# Patient Record
Sex: FEMALE | Race: WHITE | NOT HISPANIC OR LATINO | Employment: OTHER | URBAN - METROPOLITAN AREA
[De-identification: names, ages, dates, MRNs, and addresses within clinical notes are randomized per-mention and may not be internally consistent; named-entity substitution may affect disease eponyms.]

---

## 2017-04-25 ENCOUNTER — OFFICE VISIT (OUTPATIENT)
Dept: OPHTHALMOLOGY | Facility: CLINIC | Age: 82
End: 2017-04-25
Payer: MEDICARE

## 2017-04-25 ENCOUNTER — CLINICAL SUPPORT (OUTPATIENT)
Dept: OPHTHALMOLOGY | Facility: CLINIC | Age: 82
End: 2017-04-25
Attending: OPHTHALMOLOGY
Payer: MEDICARE

## 2017-04-25 DIAGNOSIS — H52.13 MYOPIA WITH ASTIGMATISM AND PRESBYOPIA, BILATERAL: ICD-10-CM

## 2017-04-25 DIAGNOSIS — H52.203 MYOPIA WITH ASTIGMATISM AND PRESBYOPIA, BILATERAL: ICD-10-CM

## 2017-04-25 DIAGNOSIS — M35.01 KCS (KERATOCONJUNCTIVITIS SICCA): ICD-10-CM

## 2017-04-25 DIAGNOSIS — H40.1133 PRIMARY OPEN-ANGLE GLAUCOMA, BILATERAL, SEVERE STAGE: ICD-10-CM

## 2017-04-25 DIAGNOSIS — H52.4 MYOPIA WITH ASTIGMATISM AND PRESBYOPIA, BILATERAL: ICD-10-CM

## 2017-04-25 DIAGNOSIS — Z96.1 PSEUDOPHAKIA: ICD-10-CM

## 2017-04-25 DIAGNOSIS — Z86.69 HISTORY OF RETINAL DETACHMENT: ICD-10-CM

## 2017-04-25 DIAGNOSIS — H02.403 PTOSIS, BILATERAL: ICD-10-CM

## 2017-04-25 DIAGNOSIS — H40.043 STEROID RESPONDERS BORDERLINE GLAUCOMA, BILATERAL: ICD-10-CM

## 2017-04-25 DIAGNOSIS — H40.1133 PRIMARY OPEN-ANGLE GLAUCOMA, BILATERAL, SEVERE STAGE: Primary | ICD-10-CM

## 2017-04-25 DIAGNOSIS — H04.123 DRY EYES, BILATERAL: ICD-10-CM

## 2017-04-25 PROCEDURE — 92134 CPTRZ OPH DX IMG PST SGM RTA: CPT | Mod: S$GLB,,, | Performed by: OPHTHALMOLOGY

## 2017-04-25 PROCEDURE — 99999 PR PBB SHADOW E&M-EST. PATIENT-LVL III: CPT | Mod: PBBFAC,,, | Performed by: OPHTHALMOLOGY

## 2017-04-25 PROCEDURE — 92012 INTRM OPH EXAM EST PATIENT: CPT | Mod: S$GLB,,, | Performed by: OPHTHALMOLOGY

## 2017-04-25 RX ORDER — CYCLOSPORINE 0.5 MG/ML
1 EMULSION OPHTHALMIC 2 TIMES DAILY
Qty: 180 EACH | Refills: 3 | Status: ON HOLD | OUTPATIENT
Start: 2017-04-25 | End: 2023-03-23

## 2017-04-25 RX ORDER — ALPRAZOLAM 0.25 MG/1
1 TABLET ORAL NIGHTLY
COMMUNITY
Start: 2017-03-28 | End: 2021-02-25 | Stop reason: SDUPTHER

## 2017-04-25 RX ORDER — TRAMADOL HYDROCHLORIDE 50 MG/1
50 TABLET ORAL EVERY 6 HOURS PRN
COMMUNITY
End: 2021-02-08

## 2017-04-25 NOTE — PROGRESS NOTES
HPI     Glaucoma    Additional comments: HRT review today           Eye Problem    Additional comments: Pt feels left eye's glaucoma is getting worse           Comments   DLS: 11/8/16    1) POAG  2) Hx RD OD  3) Ptosis/Dermatochalsis  4) Blepharitis  5) GRAYSON  6) PCIOL OU  7) Steroid Responder    MEDS:  Dorzolamide TID OU  Latanoprost QHS OU  Restasis BID OU (RAN OUT)  AT's PRN OU  EES daly QHS OU             Last edited by Nevin Davenport MA on 4/25/2017  1:44 PM. (History)            Assessment /Plan     For exam results, see Encounter Report.    Primary open-angle glaucoma, bilateral, severe stage    Steroid responders borderline glaucoma, bilateral    Ptosis, bilateral    Dry eyes, bilateral    History of retinal detachment - Right Eye    Myopia with astigmatism and presbyopia, bilateral    KCS (keratoconjunctivitis sicca)    Pseudophakia - Both Eyes    1. POAG (primary open-angle glaucoma) - Both Eyes   Followed since at least '93   First HVF 1993  First photos 1999  S/p Trab OU(Tru)  Hx of SLT OS w/ no reponse          Family history    None         Glaucoma meds   Azopt bid, Xalatan qhs         H/O adverse rxn to glaucoma drops    Agan (derm); BB(syncope)        LASERS    SLT OS - 3/25/2005        GLAUCOMA SURGERIES    Trab OU- Dr. Ott - combined od 3/24/1994 // ? Os trab        OTHER EYE SURGERIES    CE OU - Dr. Ott in the 1990's // RD sx. OD 2/19/2012 - [facility]         CDR    0.8/0.9        Tbase    -off gtts 2007- 13-18/20-27  (? Base pre trabs w/ Tru)        Tmax    19/28           Ttarget    18 OU             HVF    13 VF '93 -'16 dense IAD with sup paracentral defect OS: central island remaining, ++ progression OS        Gonio    +3 OU        CCT    [phone]        OCT    2008, 2013 - RNFL - Dec I. Yonathan S OD ??  Dec S/I OS         HRT    6 test 2009 to 2016-MR -  Dec I/N, yonathan SN od // Dec S/N/I os /// CDR 0.81 od // 0.84 os        Disc photos    7/99, 2/02, 10,04 - slides // 2011, 2015  - OIS    -  Ttoday    18/20  - Test done today   HRT/Gonio     2. History of retinal detachment - Right Eye -stable  PPVx - A/Fl Ex - arend - 2/19/2002     3. Ptosis / dermatochalasis  - Both Eyes -stable  S/P ptosis  7/2003 - Dr. Ott   Still C/O droopy left lid      4. Eyelid inflammation - Both Eyes -stable  Blepharitis with marginal ulceration 10/2013  Resolved with blephamide  Cont  - WC/LH - ocusoft lid scrubs /AT's / EES ointment      5. Dry eyes - Both Eyes -stable.   -Cont ATs.   -Tried Restasis in past and doesn't like it     6. Pseudophakia - Both Eyes -stable  Phaco/IOL/trab od 2/19/2002 - Tru  PC IOL os - Tru - ?? Date     7. Steroid responder       Plan:  IOP still  higher than ideal today 18/20  Intol to Agan and BB  Hx of no reponse to SLT OS    - cont latanoprost -ou - [employer] mail order    switch to generic  Dorzolamide 2/2 cost tid ou - sent to [facility]    Rec. baerveldt implant os (previous failed trab)   Pt will think about it - feels she is too old for more surgery   Told pt that surgery will cause the eyelid to droop further - she has had a ptosis repair in past - but feels it is drooping again already - explained that with glaucoma surger it will likely droop even more     Re-instruct on lid hygiene WC / Ocusoft lid scrubs or baby shampoo / AT's - use 4 - 8 x day // EES ointment  Retrial of restasis bid ou    rtc 3-4  months - with HPI     Glaucoma    Additional comments: HRT review today           Eye Problem    Additional comments: Pt feels left eye's glaucoma is getting worse           Comments   DLS: 11/8/16    1) POAG  2) Hx RD OD  3) Ptosis/Dermatochalsis  4) Blepharitis  5) GRAYSON  6) PCIOL OU  7) Steroid Responder    MEDS:  Dorzolamide TID OU  Latanoprost QHS OU  Restasis BID OU (RAN OUT)  AT's PRN OU  EES daly QHS OU             Last edited by Nevin Davenport MA on 4/25/2017  1:44 PM. (History)            Assessment /Plan     For exam results, see Encounter Report.    Primary open-angle glaucoma,  bilateral, severe stage    Steroid responders borderline glaucoma, bilateral    Ptosis, bilateral    Dry eyes, bilateral    History of retinal detachment - Right Eye    Myopia with astigmatism and presbyopia, bilateral    KCS (keratoconjunctivitis sicca)    Pseudophakia - Both Eyes      1. POAG (primary open-angle glaucoma) - Both Eyes   Followed since at least '93   First HVF 1993  First photos 1999  S/p Trab OU(Tru)  Hx of SLT OS w/ no reponse          Family history    None         Glaucoma meds   Azopt bid, Xalatan qhs         H/O adverse rxn to glaucoma drops    Agan (derm); BB(syncope)        LASERS    SLT OS - 3/25/2005        GLAUCOMA SURGERIES    Trab OU- Dr. Ott - combined od 3/24/1994 // ? Os trab        OTHER EYE SURGERIES    CE OU - Dr. Ott in the 1990's // RD sx. OD 2/19/2012 - [facility]         CDR    0.8/0.9        Tbase    -off gtts 2007- 13-18/20-27  (? Base pre trabs w/ Tru)        Tmax    19/28           Ttarget    18 OU             HVF    13 VF '93 -'16 dense IAD with sup paracentral defect OS: central island remaining, ++ progression OS        Gonio    +3 OU        CCT    [phone]        OCT    2008, 2013 - RNFL - Dec IBrijesh Mcmanus S OD ??  Dec S/I OS         HRT    6 test 2009 to 2017-MR -  Dec I/N, bord SN od // Dec S/N/I os /// CDR 0.81 od // 0.84 os        Disc photos    7/99, 2/02, 10,04 - slides // 2011, 2015  - OIS    - Ttoday    18/22  - Test done today   HRT/gonio     2. History of retinal detachment - Right Eye -stable  PPVx - A/Fl Ex - arend - 2/19/2002     3. Ptosis / dermatochalasis  - Both Eyes -stable  S/P ptosis  7/2003 - Dr. Ott   Still C/O droopy left lid      4. Eyelid inflammation - Both Eyes -stable  Blepharitis with marginal ulceration 10/2013  Resolved with blephamide  Cont  - WC/LH - ocusoft lid scrubs /AT's / EES ointment      5. Dry eyes - Both Eyes -stable.   -Cont ATs.   -Tried Restasis in past and doesn't like it     6. Pseudophakia - Both Eyes  -stable  Phaco/IOL/trab od 2/19/2002 - Tru  PC IOL os - Tru - ?? Date     7. Steroid responder       Plan:  IOP higher than ideal today 16/20  Intol to Agan and BB  Hx of no reponse to SLT OS    - cont latanoprost -ou - [employer] mail order   Still using azopt - still has some - when her supply runs out switch to generic  Dorzolamide 2/2 cost    Rec. baerveldt implant os (previous failed trab) or G6 - pt is reluctant   Told pt that surgery will cause the eyelid to droop further - she has had a ptosis repair in past - but feels it is drooping again already - explained that with glaucoma surgery it will likely droop even more     Consider G6 cyclo laser - OS - pt is thinking about this - will discuss with her niece     Re-instruct on lid hygiene WC / Ocusoft lid scrubs or baby shampoo / AT's - use 4 - 8 x day // EES ointment  Retrial of restasis bid ou    This patient  was best friends with another patient of mine - Ms Nayana Aguilar - who was very spry and active - she passed unexpectedly a few days after thanksgiving - found dead on her sofa - when her son went to drop of the grandkids    I have seen and personally examined the patient.  I agree with the findings, assessment and plan of the resident and/or fellow.     Ivette Edward MD

## 2017-04-25 NOTE — Clinical Note
Cyclo G 6 CB destruction OS  Phone -  [phone] If she decides NOT to have the G6 - please have devin or walter make her a F/U in 3 months at [facility]

## 2017-04-26 DIAGNOSIS — H40.1133 PRIMARY OPEN ANGLE GLAUCOMA OF BOTH EYES, SEVERE STAGE: ICD-10-CM

## 2017-04-26 RX ORDER — LATANOPROST 50 UG/ML
SOLUTION/ DROPS OPHTHALMIC
Qty: 3 BOTTLE | Refills: 3 | Status: SHIPPED | OUTPATIENT
Start: 2017-04-26 | End: 2023-04-19 | Stop reason: SDUPTHER

## 2017-05-01 ENCOUNTER — TELEPHONE (OUTPATIENT)
Dept: OPHTHALMOLOGY | Facility: CLINIC | Age: 82
End: 2017-05-01

## 2017-05-02 ENCOUNTER — TELEPHONE (OUTPATIENT)
Dept: OPHTHALMOLOGY | Facility: CLINIC | Age: 82
End: 2017-05-02

## 2017-05-02 NOTE — TELEPHONE ENCOUNTER
Called pt to schedule her next appointment in July with Dr. Edward left a message on her answering machine.

## 2017-05-04 ENCOUNTER — TELEPHONE (OUTPATIENT)
Dept: OPHTHALMOLOGY | Facility: CLINIC | Age: 82
End: 2017-05-04

## 2017-07-25 ENCOUNTER — OFFICE VISIT (OUTPATIENT)
Dept: OPHTHALMOLOGY | Facility: CLINIC | Age: 82
End: 2017-07-25
Payer: MEDICARE

## 2017-07-25 DIAGNOSIS — M35.01 KCS (KERATOCONJUNCTIVITIS SICCA): ICD-10-CM

## 2017-07-25 DIAGNOSIS — H02.403 PTOSIS, BILATERAL: ICD-10-CM

## 2017-07-25 DIAGNOSIS — Z86.69 HISTORY OF RETINAL DETACHMENT: ICD-10-CM

## 2017-07-25 DIAGNOSIS — H04.123 DRY EYES, BILATERAL: ICD-10-CM

## 2017-07-25 DIAGNOSIS — Z96.1 PSEUDOPHAKIA: ICD-10-CM

## 2017-07-25 DIAGNOSIS — H40.1133 PRIMARY OPEN-ANGLE GLAUCOMA, BILATERAL, SEVERE STAGE: Primary | ICD-10-CM

## 2017-07-25 PROCEDURE — 92012 INTRM OPH EXAM EST PATIENT: CPT | Mod: S$GLB,,, | Performed by: OPHTHALMOLOGY

## 2017-07-25 PROCEDURE — 99999 PR PBB SHADOW E&M-EST. PATIENT-LVL I: CPT | Mod: PBBFAC,,, | Performed by: OPHTHALMOLOGY

## 2017-07-25 NOTE — PROGRESS NOTES
HPI     DLS: 4/25/17    Pt here for IOP check;    Meds: Dorzolamide tid ou             Latanoprost qhs ou             AT's prn ou    1. Primary open angle glaucoma, bilateral, severe stage  2. Steroid responders borderline glaucoma, bilateral  3. Ptosis, bilateral  4. Dry eyes, bilateral   5. History of retinal detachment, right eye  6. Myopia with astigmatism and presbyopia, bilateral  7. KCS (keratocjuncitivits sicca)  8. Pseudophakia, both eyes     Last edited by Richa Arroyo on 7/25/2017  3:33 PM. (History)            Assessment /Plan     For exam results, see Encounter Report.    Primary open-angle glaucoma, bilateral, severe stage    Ptosis, bilateral    Dry eyes, bilateral    KCS (keratoconjunctivitis sicca)    History of retinal detachment - Right Eye    Pseudophakia - Both Eyes          1. POAG (primary open-angle glaucoma) - Both Eyes   Followed since at least '93   First HVF 1993  First photos 1999  S/p Trab OU(Tru)  Hx of SLT OS w/ no reponse          Family history    None         Glaucoma meds   Azopt bid, Xalatan qhs         H/O adverse rxn to glaucoma drops    Agan (derm); BB(syncope)        LASERS    SLT OS - 3/25/2005        GLAUCOMA SURGERIES    Trab OU- Dr. Ott - combined od 3/24/1994 // ? Os trab        OTHER EYE SURGERIES    CE OU - Dr. Ott in the 1990's // RD sx. OD 2/19/2012 - [facility]         CDR    0.8/0.9        Tbase    -off gtts 2007- 13-18/20-27  (? Base pre trabs w/ Tru)        Tmax    19/28           Ttarget    18 OU             HVF    13 VF '93 -'16 dense IAD with sup paracentral defect OS: central island remaining, ++ progression OS        Gonio    +3 OU        CCT    [phone]        OCT    2008, 2013 - RNFL - Blayne GARCIA OD ??  Dec S/I OS         HRT    6 test 2009 to 2017-MR -  Dec I/N, oneyda SN od // Dec S/N/I os /// CDR 0.81 od // 0.84 os        Disc photos    7/99, 2/02, 10,04 - slides // 2011, 2015  - OIS    - Ttoday    15/16   - Test done today  IOP check with change in  gtts    2. History of retinal detachment - Right Eye -stable  PPVx - A/Fl Ex - arend - 2/19/2002     3. Ptosis / dermatochalasis  - Both Eyes -stable  S/P ptosis  7/2003 - Dr. Ott   Still C/O droopy left lid      4. Eyelid inflammation - Both Eyes -stable  Blepharitis with marginal ulceration 10/2013  Resolved with blephamide  Cont  - WC/LH - ocusoft lid scrubs /AT's / EES ointment      5. Dry eyes - Both Eyes -stable.   -Cont ATs.   -Tried Restasis in past and doesn't like it     6. Pseudophakia - Both Eyes -stable  Phaco/IOL/trab od 2/19/2002 - Tru  PC IOL os - Tru - ?? Date     7. Steroid responder  - gets for her back pain from time to time        Plan:  IOP ok today - below target of 18 ou   Good resp to dorzolamide tid ou (had to switch from azopt 2/2 cost) (( pt does note that the dorzolamide burns more))   Cont latanoprost ou   Intol to Agan and BB  Hx of no reponse to SLT OS      Consider baerveldt implant os (previous failed trab) or G6 - pt is reluctant   Told pt that surgery will cause the eyelid to droop further - she has had a ptosis repair in past - but feels it is drooping again already - explained that with glaucoma surgery it will likely droop even more     Consider G6 cyclo laser - OS - pt is thinking about this - will discuss with her niece     Re-instruct on lid hygiene WC / Ocusoft lid scrubs or baby shampoo / AT's - use 4 - 8 x day // EES ointment  Retrial of restasis bid ou    This patient  was best friends with another patient of mine - Ms Nayana Aguilar - who was very spry and active - she passed unexpectedly a few days after thanksgiving - found dead on her sofa - when her son went to drop of the grandkids    I have seen and personally examined the patient.  I agree with the findings, assessment and plan of the resident and/or fellow.     Ivette Edward MD

## 2017-09-20 ENCOUNTER — OFFICE VISIT (OUTPATIENT)
Dept: URGENT CARE | Facility: CLINIC | Age: 82
End: 2017-09-20
Payer: MEDICARE

## 2017-09-20 VITALS
WEIGHT: 125 LBS | OXYGEN SATURATION: 99 % | RESPIRATION RATE: 16 BRPM | HEIGHT: 61 IN | HEART RATE: 83 BPM | BODY MASS INDEX: 23.6 KG/M2 | TEMPERATURE: 98 F | SYSTOLIC BLOOD PRESSURE: 146 MMHG | DIASTOLIC BLOOD PRESSURE: 74 MMHG

## 2017-09-20 DIAGNOSIS — K29.70 GASTRITIS WITHOUT BLEEDING, UNSPECIFIED CHRONICITY, UNSPECIFIED GASTRITIS TYPE: Primary | ICD-10-CM

## 2017-09-20 PROCEDURE — 1159F MED LIST DOCD IN RCRD: CPT | Mod: S$GLB,,, | Performed by: INTERNAL MEDICINE

## 2017-09-20 PROCEDURE — 99213 OFFICE O/P EST LOW 20 MIN: CPT | Mod: S$GLB,,, | Performed by: INTERNAL MEDICINE

## 2017-09-20 PROCEDURE — 3008F BODY MASS INDEX DOCD: CPT | Mod: S$GLB,,, | Performed by: INTERNAL MEDICINE

## 2017-09-20 RX ORDER — HYDROCHLOROTHIAZIDE 12.5 MG/1
12.5 TABLET ORAL DAILY
COMMUNITY
End: 2021-02-08

## 2017-09-20 RX ORDER — OXYCODONE AND ACETAMINOPHEN 5; 325 MG/1; MG/1
1 TABLET ORAL EVERY 4 HOURS PRN
COMMUNITY
End: 2021-02-08

## 2017-09-20 RX ORDER — FEXOFENADINE HCL 60 MG
60 TABLET ORAL DAILY PRN
COMMUNITY

## 2017-09-20 RX ORDER — ASPIRIN 81 MG/1
81 TABLET ORAL DAILY
COMMUNITY
End: 2017-10-30

## 2017-09-20 NOTE — PROGRESS NOTES
"Subjective:       Patient ID: Cat Bolivar is a 89 y.o. female.    Vitals:  height is 5' 1" (1.549 m) and weight is 56.7 kg (125 lb). Her temperature is 97.7 °F (36.5 °C). Her blood pressure is 146/74 (abnormal) and her pulse is 83. Her respiration is 16 and oxygen saturation is 99%.     Chief Complaint: Abdominal Cramping (Started yesterday night )    Abdominal Cramping   This is a new problem. The current episode started yesterday. The onset quality is gradual. The problem occurs intermittently. The problem has been gradually worsening. Associated symptoms include constipation, a fever and nausea. Pertinent negatives include no diarrhea, dysuria, hematochezia, melena or vomiting. The pain is relieved by nothing. The treatment provided no relief.     Review of Systems   Constitution: Positive for chills and fever.   Cardiovascular: Negative for chest pain.   Respiratory: Negative for shortness of breath.    Musculoskeletal: Negative for back pain.   Gastrointestinal: Positive for abdominal pain, constipation and nausea. Negative for diarrhea, hematochezia, melena and vomiting.   Genitourinary: Negative for dysuria.       Objective:      Physical Exam   Constitutional: She appears well-developed and well-nourished.   HENT:   Head: Normocephalic and atraumatic.   Neck: Normal range of motion. Neck supple.   Cardiovascular: Normal rate and regular rhythm.    Pulmonary/Chest: Effort normal and breath sounds normal.   Abdominal: Bowel sounds are normal.   Mid epi discomfort to palpation   Skin: Skin is warm and dry.       Assessment:       1. Gastritis without bleeding, unspecified chronicity, unspecified gastritis type        Plan:         Gastritis without bleeding, unspecified chronicity, unspecified gastritis type          "

## 2017-10-30 ENCOUNTER — OFFICE VISIT (OUTPATIENT)
Dept: URGENT CARE | Facility: CLINIC | Age: 82
End: 2017-10-30
Payer: MEDICARE

## 2017-10-30 VITALS
HEIGHT: 61 IN | SYSTOLIC BLOOD PRESSURE: 160 MMHG | RESPIRATION RATE: 18 BRPM | HEART RATE: 72 BPM | BODY MASS INDEX: 23.6 KG/M2 | WEIGHT: 125 LBS | DIASTOLIC BLOOD PRESSURE: 72 MMHG | TEMPERATURE: 98 F | OXYGEN SATURATION: 100 %

## 2017-10-30 DIAGNOSIS — L03.114 CELLULITIS OF LEFT UPPER EXTREMITY: ICD-10-CM
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
## 2017-10-30 PROCEDURE — 99214 OFFICE O/P EST MOD 30 MIN: CPT | Mod: S$GLB,,, | Performed by: EMERGENCY MEDICINE

## 2017-10-30 RX ORDER — SIMVASTATIN 10 MG/1
TABLET, FILM COATED ORAL
COMMUNITY
Start: 2017-09-08 | End: 2021-02-08

## 2017-10-30 RX ORDER — CLINDAMYCIN HYDROCHLORIDE 300 MG/1
300 CAPSULE ORAL EVERY 8 HOURS
Qty: 21 CAPSULE | Refills: 0 | Status: SHIPPED | OUTPATIENT
Start: 2017-10-30 | End: 2017-11-06

## 2017-10-30 RX ORDER — MUPIROCIN 20 MG/G
OINTMENT TOPICAL
Qty: 22 G | Refills: 1 | Status: SHIPPED | OUTPATIENT
Start: 2017-10-30 | End: 2021-02-08

## 2017-10-30 NOTE — PROGRESS NOTES
"Subjective:       Patient ID: Cat Bolivar is a 89 y.o. female.    Vitals:  height is 5' 1" (1.549 m) and weight is 56.7 kg (125 lb). Her temperature is 98.3 °F (36.8 °C). Her blood pressure is 160/72 (abnormal) and her pulse is 72. Her respiration is 18 and oxygen saturation is 100%.     Chief Complaint: Insect Bite    Patient states she noticed bump on her left shoulder. Patient doesn't know if its a bite or a burn. SHE HAS NOT HAD ANY TRAUMA OR BURN. SHE DID USE HEATING PAD BUT HAD 2 LAYERS OF CLOTHING BETWEEN PAD AND SKIN. SHE NOTICED REDNESS AND BITE ON Friday AND NOW REPORTS SCAB FORMED AT THE SITRE AND SERROUNDING BURNING SENSATION AND REDNESS, SHE ALSO REPORTS ITCHING SENSATION. NO FEVERS, NO CHILLS, DEFINITE ITCHING. NO ABDOMINAL PAIN, NO MUSCLE CRAMPS, NO FEVERS, NO JOINT PAINS, NO VESICLES, NO BLISTERING RASH. NO DRAINAGE OF PUS FROM THE WOUND.      Insect Bite   This is a new problem. The current episode started in the past 7 days. The problem has been gradually worsening. Pertinent negatives include no abdominal pain, chest pain, chills, fever, nausea, rash, sore throat or vomiting. Exacerbated by: touching  Treatments tried: antibiotic cream. The treatment provided no relief.     Review of Systems   Constitution: Negative for chills and fever.   HENT: Negative for sore throat.    Cardiovascular: Negative for chest pain and dyspnea on exertion.   Respiratory: Negative for shortness of breath.    Skin: Positive for color change and itching. Negative for rash.   Musculoskeletal: Positive for joint pain.   Gastrointestinal: Negative for abdominal pain, diarrhea, melena, nausea and vomiting.       Objective:      Physical Exam   Constitutional: She is oriented to person, place, and time. She appears well-developed and well-nourished. No distress.   HENT:   Head: Normocephalic and atraumatic.   Right Ear: External ear normal.   Left Ear: External ear normal.   Mouth/Throat: No oropharyngeal exudate. "   Eyes: Conjunctivae and EOM are normal. Pupils are equal, round, and reactive to light.   Neck: Normal range of motion. Neck supple.   Cardiovascular: Normal rate, regular rhythm and normal heart sounds.  Exam reveals no gallop and no friction rub.    No murmur heard.  Pulmonary/Chest: Effort normal and breath sounds normal. She has no wheezes.   Abdominal: Soft. Bowel sounds are normal. There is no tenderness.   Musculoskeletal: Normal range of motion. She exhibits tenderness. She exhibits no edema or deformity.   Neurological: She is alert and oriented to person, place, and time. She has normal reflexes. She displays normal reflexes. No cranial nerve deficit. Coordination normal.   Skin: Skin is warm and dry. Capillary refill takes less than 2 seconds. No rash noted. She is not diaphoretic. There is erythema. No pallor.   AFFECTED AREA AS BELOW:  LEFT SHOULDER SCAB 1 CM IN SIZE WITH 2 CM ERYTHEMATOUS WOUND EDGES SURROUNDING SCAB. AT WOUND. PALER AND WARMER ERYTHEMA/TTP/SUPERFICIAL MILD CELLULITIS OF THE LEFT SHOULDER AREA WHERE SHE REPORTS PAIN AND BURNING AND TEDNERNESS. ROM IS NORMAL AND NO BONY TTP. DISTALLY NV INTACT.   Psychiatric: She has a normal mood and affect. Her behavior is normal.   Nursing note and vitals reviewed.      Assessment:       1. Bug bite with infection, initial encounter    2. Cellulitis of left upper extremity        Plan:         Bug bite with infection, initial encounter    Cellulitis of left upper extremity    Other orders  -     mupirocin (BACTROBAN) 2 % ointment; Apply to affected area 3 times daily  Dispense: 22 g; Refill: 1  -     clindamycin (CLEOCIN) 300 MG capsule; Take 1 capsule (300 mg total) by mouth every 8 (eight) hours.  Dispense: 21 capsule; Refill: 0          Patient Instructions     KEEP CLEAN AND COVERED AND WASH WITH ANTIBACTERIAL SOAP AND WATER LIKE DIAL    CLINDAMYCIN RX  BACTROBAN OINTMENT RX  OK TO TAKE TYLENOL 650 MG EVERY 6 HOURS OR TAKE YOUR PAIN MEDICINES AS  PREVIOUSLY PRESCRIBED  TAKE ALLEGRA DAILY    SEE INFECTED INSECT BITE SHEET  SEE CELLULITIS SHEET    RETURN FOR ANY CONCERNS OR PROBLEMS  GO TO THE ER IF WORSE IN ANY WAY  Infected Insect Bite or Sting    When an insect stings you, it injects venom. When an insect bites you, it does not. Stings and bites may cause a local reaction. Or they may cause a reaction that affects your whole body. Bites and stings may become infected. Signs of infection include redness, warmth, pain, drainage of pus, and swelling. Infections will need treatment with antibiotics and should get better over the next 10 days.  Home care  The following will help you care for your bite or sting at home:  · If a stinger is still in your skin, it will need to be removed. Don't use tweezers. Gently scrape the stinger from the side with a firm object such as the side of a credit card. This will loosen it and remove it from your skin.  · If itching is a problem, applying ice packs to the sting area will help.  · Wash the area with soap and water at least 3 times a day. Apply a topical antibiotic cream or ointment.  · You can use an over-the counter antihistamine unless your doctor has given you a prescription antihistamine. You may use antihistamines to reduce itching if large areas of the skin are involved. Use lower doses during the daytime and higher doses at bedtime since the drug may make you sleepy. Don't use an antihistamine if you have glaucoma or if you are a man with trouble urinating due to an enlarged prostate. Some antihistamines cause less drowsiness and are a good choice for daytime use.  · If oral antibiotics have been prescribed, be sure to take them as directed until they are all finished.  · You may use over-the-counter pain medicine to control pain, unless another pain medicine was prescribed. Talk with your doctor before using acetaminophen or ibuprofen if you have chronic liver or kidney disease. Also talk with your doctor if you  have ever had a stomach ulcer or gastrointestinal bleeding.  Follow-up care  Follow up with your healthcare provider, or as advised if you don't get better over the next 2 days or if your symptoms get worse.  Call 911  Call 911 if any of these occur:  · Swelling of the face, eyelids, mouth, throat, or tongue  · Difficulty swallowing or breathing  When to seek medical advice  Call your healthcare provider right away if any of these occur:  · Spreading areas of redness or swelling  · Fever of 100.4°F (38°C) or higher, or as directed by your healthcare provider  · Increased local pain  · Headache, fever, chills, muscle or joint aching, or vomiting,  · New rash  Date Last Reviewed: 10/1/2016  © 2486-6126 [employer]. [address] [place] [address], [place] [address]. All rights reserved. This information is not intended as a substitute for professional medical care. Always follow your healthcare professional's instructions.        Discharge Instructions for Cellulitis  You have been diagnosed with cellulitis. This is an infection in the deepest layer of the skin. In some cases, the infection also affects the muscle. Cellulitis is caused by bacteria. The bacteria can enter the body through broken skin. This can happen with a cut, scratch, animal bite, or an insect bite that has been scratched. You may have been treated in the hospital with antibiotics and fluids. You will likely be given a prescription for antibiotics to take at home. This sheet will help you take care of yourself at home.  Home care  When you are home:  · Take the prescribed antibiotic medicine you are given as directed until it is gone. Take it even if you feel better. It treats the infection and stops it from returning. Not taking all the medicine can make future infections hard to treat.  · Keep the infected area clean.  · When possible, raise the infected area above the level of your heart. This helps keep swelling down.  · Talk with your  healthcare provider if you are in pain. Ask what kind of over-the-counter medicine you can take for pain.  · Apply clean bandages as advised.  · Take your temperature once a day for a week.  · Wash your hands often to prevent spreading the infection.  In the future, wash your hands before and after you touch cuts, scratches, or bandages. This will help prevent infection.   When to call your healthcare provider  Call your healthcare provider immediately if you have any of the following:  · Difficulty or pain when moving the joints above or below the infected area  · Discharge or pus draining from the area  · Fever of 100.4°F (38°C) or higher, or as directed by your healthcare provider  · Pain that gets worse in or around the infected   · Redness that gets worse in or around the infected area, particularly if the area of redness expands to a wider area  · Shaking chills  · Swelling of the infected area  · Vomiting   Date Last Reviewed: 8/1/2016  © 7053-5844 [employer]. [address] [place] [address], [place] [address]. All rights reserved. This information is not intended as a substitute for professional medical care. Always follow your healthcare professional's instructions.

## 2017-10-30 NOTE — PATIENT INSTRUCTIONS
KEEP CLEAN AND COVERED AND WASH WITH ANTIBACTERIAL SOAP AND WATER LIKE DIAL    CLINDAMYCIN RX  [facility]  OK TO TAKE TYLENOL 650 MG EVERY 6 HOURS OR TAKE YOUR PAIN MEDICINES AS PREVIOUSLY PRESCRIBED  TAKE ALLEGRA DAILY    SEE INFECTED INSECT BITE SHEET  SEE CELLULITIS SHEET    RETURN FOR ANY CONCERNS OR PROBLEMS  GO TO THE ER IF WORSE IN ANY WAY  Infected Insect Bite or Sting    When an insect stings you, it injects venom. When an insect bites you, it does not. Stings and bites may cause a local reaction. Or they may cause a reaction that affects your whole body. Bites and stings may become infected. Signs of infection include redness, warmth, pain, drainage of pus, and swelling. Infections will need treatment with antibiotics and should get better over the next 10 days.  Home care  The following will help you care for your bite or sting at home:  · If a stinger is still in your skin, it will need to be removed. Don't use tweezers. Gently scrape the stinger from the side with a firm object such as the side of a credit card. This will loosen it and remove it from your skin.  · If itching is a problem, applying ice packs to the sting area will help.  · Wash the area with soap and water at least 3 times a day. Apply a topical antibiotic cream or ointment.  · You can use an over-the counter antihistamine unless your doctor has given you a prescription antihistamine. You may use antihistamines to reduce itching if large areas of the skin are involved. Use lower doses during the daytime and higher doses at bedtime since the drug may make you sleepy. Don't use an antihistamine if you have glaucoma or if you are a man with trouble urinating due to an enlarged prostate. Some antihistamines cause less drowsiness and are a good choice for daytime use.  · If oral antibiotics have been prescribed, be sure to take them as directed until they are all finished.  · You may use over-the-counter pain medicine to control  pain, unless another pain medicine was prescribed. Talk with your doctor before using acetaminophen or ibuprofen if you have chronic liver or kidney disease. Also talk with your doctor if you have ever had a stomach ulcer or gastrointestinal bleeding.  Follow-up care  Follow up with your healthcare provider, or as advised if you don't get better over the next 2 days or if your symptoms get worse.  Call 911  Call 911 if any of these occur:  · Swelling of the face, eyelids, mouth, throat, or tongue  · Difficulty swallowing or breathing  When to seek medical advice  Call your healthcare provider right away if any of these occur:  · Spreading areas of redness or swelling  · Fever of 100.4°F (38°C) or higher, or as directed by your healthcare provider  · Increased local pain  · Headache, fever, chills, muscle or joint aching, or vomiting,  · New rash  Date Last Reviewed: 10/1/2016  © 3546-1184 [employer]. [address] [place] [address]. All rights reserved. This information is not intended as a substitute for professional medical care. Always follow your healthcare professional's instructions.        Discharge Instructions for Cellulitis  You have been diagnosed with cellulitis. This is an infection in the deepest layer of the skin. In some cases, the infection also affects the muscle. Cellulitis is caused by bacteria. The bacteria can enter the body through broken skin. This can happen with a cut, scratch, animal bite, or an insect bite that has been scratched. You may have been treated in the hospital with antibiotics and fluids. You will likely be given a prescription for antibiotics to take at home. This sheet will help you take care of yourself at home.  Home care  When you are home:  · Take the prescribed antibiotic medicine you are given as directed until it is gone. Take it even if you feel better. It treats the infection and stops it from returning. Not taking all the medicine can make  future infections hard to treat.  · Keep the infected area clean.  · When possible, raise the infected area above the level of your heart. This helps keep swelling down.  · Talk with your healthcare provider if you are in pain. Ask what kind of over-the-counter medicine you can take for pain.  · Apply clean bandages as advised.  · Take your temperature once a day for a week.  · Wash your hands often to prevent spreading the infection.  In the future, wash your hands before and after you touch cuts, scratches, or bandages. This will help prevent infection.   When to call your healthcare provider  Call your healthcare provider immediately if you have any of the following:  · Difficulty or pain when moving the joints above or below the infected area  · Discharge or pus draining from the area  · Fever of 100.4°F (38°C) or higher, or as directed by your healthcare provider  · Pain that gets worse in or around the infected   · Redness that gets worse in or around the infected area, particularly if the area of redness expands to a wider area  · Shaking chills  · Swelling of the infected area  · Vomiting   Date Last Reviewed: 8/1/2016  © 8829-6963 [employer]. [address] [place] [address]. All rights reserved. This information is not intended as a substitute for professional medical care. Always follow your healthcare professional's instructions.

## 2018-07-16 ENCOUNTER — TELEPHONE (OUTPATIENT)
Dept: ORTHOPEDICS | Facility: CLINIC | Age: 83
End: 2018-07-16

## 2018-07-16 ENCOUNTER — TELEPHONE (OUTPATIENT)
Dept: PAIN MEDICINE | Facility: CLINIC | Age: 83
End: 2018-07-16

## 2018-07-16 NOTE — TELEPHONE ENCOUNTER
Spoke with pt , pt states she wanted an appointment to see a doctor for her back. Informed her we only specialize in hands here at the hand clinic and gave her the phone number [phone] to back and spine to make an appt. Pt verbalized understanding.

## 2018-07-16 NOTE — TELEPHONE ENCOUNTER
----- Message from Mariella Woodson sent at 7/16/2018  4:31 PM CDT -----  Contact: pt            Name of Who is Calling: pt      What is the request in detail: pt wants to know information coolief. Please call pt      Can the clinic reply by [facility]: no      What [facility] [phone]

## 2018-07-16 NOTE — TELEPHONE ENCOUNTER
----- Message from Katherine Valiente sent at 7/16/2018  4:03 PM CDT -----  Contact: Pt  Name of Who is Calling:CINDI SHAIKH [[id number]]    What is the request in detail: Patient request more information on Coolief procedure  for the lower back Please contact to further discuss and advise    an the clinic reply by [facility]: No    What [facility]: [phone]

## 2018-07-16 NOTE — TELEPHONE ENCOUNTER
----- Message from Christiana Mcknight sent at 7/16/2018  3:20 PM CDT -----  Contact: pt            Name of Who is Calling: Cat      What is the request in detail: requesting a call back in regards to a procedure that she says she read about on a pamphlet,she cant remember what she did with it to get the name of the procedure,she states it starts with (Jordi). She wants to know does any orthopedic doctor performs that procedure and will a pacemaker interfere with it. Please call pt for more details.      Can the clinic reply by [facility]: no      What Number to Call Back if not in [facility]: [phone]

## 2018-07-16 NOTE — TELEPHONE ENCOUNTER
Contacted and spoke to Ms. Bolivar, her questions regarding the COOLIED RFA .     She was provided the contact information to the [facility].

## 2018-10-25 ENCOUNTER — OFFICE VISIT (OUTPATIENT)
Dept: URGENT CARE | Facility: CLINIC | Age: 83
End: 2018-10-25
Payer: MEDICARE

## 2018-10-25 VITALS
TEMPERATURE: 98 F | BODY MASS INDEX: 23.6 KG/M2 | HEIGHT: 61 IN | WEIGHT: 125 LBS | HEART RATE: 83 BPM | DIASTOLIC BLOOD PRESSURE: 77 MMHG | OXYGEN SATURATION: 95 % | SYSTOLIC BLOOD PRESSURE: 190 MMHG

## 2018-10-25 DIAGNOSIS — J30.1 SEASONAL ALLERGIC RHINITIS DUE TO POLLEN: Primary | ICD-10-CM

## 2018-10-25 DIAGNOSIS — J02.9 SORE THROAT: ICD-10-CM

## 2018-10-25 PROCEDURE — 96372 THER/PROPH/DIAG INJ SC/IM: CPT | Mod: S$GLB,,, | Performed by: INTERNAL MEDICINE

## 2018-10-25 PROCEDURE — 99214 OFFICE O/P EST MOD 30 MIN: CPT | Mod: 25,S$GLB,, | Performed by: INTERNAL MEDICINE

## 2018-10-25 RX ORDER — PREDNISOLONE SODIUM PHOSPHATE 15 MG/5ML
15 SOLUTION ORAL
Status: COMPLETED | OUTPATIENT
Start: 2018-10-25 | End: 2018-10-25

## 2018-10-25 RX ADMIN — PREDNISOLONE SODIUM PHOSPHATE 15 MG: 15 SOLUTION ORAL at 04:10

## 2018-10-25 NOTE — PROGRESS NOTES
"Subjective:       Patient ID: Cat Bolivar is a 89 y.o. female.    Vitals:  height is 5' 1" (1.549 m) and weight is 56.7 kg (125 lb). Her oral temperature is 97.5 °F (36.4 °C). Her blood pressure is 190/77 (abnormal) and her pulse is 83. Her oxygen saturation is 95%.     Chief Complaint: URI    URI    This is a new problem. The current episode started 1 to 4 weeks ago (1.5 wks ago). The problem has been unchanged. Associated symptoms include congestion, coughing, headaches, sinus pain, sneezing and a sore throat. Pertinent negatives include no abdominal pain, chest pain, ear pain, nausea, plugged ear sensation or wheezing. She has tried NSAIDs and decongestant for the symptoms. The treatment provided mild relief.     Review of Systems   Constitution: Positive for chills, fever and malaise/fatigue.   HENT: Positive for congestion, hoarse voice, sinus pain, sneezing and sore throat. Negative for ear pain.    Eyes: Negative for discharge and redness.   Cardiovascular: Negative for chest pain, dyspnea on exertion and leg swelling.   Respiratory: Positive for cough. Negative for shortness of breath, sputum production and wheezing.    Musculoskeletal: Negative for myalgias.   Gastrointestinal: Negative for abdominal pain and nausea.   Neurological: Positive for headaches.       Objective:      Physical Exam   Constitutional: She appears well-developed and well-nourished.   HENT:   Head: Normocephalic and atraumatic.   Nose: Mucosal edema and rhinorrhea present.   Mouth/Throat: Posterior oropharyngeal edema and posterior oropharyngeal erythema present.   Eyes: Conjunctivae and EOM are normal. Pupils are equal, round, and reactive to light.   Neck: Normal range of motion. Neck supple.   Cardiovascular: Normal rate and regular rhythm.   Pulmonary/Chest: Effort normal and breath sounds normal.   Nursing note and vitals reviewed.      Assessment:       1. Seasonal allergic rhinitis due to pollen    2. Sore throat      "   Plan:         Seasonal allergic rhinitis due to pollen  -     prednisoLONE 15 mg/5 mL (3 mg/mL) solution 15 mg    Sore throat

## 2019-08-13 ENCOUNTER — TELEPHONE (OUTPATIENT)
Dept: FAMILY MEDICINE | Facility: CLINIC | Age: 84
End: 2019-08-13

## 2019-08-13 NOTE — TELEPHONE ENCOUNTER
----- Message from Henry Caputo MA sent at 8/13/2019 11:06 AM CDT -----  Contact: Mignon at [facility]  Mignon at [facility] would like to be called back regarding this patient and some others..      Mignon at [facility] can be reached at [phone].      Thanks

## 2021-01-08 ENCOUNTER — OFFICE VISIT (OUTPATIENT)
Dept: PRIMARY CARE CLINIC | Facility: CLINIC | Age: 86
End: 2021-01-08
Payer: MEDICARE

## 2021-01-08 VITALS
BODY MASS INDEX: 22.56 KG/M2 | WEIGHT: 119.5 LBS | HEIGHT: 61 IN | SYSTOLIC BLOOD PRESSURE: 132 MMHG | OXYGEN SATURATION: 97 % | DIASTOLIC BLOOD PRESSURE: 72 MMHG | HEART RATE: 69 BPM

## 2021-01-08 DIAGNOSIS — I25.10 CORONARY ARTERY DISEASE INVOLVING NATIVE CORONARY ARTERY OF NATIVE HEART WITHOUT ANGINA PECTORIS: Primary | ICD-10-CM

## 2021-01-08 DIAGNOSIS — F41.9 ANXIETY: ICD-10-CM

## 2021-01-08 DIAGNOSIS — E03.9 HYPOTHYROIDISM (ACQUIRED): ICD-10-CM

## 2021-01-08 DIAGNOSIS — M47.812 CERVICAL SPONDYLOSIS WITHOUT MYELOPATHY: ICD-10-CM

## 2021-01-08 DIAGNOSIS — G89.29 CHRONIC NECK PAIN: ICD-10-CM

## 2021-01-08 DIAGNOSIS — M54.2 CHRONIC NECK PAIN: ICD-10-CM

## 2021-01-08 DIAGNOSIS — Z95.0 PACEMAKER: ICD-10-CM

## 2021-01-08 DIAGNOSIS — E78.2 HYPERLIPIDEMIA, MIXED: ICD-10-CM

## 2021-01-08 DIAGNOSIS — I11.9 HYPERTENSIVE HEART DISEASE WITHOUT HEART FAILURE: ICD-10-CM

## 2021-01-08 PROCEDURE — 1159F MED LIST DOCD IN RCRD: CPT | Mod: S$GLB,,, | Performed by: INTERNAL MEDICINE

## 2021-01-08 PROCEDURE — 3288F FALL RISK ASSESSMENT DOCD: CPT | Mod: CPTII,S$GLB,, | Performed by: INTERNAL MEDICINE

## 2021-01-08 PROCEDURE — 1126F PR PAIN SEVERITY QUANTIFIED, NO PAIN PRESENT: ICD-10-PCS | Mod: S$GLB,,, | Performed by: INTERNAL MEDICINE

## 2021-01-08 PROCEDURE — 99999 PR PBB SHADOW E&M-EST. PATIENT-LVL IV: ICD-10-PCS | Mod: PBBFAC,,, | Performed by: INTERNAL MEDICINE

## 2021-01-08 PROCEDURE — 99214 PR OFFICE/OUTPT VISIT, EST, LEVL IV, 30-39 MIN: ICD-10-PCS | Mod: S$GLB,,, | Performed by: INTERNAL MEDICINE

## 2021-01-08 PROCEDURE — 3288F PR FALLS RISK ASSESSMENT DOCUMENTED: ICD-10-PCS | Mod: CPTII,S$GLB,, | Performed by: INTERNAL MEDICINE

## 2021-01-08 PROCEDURE — 1126F AMNT PAIN NOTED NONE PRSNT: CPT | Mod: S$GLB,,, | Performed by: INTERNAL MEDICINE

## 2021-01-08 PROCEDURE — 99214 OFFICE O/P EST MOD 30 MIN: CPT | Mod: S$GLB,,, | Performed by: INTERNAL MEDICINE

## 2021-01-08 PROCEDURE — 99999 PR PBB SHADOW E&M-EST. PATIENT-LVL IV: CPT | Mod: PBBFAC,,, | Performed by: INTERNAL MEDICINE

## 2021-01-08 PROCEDURE — 1159F PR MEDICATION LIST DOCUMENTED IN MEDICAL RECORD: ICD-10-PCS | Mod: S$GLB,,, | Performed by: INTERNAL MEDICINE

## 2021-01-08 PROCEDURE — 1101F PT FALLS ASSESS-DOCD LE1/YR: CPT | Mod: CPTII,S$GLB,, | Performed by: INTERNAL MEDICINE

## 2021-01-08 PROCEDURE — 1101F PR PT FALLS ASSESS DOC 0-1 FALLS W/OUT INJ PAST YR: ICD-10-PCS | Mod: CPTII,S$GLB,, | Performed by: INTERNAL MEDICINE

## 2021-01-11 ENCOUNTER — TELEPHONE (OUTPATIENT)
Dept: PRIMARY CARE CLINIC | Facility: CLINIC | Age: 86
End: 2021-01-11

## 2021-01-20 ENCOUNTER — TELEPHONE (OUTPATIENT)
Dept: FAMILY MEDICINE | Facility: CLINIC | Age: 86
End: 2021-01-20

## 2021-01-20 RX ORDER — SERTRALINE HYDROCHLORIDE 25 MG/1
25 TABLET, FILM COATED ORAL DAILY
Qty: 30 TABLET | Refills: 11 | Status: SHIPPED | OUTPATIENT
Start: 2021-01-20 | End: 2021-02-08

## 2021-02-02 ENCOUNTER — TELEPHONE (OUTPATIENT)
Dept: CARDIOLOGY | Facility: CLINIC | Age: 86
End: 2021-02-02

## 2021-02-05 ENCOUNTER — DOCUMENTATION ONLY (OUTPATIENT)
Dept: CARDIOLOGY | Facility: HOSPITAL | Age: 86
End: 2021-02-05

## 2021-02-05 ENCOUNTER — TELEPHONE (OUTPATIENT)
Dept: CARDIOLOGY | Facility: HOSPITAL | Age: 86
End: 2021-02-05

## 2021-02-08 ENCOUNTER — PATIENT MESSAGE (OUTPATIENT)
Dept: PRIMARY CARE CLINIC | Facility: CLINIC | Age: 86
End: 2021-02-08

## 2021-02-08 ENCOUNTER — OFFICE VISIT (OUTPATIENT)
Dept: CARDIOLOGY | Facility: CLINIC | Age: 86
End: 2021-02-08
Payer: MEDICARE

## 2021-02-08 VITALS
BODY MASS INDEX: 21.59 KG/M2 | HEIGHT: 62 IN | WEIGHT: 117.31 LBS | SYSTOLIC BLOOD PRESSURE: 166 MMHG | DIASTOLIC BLOOD PRESSURE: 70 MMHG | HEART RATE: 69 BPM

## 2021-02-08 DIAGNOSIS — I11.9 HYPERTENSIVE HEART DISEASE WITHOUT HEART FAILURE: Chronic | ICD-10-CM

## 2021-02-08 DIAGNOSIS — Z95.0 PACEMAKER: Chronic | ICD-10-CM

## 2021-02-08 DIAGNOSIS — E78.00 PURE HYPERCHOLESTEROLEMIA: Chronic | ICD-10-CM

## 2021-02-08 DIAGNOSIS — I65.21 STENOSIS OF RIGHT CAROTID ARTERY: Chronic | ICD-10-CM

## 2021-02-08 DIAGNOSIS — I25.10 CORONARY ARTERY DISEASE INVOLVING NATIVE CORONARY ARTERY OF NATIVE HEART WITHOUT ANGINA PECTORIS: Primary | Chronic | ICD-10-CM

## 2021-02-08 PROBLEM — M75.80 ROTATOR CUFF TENDONITIS: Status: ACTIVE | Noted: 2021-02-08

## 2021-02-08 PROBLEM — M26.629 TEMPOROMANDIBULAR JOINT PAIN-DYSFUNCTION SYNDROME: Status: ACTIVE | Noted: 2021-02-08

## 2021-02-08 PROBLEM — Z95.5 PRESENCE OF STENT IN CORONARY ARTERY: Status: ACTIVE | Noted: 2021-02-08

## 2021-02-08 PROBLEM — E04.1 THYROID NODULE: Status: ACTIVE | Noted: 2021-02-08

## 2021-02-08 PROBLEM — M25.519 SHOULDER JOINT PAIN: Status: ACTIVE | Noted: 2021-02-08

## 2021-02-08 PROBLEM — H40.9 GLAUCOMA: Status: ACTIVE | Noted: 2021-02-08

## 2021-02-08 PROBLEM — Z78.9 STATIN INTOLERANCE: Status: ACTIVE | Noted: 2021-02-08

## 2021-02-08 PROBLEM — K44.9 HIATAL HERNIA: Status: ACTIVE | Noted: 2021-02-08

## 2021-02-08 PROCEDURE — 99214 OFFICE O/P EST MOD 30 MIN: CPT | Mod: S$GLB,,, | Performed by: INTERNAL MEDICINE

## 2021-02-08 PROCEDURE — 99999 PR PBB SHADOW E&M-EST. PATIENT-LVL IV: CPT | Mod: PBBFAC,,, | Performed by: INTERNAL MEDICINE

## 2021-02-08 PROCEDURE — 99999 PR PBB SHADOW E&M-EST. PATIENT-LVL IV: ICD-10-PCS | Mod: PBBFAC,,, | Performed by: INTERNAL MEDICINE

## 2021-02-08 PROCEDURE — 99214 PR OFFICE/OUTPT VISIT, EST, LEVL IV, 30-39 MIN: ICD-10-PCS | Mod: S$GLB,,, | Performed by: INTERNAL MEDICINE

## 2021-02-08 RX ORDER — LISINOPRIL 20 MG/1
10 TABLET ORAL 2 TIMES DAILY
Qty: 90 TABLET | Refills: 3 | Status: ON HOLD | OUTPATIENT
Start: 2021-02-08 | End: 2021-06-03 | Stop reason: HOSPADM

## 2021-02-08 RX ORDER — EZETIMIBE 10 MG/1
10 TABLET ORAL NIGHTLY
Qty: 90 TABLET | Refills: 3 | Status: SHIPPED | OUTPATIENT
Start: 2021-02-08 | End: 2022-07-24

## 2021-02-08 RX ORDER — EZETIMIBE 10 MG/1
10 TABLET ORAL NIGHTLY
COMMUNITY
Start: 2020-08-12 | End: 2021-02-08 | Stop reason: SDUPTHER

## 2021-02-08 RX ORDER — CLOPIDOGREL BISULFATE 75 MG/1
75 TABLET ORAL
COMMUNITY
Start: 2020-04-17 | End: 2021-02-08 | Stop reason: SDUPTHER

## 2021-02-08 RX ORDER — AA/PROT/LYSINE/METHIO/VIT C/B6 50-12.5 MG
10 TABLET ORAL DAILY
COMMUNITY

## 2021-02-08 RX ORDER — CLOPIDOGREL BISULFATE 75 MG/1
75 TABLET ORAL
Qty: 90 TABLET | Refills: 3 | Status: SHIPPED | OUTPATIENT
Start: 2021-02-09 | End: 2022-03-03

## 2021-02-08 RX ORDER — LEVOTHYROXINE SODIUM 75 UG/1
75 TABLET ORAL
COMMUNITY
Start: 2020-08-12 | End: 2021-02-08 | Stop reason: SDUPTHER

## 2021-02-11 ENCOUNTER — DOCUMENTATION ONLY (OUTPATIENT)
Dept: CARDIOLOGY | Facility: CLINIC | Age: 86
End: 2021-02-11

## 2021-02-19 ENCOUNTER — PATIENT MESSAGE (OUTPATIENT)
Dept: CARDIOLOGY | Facility: CLINIC | Age: 86
End: 2021-02-19

## 2021-02-19 DIAGNOSIS — I11.9 HYPERTENSIVE HEART DISEASE WITHOUT HEART FAILURE: Primary | ICD-10-CM

## 2021-02-19 RX ORDER — SPIRONOLACTONE 25 MG/1
12.5 TABLET ORAL DAILY
Qty: 15 TABLET | Refills: 11 | Status: ON HOLD | OUTPATIENT
Start: 2021-02-19 | End: 2021-06-03 | Stop reason: HOSPADM

## 2021-02-22 LAB
ALBUMIN: 4.2 GRAM/DL (ref 3.5–5)
ALP SERPL-CCNC: 63 UNIT/L (ref 38–126)
ALT SERPL W P-5'-P-CCNC: 10 UNIT/L (ref 7–56)
ANION GAP SERPL CALC-SCNC: 16 MEQ/L (ref 9–18)
AST SERPL-CCNC: 17 UNIT/L (ref 7–40)
BASOPHILS ABSOLUTE COUNT: 0 K/UL (ref 0–0.2)
BASOPHILS NFR BLD: 0.5 % (ref 0–2)
BILIRUB SERPL-MCNC: 0.3 MG/DL (ref 0–1.2)
BUN BLD-MCNC: 20 MG/DL (ref 7–21)
BUN/CREAT SERPL: 20 RATIO (ref 6–22)
CALC OSMOLALITY: 282 MOSM/KG (ref 275–295)
CALCIUM SERPL-MCNC: 9.7 MG/DL (ref 8.5–10.3)
CHLORIDE SERPL-SCNC: 102 MEQ/L (ref 98–107)
CHOL/HDLC RATIO: 2
CHOLEST SERPL-MSCNC: 195 MG/DL (ref 100–200)
CO2 SERPL-SCNC: 26 MEQ/L (ref 21–31)
CREAT SERPL-MCNC: 1 MG/DL (ref 0.5–1)
DIFFERENTIAL TYPE: ABNORMAL
EOSINOPHIL NFR BLD: 0.7 % (ref 0–4)
EOSINOPHILS ABSOLUTE COUNT: 0 K/UL (ref 0–0.7)
ERYTHROCYTE [DISTWIDTH] IN BLOOD BY AUTOMATED COUNT: 13.6 GRAM/DL (ref 12–15.3)
FREE T4: 1.7 NANOGRAM/DL (ref 0.9–1.7)
GFR: 50.7 ML/MIN/1.73M2
GLUCOSE SERPL-MCNC: 126 MG/DL (ref 70–100)
HCT VFR BLD AUTO: 37.2 % (ref 37–47)
HDLC SERPL-MCNC: 83 MG/DL (ref 40–75)
HGB BLD-MCNC: 12.9 GRAM/DL (ref 12–16)
LDLC SERPL CALC-MCNC: 96 MG/DL (ref 0–125)
LYMPHOCYTES %: 18.2 % (ref 15–45)
LYMPHOCYTES ABSOLUTE COUNT: 1.1 K/UL (ref 1–4.2)
MCH RBC QN AUTO: 32.6 PICOGRAM (ref 27–33)
MCHC RBC AUTO-ENTMCNC: 34.5 GRAM/DL (ref 32–36)
MCV RBC AUTO: 94.5 FEMTOLITER (ref 81–99)
MONOCYTES %: 5.5 % (ref 3–13)
MONOCYTES ABSOLUTE COUNT: 0.3 K/UL (ref 0.1–0.8)
NEUTROPHILS ABSOLUTE COUNT: 4.6 K/UL (ref 2.1–7.6)
NEUTROPHILS RELATIVE PERCENT: 75.1 % (ref 32–80)
NONHDLC SERPL-MCNC: 112 MG/DL (ref 60–125)
PLATELET # BLD AUTO: 203 K/UL (ref 150–350)
PMV BLD AUTO: 8.9 FEMTOLITER (ref 7–10.2)
POTASSIUM SERPL-SCNC: 4.5 MEQ/L (ref 3.5–5)
RBC # BLD AUTO: 3.94 MIL/UL (ref 4.2–5.4)
SODIUM BLD-SCNC: 139 MEQ/L (ref 135–145)
TOTAL PROTEIN: 7.8 GRAM/DL (ref 6.3–8.2)
TRIGL SERPL-MCNC: 99 MG/DL (ref 30–150)
TSH SERPL DL<=0.005 MIU/L-ACNC: 2.56 UIL/ML (ref 0.35–4)
WBC # BLD AUTO: 6.1 K/UL (ref 4.5–11)

## 2021-02-25 ENCOUNTER — PATIENT MESSAGE (OUTPATIENT)
Dept: PRIMARY CARE CLINIC | Facility: CLINIC | Age: 86
End: 2021-02-25

## 2021-02-25 DIAGNOSIS — R73.01 IMPAIRED FASTING GLUCOSE: Primary | ICD-10-CM

## 2021-02-25 DIAGNOSIS — F41.9 ANXIETY: Primary | ICD-10-CM

## 2021-02-25 DIAGNOSIS — M54.9 BACK PAIN, UNSPECIFIED BACK LOCATION, UNSPECIFIED BACK PAIN LATERALITY, UNSPECIFIED CHRONICITY: ICD-10-CM

## 2021-02-25 RX ORDER — HYDROCODONE BITARTRATE AND ACETAMINOPHEN 10; 325 MG/1; MG/1
1 TABLET ORAL EVERY 12 HOURS PRN
Qty: 60 TABLET | Refills: 0 | Status: SHIPPED | OUTPATIENT
Start: 2021-02-25 | End: 2021-04-19 | Stop reason: SDUPTHER

## 2021-02-25 RX ORDER — ALPRAZOLAM 0.25 MG/1
0.25 TABLET ORAL 2 TIMES DAILY PRN
Qty: 60 TABLET | Refills: 0 | Status: SHIPPED | OUTPATIENT
Start: 2021-02-25 | End: 2021-05-18 | Stop reason: SDUPTHER

## 2021-02-26 ENCOUNTER — TELEPHONE (OUTPATIENT)
Dept: PRIMARY CARE CLINIC | Facility: CLINIC | Age: 86
End: 2021-02-26

## 2021-02-26 DIAGNOSIS — F41.9 ANXIETY: Primary | ICD-10-CM

## 2021-02-26 RX ORDER — ESCITALOPRAM OXALATE 5 MG/1
5 TABLET ORAL DAILY
Qty: 30 TABLET | Refills: 11 | Status: SHIPPED | OUTPATIENT
Start: 2021-02-26 | End: 2021-04-09 | Stop reason: SDUPTHER

## 2021-03-01 ENCOUNTER — PATIENT MESSAGE (OUTPATIENT)
Dept: CARDIOLOGY | Facility: CLINIC | Age: 86
End: 2021-03-01

## 2021-03-04 ENCOUNTER — PATIENT MESSAGE (OUTPATIENT)
Dept: CARDIOLOGY | Facility: CLINIC | Age: 86
End: 2021-03-04

## 2021-03-10 ENCOUNTER — TELEPHONE (OUTPATIENT)
Dept: FAMILY MEDICINE | Facility: CLINIC | Age: 86
End: 2021-03-10

## 2021-03-11 ENCOUNTER — PATIENT MESSAGE (OUTPATIENT)
Dept: CARDIOLOGY | Facility: CLINIC | Age: 86
End: 2021-03-11

## 2021-03-11 ENCOUNTER — TELEPHONE (OUTPATIENT)
Dept: FAMILY MEDICINE | Facility: CLINIC | Age: 86
End: 2021-03-11

## 2021-03-16 ENCOUNTER — TELEPHONE (OUTPATIENT)
Dept: FAMILY MEDICINE | Facility: CLINIC | Age: 86
End: 2021-03-16

## 2021-03-29 ENCOUNTER — TELEPHONE (OUTPATIENT)
Dept: CARDIOLOGY | Facility: CLINIC | Age: 86
End: 2021-03-29

## 2021-04-05 ENCOUNTER — PATIENT MESSAGE (OUTPATIENT)
Dept: RESEARCH | Facility: HOSPITAL | Age: 86
End: 2021-04-05

## 2021-04-09 ENCOUNTER — OFFICE VISIT (OUTPATIENT)
Dept: PRIMARY CARE CLINIC | Facility: CLINIC | Age: 86
End: 2021-04-09
Payer: MEDICARE

## 2021-04-09 DIAGNOSIS — M54.9 BACK PAIN, UNSPECIFIED BACK LOCATION, UNSPECIFIED BACK PAIN LATERALITY, UNSPECIFIED CHRONICITY: Primary | ICD-10-CM

## 2021-04-09 DIAGNOSIS — F41.9 ANXIETY: ICD-10-CM

## 2021-04-09 DIAGNOSIS — F33.0 MILD RECURRENT MAJOR DEPRESSION: ICD-10-CM

## 2021-04-09 PROCEDURE — 1159F PR MEDICATION LIST DOCUMENTED IN MEDICAL RECORD: ICD-10-PCS | Mod: 95,,, | Performed by: INTERNAL MEDICINE

## 2021-04-09 PROCEDURE — 1159F MED LIST DOCD IN RCRD: CPT | Mod: 95,,, | Performed by: INTERNAL MEDICINE

## 2021-04-09 PROCEDURE — 99214 OFFICE O/P EST MOD 30 MIN: CPT | Mod: 95,,, | Performed by: INTERNAL MEDICINE

## 2021-04-09 PROCEDURE — 99214 PR OFFICE/OUTPT VISIT, EST, LEVL IV, 30-39 MIN: ICD-10-PCS | Mod: 95,,, | Performed by: INTERNAL MEDICINE

## 2021-04-09 RX ORDER — ESCITALOPRAM OXALATE 5 MG/1
5 TABLET ORAL DAILY
Qty: 30 TABLET | Refills: 11 | Status: SHIPPED | OUTPATIENT
Start: 2021-04-09 | End: 2021-04-28

## 2021-04-09 RX ORDER — DICLOFENAC SODIUM 10 MG/G
2 GEL TOPICAL DAILY
Qty: 100 G | Refills: 11 | Status: ON HOLD | OUTPATIENT
Start: 2021-04-09 | End: 2023-03-27 | Stop reason: SDUPTHER

## 2021-04-19 DIAGNOSIS — M54.9 BACK PAIN, UNSPECIFIED BACK LOCATION, UNSPECIFIED BACK PAIN LATERALITY, UNSPECIFIED CHRONICITY: ICD-10-CM

## 2021-04-19 RX ORDER — HYDROCODONE BITARTRATE AND ACETAMINOPHEN 10; 325 MG/1; MG/1
1 TABLET ORAL EVERY 12 HOURS PRN
Qty: 60 TABLET | Refills: 0 | Status: ON HOLD | OUTPATIENT
Start: 2021-04-19 | End: 2021-06-03 | Stop reason: HOSPADM

## 2021-04-20 ENCOUNTER — PATIENT MESSAGE (OUTPATIENT)
Dept: PRIMARY CARE CLINIC | Facility: CLINIC | Age: 86
End: 2021-04-20

## 2021-04-20 DIAGNOSIS — M54.9 BACK PAIN, UNSPECIFIED BACK LOCATION, UNSPECIFIED BACK PAIN LATERALITY, UNSPECIFIED CHRONICITY: Primary | ICD-10-CM

## 2021-04-21 ENCOUNTER — TELEPHONE (OUTPATIENT)
Dept: FAMILY MEDICINE | Facility: CLINIC | Age: 86
End: 2021-04-21

## 2021-04-24 ENCOUNTER — OFFICE VISIT (OUTPATIENT)
Dept: URGENT CARE | Facility: CLINIC | Age: 86
End: 2021-04-24
Payer: MEDICARE

## 2021-04-24 VITALS
BODY MASS INDEX: 21.9 KG/M2 | SYSTOLIC BLOOD PRESSURE: 167 MMHG | HEIGHT: 62 IN | OXYGEN SATURATION: 98 % | HEART RATE: 69 BPM | WEIGHT: 119 LBS | TEMPERATURE: 97 F | DIASTOLIC BLOOD PRESSURE: 75 MMHG

## 2021-04-24 DIAGNOSIS — M54.9 ACUTE MIDLINE BACK PAIN, UNSPECIFIED BACK LOCATION: Primary | ICD-10-CM

## 2021-04-24 PROCEDURE — 99203 PR OFFICE/OUTPT VISIT, NEW, LEVL III, 30-44 MIN: ICD-10-PCS | Mod: S$GLB,,, | Performed by: NURSE PRACTITIONER

## 2021-04-24 PROCEDURE — 99203 OFFICE O/P NEW LOW 30 MIN: CPT | Mod: S$GLB,,, | Performed by: NURSE PRACTITIONER

## 2021-04-24 RX ORDER — METHOCARBAMOL 500 MG/1
TABLET, FILM COATED ORAL
Qty: 64 TABLET | Refills: 0 | Status: SHIPPED | OUTPATIENT
Start: 2021-04-24 | End: 2021-05-13

## 2021-04-26 ENCOUNTER — PATIENT MESSAGE (OUTPATIENT)
Dept: ORTHOPEDICS | Facility: CLINIC | Age: 86
End: 2021-04-26

## 2021-04-27 ENCOUNTER — PATIENT OUTREACH (OUTPATIENT)
Dept: ADMINISTRATIVE | Facility: OTHER | Age: 86
End: 2021-04-27

## 2021-04-28 ENCOUNTER — HOSPITAL ENCOUNTER (OUTPATIENT)
Dept: RADIOLOGY | Facility: HOSPITAL | Age: 86
Discharge: HOME | End: 2021-04-28
Attending: ORTHOPAEDIC SURGERY
Payer: MEDICARE

## 2021-04-28 ENCOUNTER — OFFICE VISIT (OUTPATIENT)
Dept: ORTHOPEDICS | Facility: CLINIC | Age: 86
End: 2021-04-28
Payer: MEDICARE

## 2021-04-28 VITALS — WEIGHT: 119.06 LBS | BODY MASS INDEX: 21.91 KG/M2 | HEIGHT: 62 IN

## 2021-04-28 DIAGNOSIS — M54.9 BACK PAIN, UNSPECIFIED BACK LOCATION, UNSPECIFIED BACK PAIN LATERALITY, UNSPECIFIED CHRONICITY: ICD-10-CM

## 2021-04-28 DIAGNOSIS — M50.30 DDD (DEGENERATIVE DISC DISEASE), CERVICAL: ICD-10-CM

## 2021-04-28 PROCEDURE — 1101F PT FALLS ASSESS-DOCD LE1/YR: CPT | Mod: CPTII,S$GLB,, | Performed by: ORTHOPAEDIC SURGERY

## 2021-04-28 PROCEDURE — 99999 PR PBB SHADOW E&M-EST. PATIENT-LVL III: ICD-10-PCS | Mod: PBBFAC,,, | Performed by: ORTHOPAEDIC SURGERY

## 2021-04-28 PROCEDURE — 72050 X-RAY EXAM NECK SPINE 4/5VWS: CPT | Mod: 26,,, | Performed by: RADIOLOGY

## 2021-04-28 PROCEDURE — 1125F AMNT PAIN NOTED PAIN PRSNT: CPT | Mod: S$GLB,,, | Performed by: ORTHOPAEDIC SURGERY

## 2021-04-28 PROCEDURE — 72070 X-RAY EXAM THORAC SPINE 2VWS: CPT | Mod: TC

## 2021-04-28 PROCEDURE — 1159F MED LIST DOCD IN RCRD: CPT | Mod: S$GLB,,, | Performed by: ORTHOPAEDIC SURGERY

## 2021-04-28 PROCEDURE — 72050 XR CERVICAL SPINE AP LAT WITH FLEX EXTEN: ICD-10-PCS | Mod: 26,,, | Performed by: RADIOLOGY

## 2021-04-28 PROCEDURE — 3288F FALL RISK ASSESSMENT DOCD: CPT | Mod: CPTII,S$GLB,, | Performed by: ORTHOPAEDIC SURGERY

## 2021-04-28 PROCEDURE — 72050 X-RAY EXAM NECK SPINE 4/5VWS: CPT | Mod: TC

## 2021-04-28 PROCEDURE — 1159F PR MEDICATION LIST DOCUMENTED IN MEDICAL RECORD: ICD-10-PCS | Mod: S$GLB,,, | Performed by: ORTHOPAEDIC SURGERY

## 2021-04-28 PROCEDURE — 99999 PR PBB SHADOW E&M-EST. PATIENT-LVL III: CPT | Mod: PBBFAC,,, | Performed by: ORTHOPAEDIC SURGERY

## 2021-04-28 PROCEDURE — 99214 OFFICE O/P EST MOD 30 MIN: CPT | Mod: S$GLB,,, | Performed by: ORTHOPAEDIC SURGERY

## 2021-04-28 PROCEDURE — 72070 XR THORACIC SPINE AP LATERAL: ICD-10-PCS | Mod: 26,,, | Performed by: RADIOLOGY

## 2021-04-28 PROCEDURE — 72070 X-RAY EXAM THORAC SPINE 2VWS: CPT | Mod: 26,,, | Performed by: RADIOLOGY

## 2021-04-28 PROCEDURE — 99214 PR OFFICE/OUTPT VISIT, EST, LEVL IV, 30-39 MIN: ICD-10-PCS | Mod: S$GLB,,, | Performed by: ORTHOPAEDIC SURGERY

## 2021-04-28 PROCEDURE — 1101F PR PT FALLS ASSESS DOC 0-1 FALLS W/OUT INJ PAST YR: ICD-10-PCS | Mod: CPTII,S$GLB,, | Performed by: ORTHOPAEDIC SURGERY

## 2021-04-28 PROCEDURE — 3288F PR FALLS RISK ASSESSMENT DOCUMENTED: ICD-10-PCS | Mod: CPTII,S$GLB,, | Performed by: ORTHOPAEDIC SURGERY

## 2021-04-28 PROCEDURE — 1125F PR PAIN SEVERITY QUANTIFIED, PAIN PRESENT: ICD-10-PCS | Mod: S$GLB,,, | Performed by: ORTHOPAEDIC SURGERY

## 2021-04-28 RX ORDER — TRAMADOL HYDROCHLORIDE 50 MG/1
50 TABLET ORAL EVERY 8 HOURS PRN
Qty: 21 TABLET | Refills: 0 | Status: ON HOLD | OUTPATIENT
Start: 2021-04-28 | End: 2021-06-03 | Stop reason: HOSPADM

## 2021-04-28 RX ORDER — CALCITONIN SALMON 200 [IU]/.09ML
1 SPRAY, METERED NASAL DAILY
Qty: 1 BOTTLE | Refills: 0 | Status: ON HOLD | OUTPATIENT
Start: 2021-04-28 | End: 2023-03-23

## 2021-04-29 ENCOUNTER — PATIENT MESSAGE (OUTPATIENT)
Dept: PRIMARY CARE CLINIC | Facility: CLINIC | Age: 86
End: 2021-04-29

## 2021-05-01 ENCOUNTER — CLINICAL SUPPORT (OUTPATIENT)
Dept: CARDIOLOGY | Facility: HOSPITAL | Age: 86
End: 2021-05-01
Payer: MEDICARE

## 2021-05-01 DIAGNOSIS — Z95.0 PRESENCE OF CARDIAC PACEMAKER: ICD-10-CM

## 2021-05-01 PROCEDURE — 93294 REM INTERROG EVL PM/LDLS PM: CPT | Mod: ,,, | Performed by: INTERNAL MEDICINE

## 2021-05-01 PROCEDURE — 93294 CARDIAC DEVICE CHECK - REMOTE: ICD-10-PCS | Mod: ,,, | Performed by: INTERNAL MEDICINE

## 2021-05-01 PROCEDURE — 93296 REM INTERROG EVL PM/IDS: CPT | Performed by: INTERNAL MEDICINE

## 2021-05-07 ENCOUNTER — TELEPHONE (OUTPATIENT)
Dept: PRIMARY CARE CLINIC | Facility: CLINIC | Age: 86
End: 2021-05-07

## 2021-05-07 ENCOUNTER — TELEPHONE (OUTPATIENT)
Dept: FAMILY MEDICINE | Facility: CLINIC | Age: 86
End: 2021-05-07

## 2021-05-13 ENCOUNTER — PATIENT MESSAGE (OUTPATIENT)
Dept: ORTHOPEDICS | Facility: CLINIC | Age: 86
End: 2021-05-13

## 2021-05-18 DIAGNOSIS — F41.9 ANXIETY: ICD-10-CM

## 2021-05-18 RX ORDER — ALPRAZOLAM 0.25 MG/1
0.25 TABLET ORAL 2 TIMES DAILY PRN
Qty: 60 TABLET | Refills: 0 | Status: ON HOLD | OUTPATIENT
Start: 2021-05-18 | End: 2021-06-03 | Stop reason: SDUPTHER

## 2021-05-21 ENCOUNTER — HOSPITAL ENCOUNTER (EMERGENCY)
Facility: HOSPITAL | Age: 86
Discharge: HOME | End: 2021-05-21
Attending: EMERGENCY MEDICINE
Payer: MEDICARE

## 2021-05-21 VITALS
RESPIRATION RATE: 18 BRPM | HEART RATE: 61 BPM | SYSTOLIC BLOOD PRESSURE: 181 MMHG | BODY MASS INDEX: 21.16 KG/M2 | TEMPERATURE: 99 F | OXYGEN SATURATION: 99 % | WEIGHT: 115 LBS | DIASTOLIC BLOOD PRESSURE: 79 MMHG | HEIGHT: 62 IN

## 2021-05-21 DIAGNOSIS — S22.070D COMPRESSION FRACTURE OF T10 VERTEBRA WITH ROUTINE HEALING, SUBSEQUENT ENCOUNTER: Primary | ICD-10-CM

## 2021-05-21 LAB
BASOPHILS # BLD AUTO: 0.02 K/UL (ref 0–0.2)
BASOPHILS NFR BLD: 0.3 % (ref 0–1.9)
BUN SERPL-MCNC: 24 MG/DL (ref 6–30)
CHLORIDE SERPL-SCNC: 99 MMOL/L (ref 95–110)
CREAT SERPL-MCNC: 1.1 MG/DL (ref 0.5–1.4)
DIFFERENTIAL METHOD: ABNORMAL
EOSINOPHIL # BLD AUTO: 0 K/UL (ref 0–0.5)
EOSINOPHIL NFR BLD: 0.3 % (ref 0–8)
ERYTHROCYTE [DISTWIDTH] IN BLOOD BY AUTOMATED COUNT: 13.2 % (ref 11.5–14.5)
GLUCOSE SERPL-MCNC: 113 MG/DL (ref 70–110)
HCT VFR BLD AUTO: 37.3 % (ref 37–48.5)
HCT VFR BLD CALC: 38 %PCV (ref 36–54)
HGB BLD-MCNC: 12.5 G/DL (ref 12–16)
IMM GRANULOCYTES # BLD AUTO: 0.02 K/UL (ref 0–0.04)
IMM GRANULOCYTES NFR BLD AUTO: 0.3 % (ref 0–0.5)
LYMPHOCYTES # BLD AUTO: 1.6 K/UL (ref 1–4.8)
LYMPHOCYTES NFR BLD: 21.7 % (ref 18–48)
MCH RBC QN AUTO: 32.1 PG (ref 27–31)
MCHC RBC AUTO-ENTMCNC: 33.5 G/DL (ref 32–36)
MCV RBC AUTO: 96 FL (ref 82–98)
MONOCYTES # BLD AUTO: 0.5 K/UL (ref 0.3–1)
MONOCYTES NFR BLD: 6.9 % (ref 4–15)
NEUTROPHILS # BLD AUTO: 5.1 K/UL (ref 1.8–7.7)
NEUTROPHILS NFR BLD: 70.5 % (ref 38–73)
NRBC BLD-RTO: 0 /100 WBC
PLATELET # BLD AUTO: 220 K/UL (ref 150–450)
PMV BLD AUTO: 10.2 FL (ref 9.2–12.9)
POC IONIZED CALCIUM: 1.22 MMOL/L (ref 1.06–1.42)
POC TCO2 (MEASURED): 23 MMOL/L (ref 23–29)
POTASSIUM BLD-SCNC: 4.4 MMOL/L (ref 3.5–5.1)
RBC # BLD AUTO: 3.89 M/UL (ref 4–5.4)
SAMPLE: ABNORMAL
SODIUM BLD-SCNC: 131 MMOL/L (ref 136–145)
WBC # BLD AUTO: 7.27 K/UL (ref 3.9–12.7)

## 2021-05-21 PROCEDURE — 99285 PR EMERGENCY DEPT VISIT,LEVEL V: ICD-10-PCS | Mod: ,,, | Performed by: PHYSICIAN ASSISTANT

## 2021-05-21 PROCEDURE — 96374 THER/PROPH/DIAG INJ IV PUSH: CPT

## 2021-05-21 PROCEDURE — 85025 COMPLETE CBC W/AUTO DIFF WBC: CPT | Performed by: PHYSICIAN ASSISTANT

## 2021-05-21 PROCEDURE — 80047 BASIC METABLC PNL IONIZED CA: CPT

## 2021-05-21 PROCEDURE — 99284 EMERGENCY DEPT VISIT MOD MDM: CPT | Mod: 25

## 2021-05-21 PROCEDURE — 25000003 PHARM REV CODE 250: Performed by: PHYSICIAN ASSISTANT

## 2021-05-21 PROCEDURE — 99285 EMERGENCY DEPT VISIT HI MDM: CPT | Mod: ,,, | Performed by: PHYSICIAN ASSISTANT

## 2021-05-21 PROCEDURE — 63600175 PHARM REV CODE 636 W HCPCS: Performed by: PHYSICIAN ASSISTANT

## 2021-05-21 RX ORDER — MORPHINE SULFATE 4 MG/ML
4 INJECTION, SOLUTION INTRAMUSCULAR; INTRAVENOUS
Status: COMPLETED | OUTPATIENT
Start: 2021-05-21 | End: 2021-05-21

## 2021-05-21 RX ORDER — LIDOCAINE 50 MG/G
1 PATCH TOPICAL ONCE
Status: DISCONTINUED | OUTPATIENT
Start: 2021-05-21 | End: 2021-05-21 | Stop reason: HOSPADM

## 2021-05-21 RX ORDER — ACETAMINOPHEN 500 MG
1000 TABLET ORAL EVERY 8 HOURS PRN
Qty: 30 TABLET | Refills: 0 | Status: ON HOLD | OUTPATIENT
Start: 2021-05-21 | End: 2021-06-04 | Stop reason: HOSPADM

## 2021-05-21 RX ORDER — ACETAMINOPHEN 500 MG
1000 TABLET ORAL
Status: COMPLETED | OUTPATIENT
Start: 2021-05-21 | End: 2021-05-21

## 2021-05-21 RX ORDER — LIDOCAINE 50 MG/G
1 PATCH TOPICAL DAILY
Qty: 30 PATCH | Refills: 0 | Status: SHIPPED | OUTPATIENT
Start: 2021-05-21

## 2021-05-21 RX ADMIN — ACETAMINOPHEN 1000 MG: 500 TABLET, FILM COATED ORAL at 08:05

## 2021-05-21 RX ADMIN — MORPHINE SULFATE 4 MG: 4 INJECTION INTRAVENOUS at 09:05

## 2021-05-21 RX ADMIN — LIDOCAINE 1 PATCH: 50 PATCH TOPICAL at 08:05

## 2021-05-24 ENCOUNTER — TELEPHONE (OUTPATIENT)
Dept: FAMILY MEDICINE | Facility: CLINIC | Age: 86
End: 2021-05-24

## 2021-05-25 ENCOUNTER — HOSPITAL ENCOUNTER (EMERGENCY)
Facility: HOSPITAL | Age: 86
Discharge: HOME | End: 2021-05-25
Attending: EMERGENCY MEDICINE
Payer: MEDICARE

## 2021-05-25 ENCOUNTER — PATIENT OUTREACH (OUTPATIENT)
Dept: EMERGENCY MEDICINE | Facility: HOSPITAL | Age: 86
End: 2021-05-25

## 2021-05-25 VITALS
WEIGHT: 115 LBS | RESPIRATION RATE: 20 BRPM | OXYGEN SATURATION: 99 % | SYSTOLIC BLOOD PRESSURE: 154 MMHG | HEART RATE: 99 BPM | TEMPERATURE: 98 F | DIASTOLIC BLOOD PRESSURE: 76 MMHG | BODY MASS INDEX: 21.03 KG/M2

## 2021-05-25 DIAGNOSIS — M54.6 RIGHT-SIDED THORACIC BACK PAIN, UNSPECIFIED CHRONICITY: ICD-10-CM

## 2021-05-25 DIAGNOSIS — M54.9 BACK PAIN: ICD-10-CM

## 2021-05-25 DIAGNOSIS — S22.070A COMPRESSION FRACTURE OF T10 VERTEBRA, INITIAL ENCOUNTER: Primary | ICD-10-CM

## 2021-05-25 LAB
BASOPHILS # BLD AUTO: 0.03 K/UL (ref 0–0.2)
BASOPHILS NFR BLD: 0.5 % (ref 0–1.9)
BILIRUB UR QL STRIP: NEGATIVE
BUN SERPL-MCNC: 19 MG/DL (ref 6–30)
CHLORIDE SERPL-SCNC: 95 MMOL/L (ref 95–110)
CLARITY UR REFRACT.AUTO: CLEAR
COLOR UR AUTO: ABNORMAL
CREAT SERPL-MCNC: 1.2 MG/DL (ref 0.5–1.4)
DIFFERENTIAL METHOD: ABNORMAL
EOSINOPHIL # BLD AUTO: 0 K/UL (ref 0–0.5)
EOSINOPHIL NFR BLD: 0.2 % (ref 0–8)
ERYTHROCYTE [DISTWIDTH] IN BLOOD BY AUTOMATED COUNT: 13.2 % (ref 11.5–14.5)
GLUCOSE SERPL-MCNC: 110 MG/DL (ref 70–110)
GLUCOSE UR QL STRIP: NEGATIVE
HCT VFR BLD AUTO: 38.5 % (ref 37–48.5)
HCT VFR BLD CALC: 41 %PCV (ref 36–54)
HGB BLD-MCNC: 12.8 G/DL (ref 12–16)
HGB UR QL STRIP: NEGATIVE
IMM GRANULOCYTES # BLD AUTO: 0.03 K/UL (ref 0–0.04)
IMM GRANULOCYTES NFR BLD AUTO: 0.5 % (ref 0–0.5)
KETONES UR QL STRIP: ABNORMAL
LEUKOCYTE ESTERASE UR QL STRIP: NEGATIVE
LYMPHOCYTES # BLD AUTO: 1.4 K/UL (ref 1–4.8)
LYMPHOCYTES NFR BLD: 22.4 % (ref 18–48)
MCH RBC QN AUTO: 31.3 PG (ref 27–31)
MCHC RBC AUTO-ENTMCNC: 33.2 G/DL (ref 32–36)
MCV RBC AUTO: 94 FL (ref 82–98)
MONOCYTES # BLD AUTO: 0.6 K/UL (ref 0.3–1)
MONOCYTES NFR BLD: 9.2 % (ref 4–15)
NEUTROPHILS # BLD AUTO: 4.1 K/UL (ref 1.8–7.7)
NEUTROPHILS NFR BLD: 67.2 % (ref 38–73)
NITRITE UR QL STRIP: NEGATIVE
NRBC BLD-RTO: 0 /100 WBC
PH UR STRIP: 6 [PH] (ref 5–8)
PLATELET # BLD AUTO: 223 K/UL (ref 150–450)
PMV BLD AUTO: 9.9 FL (ref 9.2–12.9)
POC IONIZED CALCIUM: 1.12 MMOL/L (ref 1.06–1.42)
POC TCO2 (MEASURED): 24 MMOL/L (ref 23–29)
POTASSIUM BLD-SCNC: 4.6 MMOL/L (ref 3.5–5.1)
PROT UR QL STRIP: NEGATIVE
RBC # BLD AUTO: 4.09 M/UL (ref 4–5.4)
SAMPLE: ABNORMAL
SODIUM BLD-SCNC: 129 MMOL/L (ref 136–145)
SP GR UR STRIP: 1.01 (ref 1–1.03)
URN SPEC COLLECT METH UR: ABNORMAL
WBC # BLD AUTO: 6.08 K/UL (ref 3.9–12.7)

## 2021-05-25 PROCEDURE — 85025 COMPLETE CBC W/AUTO DIFF WBC: CPT | Performed by: PHYSICIAN ASSISTANT

## 2021-05-25 PROCEDURE — 80047 BASIC METABLC PNL IONIZED CA: CPT

## 2021-05-25 PROCEDURE — 93010 EKG 12-LEAD: ICD-10-PCS | Mod: ,,, | Performed by: INTERNAL MEDICINE

## 2021-05-25 PROCEDURE — 99285 EMERGENCY DEPT VISIT HI MDM: CPT | Mod: 25

## 2021-05-25 PROCEDURE — 36000 PLACE NEEDLE IN VEIN: CPT

## 2021-05-25 PROCEDURE — 25000003 PHARM REV CODE 250: Performed by: PHYSICIAN ASSISTANT

## 2021-05-25 PROCEDURE — 99284 PR EMERGENCY DEPT VISIT,LEVEL IV: ICD-10-PCS | Mod: ,,, | Performed by: EMERGENCY MEDICINE

## 2021-05-25 PROCEDURE — 81003 URINALYSIS AUTO W/O SCOPE: CPT | Performed by: PHYSICIAN ASSISTANT

## 2021-05-25 PROCEDURE — 93005 ELECTROCARDIOGRAM TRACING: CPT

## 2021-05-25 PROCEDURE — 93010 ELECTROCARDIOGRAM REPORT: CPT | Mod: ,,, | Performed by: INTERNAL MEDICINE

## 2021-05-25 PROCEDURE — 99284 EMERGENCY DEPT VISIT MOD MDM: CPT | Mod: ,,, | Performed by: EMERGENCY MEDICINE

## 2021-05-25 RX ORDER — LIDOCAINE 50 MG/G
1 PATCH TOPICAL
Status: DISCONTINUED | OUTPATIENT
Start: 2021-05-25 | End: 2021-05-25 | Stop reason: HOSPADM

## 2021-05-25 RX ORDER — AMLODIPINE BESYLATE 5 MG/1
5 TABLET ORAL
Status: COMPLETED | OUTPATIENT
Start: 2021-05-25 | End: 2021-05-25

## 2021-05-25 RX ORDER — LISINOPRIL 10 MG/1
10 TABLET ORAL
Status: COMPLETED | OUTPATIENT
Start: 2021-05-25 | End: 2021-05-25

## 2021-05-25 RX ORDER — NAPROXEN 250 MG/1
250 TABLET ORAL 2 TIMES DAILY WITH MEALS
Qty: 8 TABLET | Refills: 0 | Status: SHIPPED | OUTPATIENT
Start: 2021-05-25 | End: 2021-05-29

## 2021-05-25 RX ADMIN — AMLODIPINE BESYLATE 5 MG: 5 TABLET ORAL at 01:05

## 2021-05-25 RX ADMIN — LIDOCAINE 1 PATCH: 50 PATCH TOPICAL at 08:05

## 2021-05-25 RX ADMIN — LISINOPRIL 10 MG: 10 TABLET ORAL at 01:05

## 2021-05-28 ENCOUNTER — PATIENT OUTREACH (OUTPATIENT)
Dept: ADMINISTRATIVE | Facility: OTHER | Age: 86
End: 2021-05-28

## 2021-05-29 ENCOUNTER — NURSE TRIAGE (OUTPATIENT)
Dept: ADMINISTRATIVE | Facility: CLINIC | Age: 86
End: 2021-05-29

## 2021-05-29 ENCOUNTER — HOSPITAL ENCOUNTER (INPATIENT)
Facility: HOSPITAL | Age: 86
LOS: 6 days | Discharge: NURSING, REHAB OR LONG-TERM CARE | DRG: 378 | End: 2021-06-04
Attending: EMERGENCY MEDICINE | Admitting: HOSPITALIST
Payer: MEDICARE

## 2021-05-29 DIAGNOSIS — R09.89 CARDIAC COMPLAINT: ICD-10-CM

## 2021-05-29 DIAGNOSIS — R07.9 CHEST PAIN: ICD-10-CM

## 2021-05-29 DIAGNOSIS — K62.5 RECTAL BLEEDING: Primary | ICD-10-CM

## 2021-05-29 DIAGNOSIS — K92.1 GASTROINTESTINAL HEMORRHAGE WITH MELENA: ICD-10-CM

## 2021-05-29 DIAGNOSIS — D64.9 SYMPTOMATIC ANEMIA: ICD-10-CM

## 2021-05-29 DIAGNOSIS — F41.9 ANXIETY: ICD-10-CM

## 2021-05-29 DIAGNOSIS — R53.83 FATIGUE: ICD-10-CM

## 2021-05-29 PROBLEM — K92.0 HEMATEMESIS: Status: ACTIVE | Noted: 2021-05-29

## 2021-05-29 PROBLEM — R33.9 URINARY RETENTION: Status: ACTIVE | Noted: 2021-05-29

## 2021-05-29 PROBLEM — I25.10 CAD (CORONARY ARTERY DISEASE): Status: ACTIVE | Noted: 2021-05-29

## 2021-05-29 PROBLEM — M48.50XA SPINAL COMPRESSION FRACTURE: Status: ACTIVE | Noted: 2021-05-29

## 2021-05-29 PROBLEM — E87.20 NORMAL ANION GAP METABOLIC ACIDOSIS: Status: ACTIVE | Noted: 2021-05-29

## 2021-05-29 PROBLEM — I10 HTN (HYPERTENSION): Status: ACTIVE | Noted: 2021-05-29

## 2021-05-29 LAB
ABO + RH BLD: NORMAL
ALBUMIN SERPL BCP-MCNC: 3.5 G/DL (ref 3.5–5.2)
ALP SERPL-CCNC: 52 U/L (ref 55–135)
ALT SERPL W/O P-5'-P-CCNC: 14 U/L (ref 10–44)
ANION GAP SERPL CALC-SCNC: 10 MMOL/L (ref 8–16)
AST SERPL-CCNC: 20 U/L (ref 10–40)
BASOPHILS # BLD AUTO: 0.01 K/UL (ref 0–0.2)
BASOPHILS NFR BLD: 0.1 % (ref 0–1.9)
BILIRUB SERPL-MCNC: 0.4 MG/DL (ref 0.1–1)
BILIRUB UR QL STRIP: NEGATIVE
BLD GP AB SCN CELLS X3 SERPL QL: NORMAL
BLD PROD TYP BPU: NORMAL
BLD PROD TYP BPU: NORMAL
BLOOD UNIT EXPIRATION DATE: NORMAL
BLOOD UNIT EXPIRATION DATE: NORMAL
BLOOD UNIT TYPE CODE: 7300
BLOOD UNIT TYPE CODE: 7300
BLOOD UNIT TYPE: NORMAL
BLOOD UNIT TYPE: NORMAL
BUN SERPL-MCNC: 50 MG/DL (ref 6–30)
BUN SERPL-MCNC: 57 MG/DL (ref 10–30)
CALCIUM SERPL-MCNC: 9.6 MG/DL (ref 8.7–10.5)
CHLORIDE SERPL-SCNC: 97 MMOL/L (ref 95–110)
CHLORIDE SERPL-SCNC: 99 MMOL/L (ref 95–110)
CLARITY UR REFRACT.AUTO: CLEAR
CO2 SERPL-SCNC: 19 MMOL/L (ref 23–29)
CODING SYSTEM: NORMAL
CODING SYSTEM: NORMAL
COLOR UR AUTO: COLORLESS
CREAT SERPL-MCNC: 1.1 MG/DL (ref 0.5–1.4)
CREAT SERPL-MCNC: 1.2 MG/DL (ref 0.5–1.4)
CTP QC/QA: YES
DIFFERENTIAL METHOD: ABNORMAL
DISPENSE STATUS: NORMAL
DISPENSE STATUS: NORMAL
EOSINOPHIL # BLD AUTO: 0 K/UL (ref 0–0.5)
EOSINOPHIL NFR BLD: 0 % (ref 0–8)
ERYTHROCYTE [DISTWIDTH] IN BLOOD BY AUTOMATED COUNT: 13.3 % (ref 11.5–14.5)
EST. GFR  (AFRICAN AMERICAN): 48.6 ML/MIN/1.73 M^2
EST. GFR  (NON AFRICAN AMERICAN): 42.2 ML/MIN/1.73 M^2
GLUCOSE SERPL-MCNC: 100 MG/DL (ref 70–110)
GLUCOSE SERPL-MCNC: 103 MG/DL (ref 70–110)
GLUCOSE UR QL STRIP: NEGATIVE
HCT VFR BLD AUTO: 29.3 % (ref 37–48.5)
HCT VFR BLD CALC: 29 %PCV (ref 36–54)
HGB BLD-MCNC: 9.8 G/DL (ref 12–16)
HGB UR QL STRIP: NEGATIVE
IMM GRANULOCYTES # BLD AUTO: 0.04 K/UL (ref 0–0.04)
IMM GRANULOCYTES NFR BLD AUTO: 0.5 % (ref 0–0.5)
KETONES UR QL STRIP: NEGATIVE
LACTATE SERPL-SCNC: 1.1 MMOL/L (ref 0.5–2.2)
LEUKOCYTE ESTERASE UR QL STRIP: NEGATIVE
LIPASE SERPL-CCNC: 89 U/L (ref 4–60)
LYMPHOCYTES # BLD AUTO: 1.5 K/UL (ref 1–4.8)
LYMPHOCYTES NFR BLD: 17.9 % (ref 18–48)
MCH RBC QN AUTO: 32.3 PG (ref 27–31)
MCHC RBC AUTO-ENTMCNC: 33.4 G/DL (ref 32–36)
MCV RBC AUTO: 97 FL (ref 82–98)
MONOCYTES # BLD AUTO: 0.4 K/UL (ref 0.3–1)
MONOCYTES NFR BLD: 4.9 % (ref 4–15)
NEUTROPHILS # BLD AUTO: 6.4 K/UL (ref 1.8–7.7)
NEUTROPHILS NFR BLD: 76.6 % (ref 38–73)
NITRITE UR QL STRIP: NEGATIVE
NRBC BLD-RTO: 0 /100 WBC
NUM UNITS TRANS PACKED RBC: NORMAL
NUM UNITS TRANS PACKED RBC: NORMAL
PH UR STRIP: 6 [PH] (ref 5–8)
PLATELET # BLD AUTO: 205 K/UL (ref 150–450)
PMV BLD AUTO: 10.8 FL (ref 9.2–12.9)
POC IONIZED CALCIUM: 1.09 MMOL/L (ref 1.06–1.42)
POC TCO2 (MEASURED): 20 MMOL/L (ref 23–29)
POTASSIUM BLD-SCNC: 5 MMOL/L (ref 3.5–5.1)
POTASSIUM SERPL-SCNC: 5.1 MMOL/L (ref 3.5–5.1)
PROT SERPL-MCNC: 7.2 G/DL (ref 6–8.4)
PROT UR QL STRIP: NEGATIVE
RBC # BLD AUTO: 3.03 M/UL (ref 4–5.4)
SAMPLE: ABNORMAL
SARS-COV-2 RDRP RESP QL NAA+PROBE: NEGATIVE
SODIUM BLD-SCNC: 127 MMOL/L (ref 136–145)
SODIUM SERPL-SCNC: 126 MMOL/L (ref 136–145)
SP GR UR STRIP: 1.02 (ref 1–1.03)
URN SPEC COLLECT METH UR: ABNORMAL
WBC # BLD AUTO: 8.39 K/UL (ref 3.9–12.7)

## 2021-05-29 PROCEDURE — 51702 INSERT TEMP BLADDER CATH: CPT

## 2021-05-29 PROCEDURE — 36430 TRANSFUSION BLD/BLD COMPNT: CPT

## 2021-05-29 PROCEDURE — 86900 BLOOD TYPING SEROLOGIC ABO: CPT | Performed by: PHYSICIAN ASSISTANT

## 2021-05-29 PROCEDURE — 99291 CRITICAL CARE FIRST HOUR: CPT | Mod: 25

## 2021-05-29 PROCEDURE — 20600001 HC STEP DOWN PRIVATE ROOM

## 2021-05-29 PROCEDURE — 83690 ASSAY OF LIPASE: CPT | Performed by: PHYSICIAN ASSISTANT

## 2021-05-29 PROCEDURE — 80053 COMPREHEN METABOLIC PANEL: CPT | Performed by: PHYSICIAN ASSISTANT

## 2021-05-29 PROCEDURE — 80047 BASIC METABLC PNL IONIZED CA: CPT

## 2021-05-29 PROCEDURE — 93005 ELECTROCARDIOGRAM TRACING: CPT

## 2021-05-29 PROCEDURE — 25500020 PHARM REV CODE 255: Performed by: EMERGENCY MEDICINE

## 2021-05-29 PROCEDURE — C9113 INJ PANTOPRAZOLE SODIUM, VIA: HCPCS | Performed by: PHYSICIAN ASSISTANT

## 2021-05-29 PROCEDURE — 93010 EKG 12-LEAD: ICD-10-PCS | Mod: ,,, | Performed by: INTERNAL MEDICINE

## 2021-05-29 PROCEDURE — 25000003 PHARM REV CODE 250: Performed by: STUDENT IN AN ORGANIZED HEALTH CARE EDUCATION/TRAINING PROGRAM

## 2021-05-29 PROCEDURE — 99223 1ST HOSP IP/OBS HIGH 75: CPT | Mod: AI,GC,, | Performed by: HOSPITALIST

## 2021-05-29 PROCEDURE — 86920 COMPATIBILITY TEST SPIN: CPT | Performed by: EMERGENCY MEDICINE

## 2021-05-29 PROCEDURE — 99291 PR CRITICAL CARE, E/M 30-74 MINUTES: ICD-10-PCS | Mod: CR,CS,, | Performed by: EMERGENCY MEDICINE

## 2021-05-29 PROCEDURE — 85025 COMPLETE CBC W/AUTO DIFF WBC: CPT | Performed by: PHYSICIAN ASSISTANT

## 2021-05-29 PROCEDURE — P9016 RBC LEUKOCYTES REDUCED: HCPCS | Performed by: EMERGENCY MEDICINE

## 2021-05-29 PROCEDURE — 96375 TX/PRO/DX INJ NEW DRUG ADDON: CPT

## 2021-05-29 PROCEDURE — 51798 US URINE CAPACITY MEASURE: CPT

## 2021-05-29 PROCEDURE — 83605 ASSAY OF LACTIC ACID: CPT | Performed by: PHYSICIAN ASSISTANT

## 2021-05-29 PROCEDURE — 81003 URINALYSIS AUTO W/O SCOPE: CPT | Performed by: PHYSICIAN ASSISTANT

## 2021-05-29 PROCEDURE — 93010 ELECTROCARDIOGRAM REPORT: CPT | Mod: ,,, | Performed by: INTERNAL MEDICINE

## 2021-05-29 PROCEDURE — 99223 PR INITIAL HOSPITAL CARE,LEVL III: ICD-10-PCS | Mod: AI,GC,, | Performed by: HOSPITALIST

## 2021-05-29 PROCEDURE — U0002 COVID-19 LAB TEST NON-CDC: HCPCS | Performed by: PHYSICIAN ASSISTANT

## 2021-05-29 PROCEDURE — 96374 THER/PROPH/DIAG INJ IV PUSH: CPT

## 2021-05-29 PROCEDURE — 99291 CRITICAL CARE FIRST HOUR: CPT | Mod: CR,CS,, | Performed by: EMERGENCY MEDICINE

## 2021-05-29 PROCEDURE — 63600175 PHARM REV CODE 636 W HCPCS: Performed by: PHYSICIAN ASSISTANT

## 2021-05-29 RX ORDER — PANTOPRAZOLE SODIUM 40 MG/10ML
40 INJECTION, POWDER, LYOPHILIZED, FOR SOLUTION INTRAVENOUS 2 TIMES DAILY
Status: DISCONTINUED | OUTPATIENT
Start: 2021-05-30 | End: 2021-05-31

## 2021-05-29 RX ORDER — CHOLECALCIFEROL (VITAMIN D3) 25 MCG
1000 TABLET ORAL DAILY
Status: DISCONTINUED | OUTPATIENT
Start: 2021-05-30 | End: 2021-06-04 | Stop reason: HOSPADM

## 2021-05-29 RX ORDER — LATANOPROST 50 UG/ML
1 SOLUTION/ DROPS OPHTHALMIC NIGHTLY
Status: DISCONTINUED | OUTPATIENT
Start: 2021-05-29 | End: 2021-06-04 | Stop reason: HOSPADM

## 2021-05-29 RX ORDER — AMLODIPINE BESYLATE 5 MG/1
5 TABLET ORAL 2 TIMES DAILY
Status: DISCONTINUED | OUTPATIENT
Start: 2021-05-29 | End: 2021-06-04 | Stop reason: HOSPADM

## 2021-05-29 RX ORDER — LIDOCAINE 50 MG/G
1 PATCH TOPICAL
Status: DISCONTINUED | OUTPATIENT
Start: 2021-05-29 | End: 2021-05-31

## 2021-05-29 RX ORDER — PANTOPRAZOLE SODIUM 40 MG/10ML
80 INJECTION, POWDER, LYOPHILIZED, FOR SOLUTION INTRAVENOUS
Status: COMPLETED | OUTPATIENT
Start: 2021-05-29 | End: 2021-05-29

## 2021-05-29 RX ORDER — MORPHINE SULFATE 2 MG/ML
2 INJECTION, SOLUTION INTRAMUSCULAR; INTRAVENOUS
Status: COMPLETED | OUTPATIENT
Start: 2021-05-29 | End: 2021-05-29

## 2021-05-29 RX ORDER — ACETAMINOPHEN 325 MG/1
650 TABLET ORAL EVERY 6 HOURS PRN
Status: DISCONTINUED | OUTPATIENT
Start: 2021-05-29 | End: 2021-05-29

## 2021-05-29 RX ORDER — TALC
6 POWDER (GRAM) TOPICAL NIGHTLY PRN
Status: DISCONTINUED | OUTPATIENT
Start: 2021-05-29 | End: 2021-06-04 | Stop reason: HOSPADM

## 2021-05-29 RX ORDER — ACETAMINOPHEN 500 MG
1000 TABLET ORAL EVERY 6 HOURS PRN
Status: DISCONTINUED | OUTPATIENT
Start: 2021-05-29 | End: 2021-05-31

## 2021-05-29 RX ORDER — DORZOLAMIDE HCL 20 MG/ML
1 SOLUTION/ DROPS OPHTHALMIC 2 TIMES DAILY
Status: DISCONTINUED | OUTPATIENT
Start: 2021-05-29 | End: 2021-06-04 | Stop reason: HOSPADM

## 2021-05-29 RX ORDER — ONDANSETRON 2 MG/ML
4 INJECTION INTRAMUSCULAR; INTRAVENOUS EVERY 8 HOURS PRN
Status: DISCONTINUED | OUTPATIENT
Start: 2021-05-29 | End: 2021-06-04 | Stop reason: HOSPADM

## 2021-05-29 RX ORDER — ALPRAZOLAM 0.25 MG/1
0.25 TABLET ORAL 2 TIMES DAILY PRN
Status: DISCONTINUED | OUTPATIENT
Start: 2021-05-29 | End: 2021-05-31

## 2021-05-29 RX ORDER — SODIUM CHLORIDE 0.9 % (FLUSH) 0.9 %
10 SYRINGE (ML) INJECTION
Status: DISCONTINUED | OUTPATIENT
Start: 2021-05-29 | End: 2021-06-04 | Stop reason: HOSPADM

## 2021-05-29 RX ORDER — HYDROCODONE BITARTRATE AND ACETAMINOPHEN 500; 5 MG/1; MG/1
TABLET ORAL
Status: DISCONTINUED | OUTPATIENT
Start: 2021-05-29 | End: 2021-06-01

## 2021-05-29 RX ORDER — LEVOTHYROXINE SODIUM 50 UG/1
50 TABLET ORAL
Status: DISCONTINUED | OUTPATIENT
Start: 2021-05-30 | End: 2021-06-04 | Stop reason: HOSPADM

## 2021-05-29 RX ADMIN — LATANOPROST 1 DROP: 50 SOLUTION OPHTHALMIC at 09:05

## 2021-05-29 RX ADMIN — AMLODIPINE BESYLATE 5 MG: 5 TABLET ORAL at 09:05

## 2021-05-29 RX ADMIN — IOHEXOL 75 ML: 350 INJECTION, SOLUTION INTRAVENOUS at 03:05

## 2021-05-29 RX ADMIN — PANTOPRAZOLE SODIUM 80 MG: 40 INJECTION, POWDER, FOR SOLUTION INTRAVENOUS at 12:05

## 2021-05-29 RX ADMIN — ACETAMINOPHEN 1000 MG: 325 TABLET ORAL at 09:05

## 2021-05-29 RX ADMIN — MORPHINE SULFATE 2 MG: 2 INJECTION, SOLUTION INTRAMUSCULAR; INTRAVENOUS at 12:05

## 2021-05-29 RX ADMIN — ALPRAZOLAM 0.25 MG: 0.25 TABLET ORAL at 09:05

## 2021-05-29 RX ADMIN — MELATONIN TAB 3 MG 6 MG: 3 TAB at 09:05

## 2021-05-30 LAB
ALBUMIN SERPL BCP-MCNC: 3.4 G/DL (ref 3.5–5.2)
ALP SERPL-CCNC: 53 U/L (ref 55–135)
ALT SERPL W/O P-5'-P-CCNC: 10 U/L (ref 10–44)
ANION GAP SERPL CALC-SCNC: 9 MMOL/L (ref 8–16)
AST SERPL-CCNC: 16 U/L (ref 10–40)
BASOPHILS # BLD AUTO: 0.03 K/UL (ref 0–0.2)
BASOPHILS NFR BLD: 0.3 % (ref 0–1.9)
BILIRUB SERPL-MCNC: 0.9 MG/DL (ref 0.1–1)
BUN SERPL-MCNC: 35 MG/DL (ref 10–30)
CALCIUM SERPL-MCNC: 9.1 MG/DL (ref 8.7–10.5)
CHLORIDE SERPL-SCNC: 99 MMOL/L (ref 95–110)
CO2 SERPL-SCNC: 21 MMOL/L (ref 23–29)
CREAT SERPL-MCNC: 1 MG/DL (ref 0.5–1.4)
DIFFERENTIAL METHOD: ABNORMAL
EOSINOPHIL # BLD AUTO: 0 K/UL (ref 0–0.5)
EOSINOPHIL NFR BLD: 0.3 % (ref 0–8)
ERYTHROCYTE [DISTWIDTH] IN BLOOD BY AUTOMATED COUNT: 14.4 % (ref 11.5–14.5)
EST. GFR  (AFRICAN AMERICAN): 54.6 ML/MIN/1.73 M^2
EST. GFR  (NON AFRICAN AMERICAN): 47.3 ML/MIN/1.73 M^2
GLUCOSE SERPL-MCNC: 88 MG/DL (ref 70–110)
HCT VFR BLD AUTO: 42 % (ref 37–48.5)
HGB BLD-MCNC: 14.2 G/DL (ref 12–16)
IMM GRANULOCYTES # BLD AUTO: 0.04 K/UL (ref 0–0.04)
IMM GRANULOCYTES NFR BLD AUTO: 0.4 % (ref 0–0.5)
INR PPP: 0.9 (ref 0.8–1.2)
LYMPHOCYTES # BLD AUTO: 2 K/UL (ref 1–4.8)
LYMPHOCYTES NFR BLD: 21.4 % (ref 18–48)
MAGNESIUM SERPL-MCNC: 1.8 MG/DL (ref 1.6–2.6)
MCH RBC QN AUTO: 30.7 PG (ref 27–31)
MCHC RBC AUTO-ENTMCNC: 33.8 G/DL (ref 32–36)
MCV RBC AUTO: 91 FL (ref 82–98)
MONOCYTES # BLD AUTO: 0.7 K/UL (ref 0.3–1)
MONOCYTES NFR BLD: 7.6 % (ref 4–15)
NEUTROPHILS # BLD AUTO: 6.4 K/UL (ref 1.8–7.7)
NEUTROPHILS NFR BLD: 70 % (ref 38–73)
NRBC BLD-RTO: 0 /100 WBC
PHOSPHATE SERPL-MCNC: 2.8 MG/DL (ref 2.7–4.5)
PLATELET # BLD AUTO: 147 K/UL (ref 150–450)
PMV BLD AUTO: 10 FL (ref 9.2–12.9)
POTASSIUM SERPL-SCNC: 4.5 MMOL/L (ref 3.5–5.1)
PROT SERPL-MCNC: 6.9 G/DL (ref 6–8.4)
PROTHROMBIN TIME: 10.4 SEC (ref 9–12.5)
RBC # BLD AUTO: 4.63 M/UL (ref 4–5.4)
SODIUM SERPL-SCNC: 129 MMOL/L (ref 136–145)
WBC # BLD AUTO: 9.11 K/UL (ref 3.9–12.7)

## 2021-05-30 PROCEDURE — 85610 PROTHROMBIN TIME: CPT | Performed by: STUDENT IN AN ORGANIZED HEALTH CARE EDUCATION/TRAINING PROGRAM

## 2021-05-30 PROCEDURE — 84100 ASSAY OF PHOSPHORUS: CPT | Performed by: STUDENT IN AN ORGANIZED HEALTH CARE EDUCATION/TRAINING PROGRAM

## 2021-05-30 PROCEDURE — 63600175 PHARM REV CODE 636 W HCPCS: Performed by: STUDENT IN AN ORGANIZED HEALTH CARE EDUCATION/TRAINING PROGRAM

## 2021-05-30 PROCEDURE — 99232 PR SUBSEQUENT HOSPITAL CARE,LEVL II: ICD-10-PCS | Mod: GC,,, | Performed by: HOSPITALIST

## 2021-05-30 PROCEDURE — 25000003 PHARM REV CODE 250: Performed by: STUDENT IN AN ORGANIZED HEALTH CARE EDUCATION/TRAINING PROGRAM

## 2021-05-30 PROCEDURE — 99222 PR INITIAL HOSPITAL CARE,LEVL II: ICD-10-PCS | Mod: GC,,, | Performed by: INTERNAL MEDICINE

## 2021-05-30 PROCEDURE — 20600001 HC STEP DOWN PRIVATE ROOM

## 2021-05-30 PROCEDURE — 80053 COMPREHEN METABOLIC PANEL: CPT | Performed by: STUDENT IN AN ORGANIZED HEALTH CARE EDUCATION/TRAINING PROGRAM

## 2021-05-30 PROCEDURE — 99232 SBSQ HOSP IP/OBS MODERATE 35: CPT | Mod: GC,,, | Performed by: HOSPITALIST

## 2021-05-30 PROCEDURE — 36415 COLL VENOUS BLD VENIPUNCTURE: CPT | Performed by: STUDENT IN AN ORGANIZED HEALTH CARE EDUCATION/TRAINING PROGRAM

## 2021-05-30 PROCEDURE — 83735 ASSAY OF MAGNESIUM: CPT | Performed by: STUDENT IN AN ORGANIZED HEALTH CARE EDUCATION/TRAINING PROGRAM

## 2021-05-30 PROCEDURE — C9113 INJ PANTOPRAZOLE SODIUM, VIA: HCPCS | Performed by: STUDENT IN AN ORGANIZED HEALTH CARE EDUCATION/TRAINING PROGRAM

## 2021-05-30 PROCEDURE — 36415 COLL VENOUS BLD VENIPUNCTURE: CPT | Performed by: HOSPITALIST

## 2021-05-30 PROCEDURE — 99222 1ST HOSP IP/OBS MODERATE 55: CPT | Mod: GC,,, | Performed by: INTERNAL MEDICINE

## 2021-05-30 PROCEDURE — 85025 COMPLETE CBC W/AUTO DIFF WBC: CPT | Performed by: HOSPITALIST

## 2021-05-30 RX ADMIN — MELATONIN TAB 3 MG 6 MG: 3 TAB at 08:05

## 2021-05-30 RX ADMIN — PANTOPRAZOLE SODIUM 40 MG: 40 INJECTION, POWDER, FOR SOLUTION INTRAVENOUS at 08:05

## 2021-05-30 RX ADMIN — DORZOLAMIDE HYDROCHLORIDE 1 DROP: 20 SOLUTION/ DROPS OPHTHALMIC at 11:05

## 2021-05-30 RX ADMIN — DORZOLAMIDE HYDROCHLORIDE 1 DROP: 20 SOLUTION/ DROPS OPHTHALMIC at 08:05

## 2021-05-30 RX ADMIN — ALPRAZOLAM 0.25 MG: 0.25 TABLET ORAL at 12:05

## 2021-05-30 RX ADMIN — LEVOTHYROXINE SODIUM 50 MCG: 50 TABLET ORAL at 07:05

## 2021-05-30 RX ADMIN — ACETAMINOPHEN 1000 MG: 325 TABLET ORAL at 10:05

## 2021-05-30 RX ADMIN — ALPRAZOLAM 0.25 MG: 0.25 TABLET ORAL at 08:05

## 2021-05-30 RX ADMIN — ACETAMINOPHEN 1000 MG: 325 TABLET ORAL at 02:05

## 2021-05-30 RX ADMIN — AMLODIPINE BESYLATE 5 MG: 5 TABLET ORAL at 08:05

## 2021-05-30 RX ADMIN — LIDOCAINE 1 PATCH: 50 PATCH TOPICAL at 02:05

## 2021-05-30 RX ADMIN — LATANOPROST 1 DROP: 50 SOLUTION OPHTHALMIC at 08:05

## 2021-05-30 RX ADMIN — CHOLECALCIFEROL (VITAMIN D3) 25 MCG (1,000 UNIT) TABLET 1000 UNITS: TABLET at 08:05

## 2021-05-30 RX ADMIN — AMLODIPINE BESYLATE 5 MG: 5 TABLET ORAL at 09:05

## 2021-05-31 ENCOUNTER — ANESTHESIA EVENT (OUTPATIENT)
Dept: ENDOSCOPY | Facility: HOSPITAL | Age: 86
DRG: 378 | End: 2021-05-31
Payer: MEDICARE

## 2021-05-31 ENCOUNTER — ANESTHESIA (OUTPATIENT)
Dept: ENDOSCOPY | Facility: HOSPITAL | Age: 86
DRG: 378 | End: 2021-05-31
Payer: MEDICARE

## 2021-05-31 LAB
ALBUMIN SERPL BCP-MCNC: 3.1 G/DL (ref 3.5–5.2)
ALP SERPL-CCNC: 49 U/L (ref 55–135)
ALT SERPL W/O P-5'-P-CCNC: 9 U/L (ref 10–44)
ANION GAP SERPL CALC-SCNC: 10 MMOL/L (ref 8–16)
AST SERPL-CCNC: 17 U/L (ref 10–40)
BASOPHILS # BLD AUTO: 0.04 K/UL (ref 0–0.2)
BASOPHILS NFR BLD: 0.7 % (ref 0–1.9)
BILIRUB SERPL-MCNC: 0.5 MG/DL (ref 0.1–1)
BUN SERPL-MCNC: 23 MG/DL (ref 10–30)
CALCIUM SERPL-MCNC: 9.1 MG/DL (ref 8.7–10.5)
CHLORIDE SERPL-SCNC: 99 MMOL/L (ref 95–110)
CO2 SERPL-SCNC: 21 MMOL/L (ref 23–29)
CREAT SERPL-MCNC: 1 MG/DL (ref 0.5–1.4)
DIFFERENTIAL METHOD: ABNORMAL
EOSINOPHIL # BLD AUTO: 0.1 K/UL (ref 0–0.5)
EOSINOPHIL NFR BLD: 2 % (ref 0–8)
ERYTHROCYTE [DISTWIDTH] IN BLOOD BY AUTOMATED COUNT: 15.1 % (ref 11.5–14.5)
EST. GFR  (AFRICAN AMERICAN): 54.6 ML/MIN/1.73 M^2
EST. GFR  (NON AFRICAN AMERICAN): 47.3 ML/MIN/1.73 M^2
GLUCOSE SERPL-MCNC: 65 MG/DL (ref 70–110)
HCT VFR BLD AUTO: 37.4 % (ref 37–48.5)
HGB BLD-MCNC: 12.6 G/DL (ref 12–16)
IMM GRANULOCYTES # BLD AUTO: 0.02 K/UL (ref 0–0.04)
IMM GRANULOCYTES NFR BLD AUTO: 0.3 % (ref 0–0.5)
INR PPP: 0.9 (ref 0.8–1.2)
LYMPHOCYTES # BLD AUTO: 1.6 K/UL (ref 1–4.8)
LYMPHOCYTES NFR BLD: 26.3 % (ref 18–48)
MAGNESIUM SERPL-MCNC: 1.7 MG/DL (ref 1.6–2.6)
MCH RBC QN AUTO: 30.6 PG (ref 27–31)
MCHC RBC AUTO-ENTMCNC: 33.7 G/DL (ref 32–36)
MCV RBC AUTO: 91 FL (ref 82–98)
MONOCYTES # BLD AUTO: 0.5 K/UL (ref 0.3–1)
MONOCYTES NFR BLD: 7.6 % (ref 4–15)
NEUTROPHILS # BLD AUTO: 3.8 K/UL (ref 1.8–7.7)
NEUTROPHILS NFR BLD: 63.1 % (ref 38–73)
NRBC BLD-RTO: 0 /100 WBC
PHOSPHATE SERPL-MCNC: 3.2 MG/DL (ref 2.7–4.5)
PLATELET # BLD AUTO: 132 K/UL (ref 150–450)
PMV BLD AUTO: 10.6 FL (ref 9.2–12.9)
POCT GLUCOSE: 212 MG/DL (ref 70–110)
POTASSIUM SERPL-SCNC: 4.3 MMOL/L (ref 3.5–5.1)
PROT SERPL-MCNC: 6.5 G/DL (ref 6–8.4)
PROTHROMBIN TIME: 10.2 SEC (ref 9–12.5)
RBC # BLD AUTO: 4.12 M/UL (ref 4–5.4)
SODIUM SERPL-SCNC: 130 MMOL/L (ref 136–145)
WBC # BLD AUTO: 5.94 K/UL (ref 3.9–12.7)

## 2021-05-31 PROCEDURE — D9220A PRA ANESTHESIA: ICD-10-PCS | Mod: CRNA,,, | Performed by: NURSE ANESTHETIST, CERTIFIED REGISTERED

## 2021-05-31 PROCEDURE — 37000008 HC ANESTHESIA 1ST 15 MINUTES: Performed by: INTERNAL MEDICINE

## 2021-05-31 PROCEDURE — 43235 EGD DIAGNOSTIC BRUSH WASH: CPT | Mod: ,,, | Performed by: INTERNAL MEDICINE

## 2021-05-31 PROCEDURE — 43235 EGD DIAGNOSTIC BRUSH WASH: CPT | Performed by: INTERNAL MEDICINE

## 2021-05-31 PROCEDURE — 97530 THERAPEUTIC ACTIVITIES: CPT

## 2021-05-31 PROCEDURE — 63600175 PHARM REV CODE 636 W HCPCS: Performed by: NURSE ANESTHETIST, CERTIFIED REGISTERED

## 2021-05-31 PROCEDURE — 25000003 PHARM REV CODE 250: Performed by: NURSE ANESTHETIST, CERTIFIED REGISTERED

## 2021-05-31 PROCEDURE — C9113 INJ PANTOPRAZOLE SODIUM, VIA: HCPCS | Performed by: STUDENT IN AN ORGANIZED HEALTH CARE EDUCATION/TRAINING PROGRAM

## 2021-05-31 PROCEDURE — 80053 COMPREHEN METABOLIC PANEL: CPT | Performed by: STUDENT IN AN ORGANIZED HEALTH CARE EDUCATION/TRAINING PROGRAM

## 2021-05-31 PROCEDURE — 85610 PROTHROMBIN TIME: CPT | Performed by: STUDENT IN AN ORGANIZED HEALTH CARE EDUCATION/TRAINING PROGRAM

## 2021-05-31 PROCEDURE — 37000009 HC ANESTHESIA EA ADD 15 MINS: Performed by: INTERNAL MEDICINE

## 2021-05-31 PROCEDURE — 63600175 PHARM REV CODE 636 W HCPCS: Performed by: STUDENT IN AN ORGANIZED HEALTH CARE EDUCATION/TRAINING PROGRAM

## 2021-05-31 PROCEDURE — 43235 PR EGD, FLEX, DIAGNOSTIC: ICD-10-PCS | Mod: ,,, | Performed by: INTERNAL MEDICINE

## 2021-05-31 PROCEDURE — 99232 PR SUBSEQUENT HOSPITAL CARE,LEVL II: ICD-10-PCS | Mod: GC,,, | Performed by: HOSPITALIST

## 2021-05-31 PROCEDURE — D9220A PRA ANESTHESIA: ICD-10-PCS | Mod: ANES,,, | Performed by: SURGERY

## 2021-05-31 PROCEDURE — 99232 SBSQ HOSP IP/OBS MODERATE 35: CPT | Mod: GC,,, | Performed by: HOSPITALIST

## 2021-05-31 PROCEDURE — 36415 COLL VENOUS BLD VENIPUNCTURE: CPT | Performed by: STUDENT IN AN ORGANIZED HEALTH CARE EDUCATION/TRAINING PROGRAM

## 2021-05-31 PROCEDURE — 25000003 PHARM REV CODE 250: Performed by: STUDENT IN AN ORGANIZED HEALTH CARE EDUCATION/TRAINING PROGRAM

## 2021-05-31 PROCEDURE — 84100 ASSAY OF PHOSPHORUS: CPT | Performed by: STUDENT IN AN ORGANIZED HEALTH CARE EDUCATION/TRAINING PROGRAM

## 2021-05-31 PROCEDURE — 83735 ASSAY OF MAGNESIUM: CPT | Performed by: STUDENT IN AN ORGANIZED HEALTH CARE EDUCATION/TRAINING PROGRAM

## 2021-05-31 PROCEDURE — D9220A PRA ANESTHESIA: Mod: ANES,,, | Performed by: SURGERY

## 2021-05-31 PROCEDURE — 97161 PT EVAL LOW COMPLEX 20 MIN: CPT

## 2021-05-31 PROCEDURE — D9220A PRA ANESTHESIA: Mod: CRNA,,, | Performed by: NURSE ANESTHETIST, CERTIFIED REGISTERED

## 2021-05-31 PROCEDURE — 20600001 HC STEP DOWN PRIVATE ROOM

## 2021-05-31 PROCEDURE — 25000003 PHARM REV CODE 250: Performed by: EMERGENCY MEDICINE

## 2021-05-31 PROCEDURE — 97165 OT EVAL LOW COMPLEX 30 MIN: CPT

## 2021-05-31 PROCEDURE — 97535 SELF CARE MNGMENT TRAINING: CPT

## 2021-05-31 PROCEDURE — 85025 COMPLETE CBC W/AUTO DIFF WBC: CPT | Performed by: STUDENT IN AN ORGANIZED HEALTH CARE EDUCATION/TRAINING PROGRAM

## 2021-05-31 RX ORDER — SODIUM CHLORIDE 0.9 % (FLUSH) 0.9 %
10 SYRINGE (ML) INJECTION
Status: DISCONTINUED | OUTPATIENT
Start: 2021-05-31 | End: 2021-05-31 | Stop reason: HOSPADM

## 2021-05-31 RX ORDER — LIDOCAINE HYDROCHLORIDE 20 MG/ML
INJECTION INTRAVENOUS
Status: DISCONTINUED | OUTPATIENT
Start: 2021-05-31 | End: 2021-05-31

## 2021-05-31 RX ORDER — ETOMIDATE 2 MG/ML
INJECTION INTRAVENOUS
Status: DISCONTINUED | OUTPATIENT
Start: 2021-05-31 | End: 2021-05-31

## 2021-05-31 RX ORDER — HYDROCODONE BITARTRATE AND ACETAMINOPHEN 5; 325 MG/1; MG/1
1 TABLET ORAL ONCE
Status: COMPLETED | OUTPATIENT
Start: 2021-05-31 | End: 2021-05-31

## 2021-05-31 RX ORDER — ACETAMINOPHEN 325 MG/1
650 TABLET ORAL EVERY 6 HOURS
Status: DISCONTINUED | OUTPATIENT
Start: 2021-05-31 | End: 2021-06-04

## 2021-05-31 RX ORDER — PROPOFOL 10 MG/ML
VIAL (ML) INTRAVENOUS
Status: DISCONTINUED | OUTPATIENT
Start: 2021-05-31 | End: 2021-05-31

## 2021-05-31 RX ORDER — ALPRAZOLAM 0.25 MG/1
0.25 TABLET ORAL
Status: DISCONTINUED | OUTPATIENT
Start: 2021-05-31 | End: 2021-06-04

## 2021-05-31 RX ORDER — IBUPROFEN 200 MG
16 TABLET ORAL
Status: DISCONTINUED | OUTPATIENT
Start: 2021-05-31 | End: 2021-05-31

## 2021-05-31 RX ORDER — PANTOPRAZOLE SODIUM 40 MG/1
40 TABLET, DELAYED RELEASE ORAL DAILY
Status: DISCONTINUED | OUTPATIENT
Start: 2021-06-01 | End: 2021-06-04 | Stop reason: HOSPADM

## 2021-05-31 RX ORDER — IBUPROFEN 200 MG
16 TABLET ORAL ONCE
Status: COMPLETED | OUTPATIENT
Start: 2021-05-31 | End: 2021-05-31

## 2021-05-31 RX ORDER — LIDOCAINE 50 MG/G
1 PATCH TOPICAL
Status: DISCONTINUED | OUTPATIENT
Start: 2021-05-31 | End: 2021-06-04 | Stop reason: HOSPADM

## 2021-05-31 RX ORDER — GABAPENTIN 250 MG/5ML
50 SOLUTION ORAL NIGHTLY
Status: DISCONTINUED | OUTPATIENT
Start: 2021-05-31 | End: 2021-06-03

## 2021-05-31 RX ADMIN — ETOMIDATE 4 MG: 2 INJECTION, SOLUTION INTRAVENOUS at 12:05

## 2021-05-31 RX ADMIN — LIDOCAINE HYDROCHLORIDE 30 MG: 20 INJECTION, SOLUTION INTRAVENOUS at 12:05

## 2021-05-31 RX ADMIN — ACETAMINOPHEN 650 MG: 325 TABLET ORAL at 02:05

## 2021-05-31 RX ADMIN — ALPRAZOLAM 0.25 MG: 0.25 TABLET ORAL at 05:05

## 2021-05-31 RX ADMIN — AMLODIPINE BESYLATE 5 MG: 5 TABLET ORAL at 10:05

## 2021-05-31 RX ADMIN — AMLODIPINE BESYLATE 5 MG: 5 TABLET ORAL at 08:05

## 2021-05-31 RX ADMIN — LEVOTHYROXINE SODIUM 50 MCG: 50 TABLET ORAL at 07:05

## 2021-05-31 RX ADMIN — Medication 16 G: at 08:05

## 2021-05-31 RX ADMIN — DORZOLAMIDE HYDROCHLORIDE 1 DROP: 20 SOLUTION/ DROPS OPHTHALMIC at 08:05

## 2021-05-31 RX ADMIN — HYDROCODONE BITARTRATE AND ACETAMINOPHEN 1 TABLET: 5; 325 TABLET ORAL at 04:05

## 2021-05-31 RX ADMIN — LATANOPROST 1 DROP: 50 SOLUTION OPHTHALMIC at 10:05

## 2021-05-31 RX ADMIN — DORZOLAMIDE HYDROCHLORIDE 1 DROP: 20 SOLUTION/ DROPS OPHTHALMIC at 10:05

## 2021-05-31 RX ADMIN — CHOLECALCIFEROL (VITAMIN D3) 25 MCG (1,000 UNIT) TABLET 1000 UNITS: TABLET at 08:05

## 2021-05-31 RX ADMIN — DEXTROSE MONOHYDRATE 25 G: 25 INJECTION, SOLUTION INTRAVENOUS at 08:05

## 2021-05-31 RX ADMIN — LIDOCAINE 1 PATCH: 50 PATCH TOPICAL at 03:05

## 2021-05-31 RX ADMIN — SODIUM CHLORIDE 100 ML: 0.9 INJECTION, SOLUTION INTRAVENOUS at 11:05

## 2021-05-31 RX ADMIN — PANTOPRAZOLE SODIUM 40 MG: 40 INJECTION, POWDER, FOR SOLUTION INTRAVENOUS at 08:05

## 2021-05-31 RX ADMIN — PROPOFOL 10 MG: 10 INJECTION, EMULSION INTRAVENOUS at 12:05

## 2021-05-31 RX ADMIN — ETOMIDATE 2 MG: 2 INJECTION, SOLUTION INTRAVENOUS at 12:05

## 2021-06-01 PROBLEM — K92.0 HEMATEMESIS: Status: RESOLVED | Noted: 2021-05-29 | Resolved: 2021-06-01

## 2021-06-01 LAB
ALBUMIN SERPL BCP-MCNC: 3.1 G/DL (ref 3.5–5.2)
ALP SERPL-CCNC: 55 U/L (ref 55–135)
ALT SERPL W/O P-5'-P-CCNC: 8 U/L (ref 10–44)
ANION GAP SERPL CALC-SCNC: 10 MMOL/L (ref 8–16)
AST SERPL-CCNC: 17 U/L (ref 10–40)
BASOPHILS # BLD AUTO: 0.04 K/UL (ref 0–0.2)
BASOPHILS NFR BLD: 0.6 % (ref 0–1.9)
BILIRUB SERPL-MCNC: 0.4 MG/DL (ref 0.1–1)
BUN SERPL-MCNC: 20 MG/DL (ref 10–30)
CALCIUM SERPL-MCNC: 9 MG/DL (ref 8.7–10.5)
CHLORIDE SERPL-SCNC: 98 MMOL/L (ref 95–110)
CO2 SERPL-SCNC: 22 MMOL/L (ref 23–29)
CREAT SERPL-MCNC: 1 MG/DL (ref 0.5–1.4)
DIFFERENTIAL METHOD: NORMAL
EOSINOPHIL # BLD AUTO: 0.1 K/UL (ref 0–0.5)
EOSINOPHIL NFR BLD: 1 % (ref 0–8)
ERYTHROCYTE [DISTWIDTH] IN BLOOD BY AUTOMATED COUNT: 14.3 % (ref 11.5–14.5)
EST. GFR  (AFRICAN AMERICAN): 54.6 ML/MIN/1.73 M^2
EST. GFR  (NON AFRICAN AMERICAN): 47.3 ML/MIN/1.73 M^2
GLUCOSE SERPL-MCNC: 92 MG/DL (ref 70–110)
HCT VFR BLD AUTO: 38.1 % (ref 37–48.5)
HGB BLD-MCNC: 13 G/DL (ref 12–16)
IMM GRANULOCYTES # BLD AUTO: 0.03 K/UL (ref 0–0.04)
IMM GRANULOCYTES NFR BLD AUTO: 0.4 % (ref 0–0.5)
LYMPHOCYTES # BLD AUTO: 1.5 K/UL (ref 1–4.8)
LYMPHOCYTES NFR BLD: 21.7 % (ref 18–48)
MAGNESIUM SERPL-MCNC: 1.7 MG/DL (ref 1.6–2.6)
MCH RBC QN AUTO: 30.8 PG (ref 27–31)
MCHC RBC AUTO-ENTMCNC: 34.1 G/DL (ref 32–36)
MCV RBC AUTO: 90 FL (ref 82–98)
MONOCYTES # BLD AUTO: 0.5 K/UL (ref 0.3–1)
MONOCYTES NFR BLD: 7.5 % (ref 4–15)
NEUTROPHILS # BLD AUTO: 4.8 K/UL (ref 1.8–7.7)
NEUTROPHILS NFR BLD: 68.8 % (ref 38–73)
NRBC BLD-RTO: 0 /100 WBC
PLATELET # BLD AUTO: 159 K/UL (ref 150–450)
PMV BLD AUTO: 10.9 FL (ref 9.2–12.9)
POTASSIUM SERPL-SCNC: 3.8 MMOL/L (ref 3.5–5.1)
PROT SERPL-MCNC: 6.5 G/DL (ref 6–8.4)
RBC # BLD AUTO: 4.22 M/UL (ref 4–5.4)
SARS-COV-2 RNA RESP QL NAA+PROBE: NOT DETECTED
SODIUM SERPL-SCNC: 130 MMOL/L (ref 136–145)
WBC # BLD AUTO: 6.97 K/UL (ref 3.9–12.7)

## 2021-06-01 PROCEDURE — 20600001 HC STEP DOWN PRIVATE ROOM

## 2021-06-01 PROCEDURE — 85025 COMPLETE CBC W/AUTO DIFF WBC: CPT | Performed by: STUDENT IN AN ORGANIZED HEALTH CARE EDUCATION/TRAINING PROGRAM

## 2021-06-01 PROCEDURE — 99231 SBSQ HOSP IP/OBS SF/LOW 25: CPT | Mod: GC,,, | Performed by: HOSPITALIST

## 2021-06-01 PROCEDURE — 99231 PR SUBSEQUENT HOSPITAL CARE,LEVL I: ICD-10-PCS | Mod: GC,,, | Performed by: HOSPITALIST

## 2021-06-01 PROCEDURE — 80053 COMPREHEN METABOLIC PANEL: CPT | Performed by: STUDENT IN AN ORGANIZED HEALTH CARE EDUCATION/TRAINING PROGRAM

## 2021-06-01 PROCEDURE — 83735 ASSAY OF MAGNESIUM: CPT | Performed by: STUDENT IN AN ORGANIZED HEALTH CARE EDUCATION/TRAINING PROGRAM

## 2021-06-01 PROCEDURE — 36415 COLL VENOUS BLD VENIPUNCTURE: CPT | Performed by: STUDENT IN AN ORGANIZED HEALTH CARE EDUCATION/TRAINING PROGRAM

## 2021-06-01 PROCEDURE — 25000003 PHARM REV CODE 250: Performed by: STUDENT IN AN ORGANIZED HEALTH CARE EDUCATION/TRAINING PROGRAM

## 2021-06-01 PROCEDURE — U0005 INFEC AGEN DETEC AMPLI PROBE: HCPCS | Performed by: STUDENT IN AN ORGANIZED HEALTH CARE EDUCATION/TRAINING PROGRAM

## 2021-06-01 PROCEDURE — U0003 INFECTIOUS AGENT DETECTION BY NUCLEIC ACID (DNA OR RNA); SEVERE ACUTE RESPIRATORY SYNDROME CORONAVIRUS 2 (SARS-COV-2) (CORONAVIRUS DISEASE [COVID-19]), AMPLIFIED PROBE TECHNIQUE, MAKING USE OF HIGH THROUGHPUT TECHNOLOGIES AS DESCRIBED BY CMS-2020-01-R: HCPCS | Performed by: STUDENT IN AN ORGANIZED HEALTH CARE EDUCATION/TRAINING PROGRAM

## 2021-06-01 RX ADMIN — AMLODIPINE BESYLATE 5 MG: 5 TABLET ORAL at 09:06

## 2021-06-01 RX ADMIN — PANTOPRAZOLE SODIUM 40 MG: 40 TABLET, DELAYED RELEASE ORAL at 09:06

## 2021-06-01 RX ADMIN — LIDOCAINE 1 PATCH: 50 PATCH TOPICAL at 05:06

## 2021-06-01 RX ADMIN — ALPRAZOLAM 0.25 MG: 0.25 TABLET ORAL at 10:06

## 2021-06-01 RX ADMIN — DORZOLAMIDE HYDROCHLORIDE 1 DROP: 20 SOLUTION/ DROPS OPHTHALMIC at 10:06

## 2021-06-01 RX ADMIN — GABAPENTIN 50 MG: 250 SOLUTION ORAL at 09:06

## 2021-06-01 RX ADMIN — LEVOTHYROXINE SODIUM 50 MCG: 50 TABLET ORAL at 07:06

## 2021-06-01 RX ADMIN — AMLODIPINE BESYLATE 5 MG: 5 TABLET ORAL at 10:06

## 2021-06-01 RX ADMIN — LATANOPROST 1 DROP: 50 SOLUTION OPHTHALMIC at 10:06

## 2021-06-01 RX ADMIN — CHOLECALCIFEROL (VITAMIN D3) 25 MCG (1,000 UNIT) TABLET 1000 UNITS: TABLET at 09:06

## 2021-06-02 LAB
ALBUMIN SERPL BCP-MCNC: 3 G/DL (ref 3.5–5.2)
ALP SERPL-CCNC: 61 U/L (ref 55–135)
ALT SERPL W/O P-5'-P-CCNC: 8 U/L (ref 10–44)
ANION GAP SERPL CALC-SCNC: 12 MMOL/L (ref 8–16)
AST SERPL-CCNC: 17 U/L (ref 10–40)
BASOPHILS # BLD AUTO: 0.03 K/UL (ref 0–0.2)
BASOPHILS NFR BLD: 0.3 % (ref 0–1.9)
BILIRUB SERPL-MCNC: 0.3 MG/DL (ref 0.1–1)
BUN SERPL-MCNC: 22 MG/DL (ref 10–30)
CALCIUM SERPL-MCNC: 9.3 MG/DL (ref 8.7–10.5)
CHLORIDE SERPL-SCNC: 100 MMOL/L (ref 95–110)
CO2 SERPL-SCNC: 20 MMOL/L (ref 23–29)
CREAT SERPL-MCNC: 1.2 MG/DL (ref 0.5–1.4)
DIFFERENTIAL METHOD: ABNORMAL
EOSINOPHIL # BLD AUTO: 0.1 K/UL (ref 0–0.5)
EOSINOPHIL NFR BLD: 0.8 % (ref 0–8)
ERYTHROCYTE [DISTWIDTH] IN BLOOD BY AUTOMATED COUNT: 14.4 % (ref 11.5–14.5)
EST. GFR  (AFRICAN AMERICAN): 43.8 ML/MIN/1.73 M^2
EST. GFR  (NON AFRICAN AMERICAN): 38 ML/MIN/1.73 M^2
GLUCOSE SERPL-MCNC: 91 MG/DL (ref 70–110)
HCT VFR BLD AUTO: 37.2 % (ref 37–48.5)
HGB BLD-MCNC: 12.5 G/DL (ref 12–16)
IMM GRANULOCYTES # BLD AUTO: 0.04 K/UL (ref 0–0.04)
IMM GRANULOCYTES NFR BLD AUTO: 0.5 % (ref 0–0.5)
LYMPHOCYTES # BLD AUTO: 1.5 K/UL (ref 1–4.8)
LYMPHOCYTES NFR BLD: 17.3 % (ref 18–48)
MAGNESIUM SERPL-MCNC: 2.1 MG/DL (ref 1.6–2.6)
MCH RBC QN AUTO: 30.8 PG (ref 27–31)
MCHC RBC AUTO-ENTMCNC: 33.6 G/DL (ref 32–36)
MCV RBC AUTO: 92 FL (ref 82–98)
MONOCYTES # BLD AUTO: 0.7 K/UL (ref 0.3–1)
MONOCYTES NFR BLD: 7.7 % (ref 4–15)
NEUTROPHILS # BLD AUTO: 6.5 K/UL (ref 1.8–7.7)
NEUTROPHILS NFR BLD: 73.4 % (ref 38–73)
NRBC BLD-RTO: 0 /100 WBC
PLATELET # BLD AUTO: 177 K/UL (ref 150–450)
PMV BLD AUTO: 10.4 FL (ref 9.2–12.9)
POTASSIUM SERPL-SCNC: 3.6 MMOL/L (ref 3.5–5.1)
PROT SERPL-MCNC: 6.5 G/DL (ref 6–8.4)
RBC # BLD AUTO: 4.06 M/UL (ref 4–5.4)
SODIUM SERPL-SCNC: 132 MMOL/L (ref 136–145)
WBC # BLD AUTO: 8.79 K/UL (ref 3.9–12.7)

## 2021-06-02 PROCEDURE — 97116 GAIT TRAINING THERAPY: CPT

## 2021-06-02 PROCEDURE — 97530 THERAPEUTIC ACTIVITIES: CPT

## 2021-06-02 PROCEDURE — 80053 COMPREHEN METABOLIC PANEL: CPT | Performed by: STUDENT IN AN ORGANIZED HEALTH CARE EDUCATION/TRAINING PROGRAM

## 2021-06-02 PROCEDURE — 30200315 PPD INTRADERMAL TEST REV CODE 302: Performed by: HOSPITALIST

## 2021-06-02 PROCEDURE — 86580 TB INTRADERMAL TEST: CPT | Performed by: HOSPITALIST

## 2021-06-02 PROCEDURE — 99232 SBSQ HOSP IP/OBS MODERATE 35: CPT | Mod: GC,,, | Performed by: HOSPITALIST

## 2021-06-02 PROCEDURE — 20600001 HC STEP DOWN PRIVATE ROOM

## 2021-06-02 PROCEDURE — 25000003 PHARM REV CODE 250: Performed by: HOSPITALIST

## 2021-06-02 PROCEDURE — 25000003 PHARM REV CODE 250: Performed by: STUDENT IN AN ORGANIZED HEALTH CARE EDUCATION/TRAINING PROGRAM

## 2021-06-02 PROCEDURE — 36415 COLL VENOUS BLD VENIPUNCTURE: CPT | Performed by: STUDENT IN AN ORGANIZED HEALTH CARE EDUCATION/TRAINING PROGRAM

## 2021-06-02 PROCEDURE — 83735 ASSAY OF MAGNESIUM: CPT | Performed by: STUDENT IN AN ORGANIZED HEALTH CARE EDUCATION/TRAINING PROGRAM

## 2021-06-02 PROCEDURE — 99232 PR SUBSEQUENT HOSPITAL CARE,LEVL II: ICD-10-PCS | Mod: GC,,, | Performed by: HOSPITALIST

## 2021-06-02 PROCEDURE — 85025 COMPLETE CBC W/AUTO DIFF WBC: CPT | Performed by: STUDENT IN AN ORGANIZED HEALTH CARE EDUCATION/TRAINING PROGRAM

## 2021-06-02 PROCEDURE — 97535 SELF CARE MNGMENT TRAINING: CPT

## 2021-06-02 RX ORDER — EZETIMIBE 10 MG/1
10 TABLET ORAL NIGHTLY
Status: DISCONTINUED | OUTPATIENT
Start: 2021-06-02 | End: 2021-06-04 | Stop reason: HOSPADM

## 2021-06-02 RX ORDER — TRAMADOL HYDROCHLORIDE 50 MG/1
50 TABLET ORAL ONCE AS NEEDED
Status: COMPLETED | OUTPATIENT
Start: 2021-06-02 | End: 2021-06-02

## 2021-06-02 RX ORDER — CLOPIDOGREL BISULFATE 75 MG/1
75 TABLET ORAL
Status: DISCONTINUED | OUTPATIENT
Start: 2021-06-03 | End: 2021-06-04 | Stop reason: HOSPADM

## 2021-06-02 RX ADMIN — TRAMADOL HYDROCHLORIDE 50 MG: 50 TABLET, FILM COATED ORAL at 06:06

## 2021-06-02 RX ADMIN — CHOLECALCIFEROL (VITAMIN D3) 25 MCG (1,000 UNIT) TABLET 1000 UNITS: TABLET at 09:06

## 2021-06-02 RX ADMIN — LATANOPROST 1 DROP: 50 SOLUTION OPHTHALMIC at 10:06

## 2021-06-02 RX ADMIN — DORZOLAMIDE HYDROCHLORIDE 1 DROP: 20 SOLUTION/ DROPS OPHTHALMIC at 09:06

## 2021-06-02 RX ADMIN — EZETIMIBE 10 MG: 10 TABLET ORAL at 10:06

## 2021-06-02 RX ADMIN — LEVOTHYROXINE SODIUM 50 MCG: 50 TABLET ORAL at 08:06

## 2021-06-02 RX ADMIN — AMLODIPINE BESYLATE 5 MG: 5 TABLET ORAL at 10:06

## 2021-06-02 RX ADMIN — TUBERCULIN PURIFIED PROTEIN DERIVATIVE 5 UNITS: 5 INJECTION, SOLUTION INTRADERMAL at 11:06

## 2021-06-02 RX ADMIN — GABAPENTIN 50 MG: 250 SOLUTION ORAL at 11:06

## 2021-06-02 RX ADMIN — ACETAMINOPHEN 650 MG: 325 TABLET ORAL at 10:06

## 2021-06-02 RX ADMIN — ACETAMINOPHEN 650 MG: 325 TABLET ORAL at 06:06

## 2021-06-02 RX ADMIN — PANTOPRAZOLE SODIUM 40 MG: 40 TABLET, DELAYED RELEASE ORAL at 09:06

## 2021-06-02 RX ADMIN — LIDOCAINE 1 PATCH: 50 PATCH TOPICAL at 04:06

## 2021-06-02 RX ADMIN — AMLODIPINE BESYLATE 5 MG: 5 TABLET ORAL at 09:06

## 2021-06-02 RX ADMIN — ALPRAZOLAM 0.25 MG: 0.25 TABLET ORAL at 10:06

## 2021-06-02 RX ADMIN — DORZOLAMIDE HYDROCHLORIDE 1 DROP: 20 SOLUTION/ DROPS OPHTHALMIC at 10:06

## 2021-06-02 RX ADMIN — ACETAMINOPHEN 650 MG: 325 TABLET ORAL at 02:06

## 2021-06-03 LAB
ALBUMIN SERPL BCP-MCNC: 3.5 G/DL (ref 3.5–5.2)
ALP SERPL-CCNC: 69 U/L (ref 55–135)
ALT SERPL W/O P-5'-P-CCNC: 11 U/L (ref 10–44)
ANION GAP SERPL CALC-SCNC: 15 MMOL/L (ref 8–16)
AST SERPL-CCNC: 25 U/L (ref 10–40)
BASOPHILS # BLD AUTO: 0.04 K/UL (ref 0–0.2)
BASOPHILS NFR BLD: 0.6 % (ref 0–1.9)
BILIRUB SERPL-MCNC: 0.4 MG/DL (ref 0.1–1)
BUN SERPL-MCNC: 29 MG/DL (ref 10–30)
CALCIUM SERPL-MCNC: 9.5 MG/DL (ref 8.7–10.5)
CHLORIDE SERPL-SCNC: 98 MMOL/L (ref 95–110)
CO2 SERPL-SCNC: 15 MMOL/L (ref 23–29)
CREAT SERPL-MCNC: 1.5 MG/DL (ref 0.5–1.4)
DIFFERENTIAL METHOD: ABNORMAL
EOSINOPHIL # BLD AUTO: 0.1 K/UL (ref 0–0.5)
EOSINOPHIL NFR BLD: 0.8 % (ref 0–8)
ERYTHROCYTE [DISTWIDTH] IN BLOOD BY AUTOMATED COUNT: 15 % (ref 11.5–14.5)
EST. GFR  (AFRICAN AMERICAN): 33.4 ML/MIN/1.73 M^2
EST. GFR  (NON AFRICAN AMERICAN): 29 ML/MIN/1.73 M^2
GLUCOSE SERPL-MCNC: 88 MG/DL (ref 70–110)
HCT VFR BLD AUTO: 39.4 % (ref 37–48.5)
HGB BLD-MCNC: 12.7 G/DL (ref 12–16)
IMM GRANULOCYTES # BLD AUTO: 0.03 K/UL (ref 0–0.04)
IMM GRANULOCYTES NFR BLD AUTO: 0.5 % (ref 0–0.5)
LYMPHOCYTES # BLD AUTO: 1.8 K/UL (ref 1–4.8)
LYMPHOCYTES NFR BLD: 27.9 % (ref 18–48)
MAGNESIUM SERPL-MCNC: 1.9 MG/DL (ref 1.6–2.6)
MCH RBC QN AUTO: 30.5 PG (ref 27–31)
MCHC RBC AUTO-ENTMCNC: 32.2 G/DL (ref 32–36)
MCV RBC AUTO: 95 FL (ref 82–98)
MONOCYTES # BLD AUTO: 0.5 K/UL (ref 0.3–1)
MONOCYTES NFR BLD: 7.6 % (ref 4–15)
NEUTROPHILS # BLD AUTO: 4.1 K/UL (ref 1.8–7.7)
NEUTROPHILS NFR BLD: 62.6 % (ref 38–73)
NRBC BLD-RTO: 0 /100 WBC
PLATELET # BLD AUTO: 175 K/UL (ref 150–450)
PMV BLD AUTO: 10.8 FL (ref 9.2–12.9)
POTASSIUM SERPL-SCNC: 4.5 MMOL/L (ref 3.5–5.1)
PROT SERPL-MCNC: 7.6 G/DL (ref 6–8.4)
RBC # BLD AUTO: 4.17 M/UL (ref 4–5.4)
SODIUM SERPL-SCNC: 128 MMOL/L (ref 136–145)
WBC # BLD AUTO: 6.55 K/UL (ref 3.9–12.7)

## 2021-06-03 PROCEDURE — 83735 ASSAY OF MAGNESIUM: CPT | Performed by: STUDENT IN AN ORGANIZED HEALTH CARE EDUCATION/TRAINING PROGRAM

## 2021-06-03 PROCEDURE — 27000221 HC OXYGEN, UP TO 24 HOURS

## 2021-06-03 PROCEDURE — 94799 UNLISTED PULMONARY SVC/PX: CPT

## 2021-06-03 PROCEDURE — 25000003 PHARM REV CODE 250: Performed by: STUDENT IN AN ORGANIZED HEALTH CARE EDUCATION/TRAINING PROGRAM

## 2021-06-03 PROCEDURE — 80053 COMPREHEN METABOLIC PANEL: CPT | Performed by: STUDENT IN AN ORGANIZED HEALTH CARE EDUCATION/TRAINING PROGRAM

## 2021-06-03 PROCEDURE — 85025 COMPLETE CBC W/AUTO DIFF WBC: CPT | Performed by: STUDENT IN AN ORGANIZED HEALTH CARE EDUCATION/TRAINING PROGRAM

## 2021-06-03 PROCEDURE — 20600001 HC STEP DOWN PRIVATE ROOM

## 2021-06-03 PROCEDURE — 36415 COLL VENOUS BLD VENIPUNCTURE: CPT | Performed by: STUDENT IN AN ORGANIZED HEALTH CARE EDUCATION/TRAINING PROGRAM

## 2021-06-03 PROCEDURE — 86677 HELICOBACTER PYLORI ANTIBODY: CPT | Performed by: STUDENT IN AN ORGANIZED HEALTH CARE EDUCATION/TRAINING PROGRAM

## 2021-06-03 PROCEDURE — 94761 N-INVAS EAR/PLS OXIMETRY MLT: CPT

## 2021-06-03 PROCEDURE — 99231 PR SUBSEQUENT HOSPITAL CARE,LEVL I: ICD-10-PCS | Mod: GC,,, | Performed by: HOSPITALIST

## 2021-06-03 PROCEDURE — 99231 SBSQ HOSP IP/OBS SF/LOW 25: CPT | Mod: GC,,, | Performed by: HOSPITALIST

## 2021-06-03 RX ORDER — GABAPENTIN 250 MG/5ML
50 SOLUTION ORAL NIGHTLY
Qty: 5 BOTTLE | Refills: 3
Start: 2021-06-03 | End: 2021-08-13

## 2021-06-03 RX ORDER — GABAPENTIN 250 MG/5ML
100 SOLUTION ORAL NIGHTLY
Status: DISCONTINUED | OUTPATIENT
Start: 2021-06-03 | End: 2021-06-04

## 2021-06-03 RX ORDER — PANTOPRAZOLE SODIUM 40 MG/1
40 TABLET, DELAYED RELEASE ORAL DAILY
Qty: 30 TABLET | Refills: 2
Start: 2021-06-04 | End: 2021-06-13 | Stop reason: SDUPTHER

## 2021-06-03 RX ORDER — ALPRAZOLAM 0.25 MG/1
0.25 TABLET ORAL DAILY PRN
Qty: 12 TABLET | Refills: 0 | Status: SHIPPED | OUTPATIENT
Start: 2021-06-03 | End: 2021-06-03 | Stop reason: SDUPTHER

## 2021-06-03 RX ORDER — ALPRAZOLAM 0.25 MG/1
0.25 TABLET ORAL DAILY PRN
Qty: 12 TABLET | Refills: 0 | Status: SHIPPED | OUTPATIENT
Start: 2021-06-03 | End: 2021-09-17 | Stop reason: SDUPTHER

## 2021-06-03 RX ADMIN — DORZOLAMIDE HYDROCHLORIDE 1 DROP: 20 SOLUTION/ DROPS OPHTHALMIC at 08:06

## 2021-06-03 RX ADMIN — PANTOPRAZOLE SODIUM 40 MG: 40 TABLET, DELAYED RELEASE ORAL at 08:06

## 2021-06-03 RX ADMIN — AMLODIPINE BESYLATE 5 MG: 5 TABLET ORAL at 08:06

## 2021-06-03 RX ADMIN — LIDOCAINE 1 PATCH: 50 PATCH TOPICAL at 04:06

## 2021-06-03 RX ADMIN — ACETAMINOPHEN 650 MG: 325 TABLET ORAL at 11:06

## 2021-06-03 RX ADMIN — ACETAMINOPHEN 650 MG: 325 TABLET ORAL at 06:06

## 2021-06-03 RX ADMIN — GABAPENTIN 100 MG: 250 SOLUTION ORAL at 08:06

## 2021-06-03 RX ADMIN — AMLODIPINE BESYLATE 5 MG: 5 TABLET ORAL at 11:06

## 2021-06-03 RX ADMIN — EZETIMIBE 10 MG: 10 TABLET ORAL at 08:06

## 2021-06-03 RX ADMIN — CLOPIDOGREL 75 MG: 75 TABLET, FILM COATED ORAL at 08:06

## 2021-06-03 RX ADMIN — CHOLECALCIFEROL (VITAMIN D3) 25 MCG (1,000 UNIT) TABLET 1000 UNITS: TABLET at 08:06

## 2021-06-03 RX ADMIN — LATANOPROST 1 DROP: 50 SOLUTION OPHTHALMIC at 08:06

## 2021-06-03 RX ADMIN — LEVOTHYROXINE SODIUM 50 MCG: 50 TABLET ORAL at 08:06

## 2021-06-04 VITALS
OXYGEN SATURATION: 100 % | HEART RATE: 62 BPM | SYSTOLIC BLOOD PRESSURE: 144 MMHG | RESPIRATION RATE: 16 BRPM | HEIGHT: 62 IN | WEIGHT: 115 LBS | BODY MASS INDEX: 21.16 KG/M2 | DIASTOLIC BLOOD PRESSURE: 63 MMHG | TEMPERATURE: 98 F

## 2021-06-04 LAB
ANION GAP SERPL CALC-SCNC: 11 MMOL/L (ref 8–16)
BUN SERPL-MCNC: 26 MG/DL (ref 10–30)
CALCIUM SERPL-MCNC: 9.9 MG/DL (ref 8.7–10.5)
CHLORIDE SERPL-SCNC: 98 MMOL/L (ref 95–110)
CO2 SERPL-SCNC: 23 MMOL/L (ref 23–29)
CREAT SERPL-MCNC: 1.3 MG/DL (ref 0.5–1.4)
EST. GFR  (AFRICAN AMERICAN): 39.7 ML/MIN/1.73 M^2
EST. GFR  (NON AFRICAN AMERICAN): 34.5 ML/MIN/1.73 M^2
GLUCOSE SERPL-MCNC: 124 MG/DL (ref 70–110)
H PYLORI IGG SERPL QL IA: NEGATIVE
POTASSIUM SERPL-SCNC: 4.1 MMOL/L (ref 3.5–5.1)
SODIUM SERPL-SCNC: 132 MMOL/L (ref 136–145)

## 2021-06-04 PROCEDURE — 1111F DSCHRG MED/CURRENT MED MERGE: CPT | Mod: CPTII,GC,, | Performed by: HOSPITALIST

## 2021-06-04 PROCEDURE — 1111F PR DISCHARGE MEDS RECONCILED W/ CURRENT OUTPATIENT MED LIST: ICD-10-PCS | Mod: CPTII,GC,, | Performed by: HOSPITALIST

## 2021-06-04 PROCEDURE — 25000003 PHARM REV CODE 250: Performed by: STUDENT IN AN ORGANIZED HEALTH CARE EDUCATION/TRAINING PROGRAM

## 2021-06-04 PROCEDURE — 36415 COLL VENOUS BLD VENIPUNCTURE: CPT | Performed by: HOSPITALIST

## 2021-06-04 PROCEDURE — 80048 BASIC METABOLIC PNL TOTAL CA: CPT | Performed by: HOSPITALIST

## 2021-06-04 PROCEDURE — 99238 HOSP IP/OBS DSCHRG MGMT 30/<: CPT | Mod: GC,,, | Performed by: HOSPITALIST

## 2021-06-04 PROCEDURE — 99238 PR HOSPITAL DISCHARGE DAY,<30 MIN: ICD-10-PCS | Mod: GC,,, | Performed by: HOSPITALIST

## 2021-06-04 RX ORDER — ACETAMINOPHEN 325 MG/1
650 TABLET ORAL
Status: DISCONTINUED | OUTPATIENT
Start: 2021-06-04 | End: 2021-06-04 | Stop reason: HOSPADM

## 2021-06-04 RX ORDER — GABAPENTIN 250 MG/5ML
50 SOLUTION ORAL NIGHTLY
Status: DISCONTINUED | OUTPATIENT
Start: 2021-06-04 | End: 2021-06-04 | Stop reason: HOSPADM

## 2021-06-04 RX ORDER — ACETAMINOPHEN 325 MG/1
650 TABLET ORAL
Qty: 100 TABLET | Refills: 1 | Status: SHIPPED | OUTPATIENT
Start: 2021-06-04 | End: 2021-08-13

## 2021-06-04 RX ORDER — BRIMONIDINE TARTRATE 1.5 MG/ML
1 SOLUTION/ DROPS OPHTHALMIC 2 TIMES DAILY
Qty: 4 ML | Refills: 11 | Status: ON HOLD | OUTPATIENT
Start: 2021-06-04 | End: 2023-03-23

## 2021-06-04 RX ORDER — ALPRAZOLAM 0.25 MG/1
0.25 TABLET ORAL DAILY PRN
Status: DISCONTINUED | OUTPATIENT
Start: 2021-06-04 | End: 2021-06-04 | Stop reason: HOSPADM

## 2021-06-04 RX ORDER — BRIMONIDINE TARTRATE 1.5 MG/ML
1 SOLUTION/ DROPS OPHTHALMIC 2 TIMES DAILY
Status: DISCONTINUED | OUTPATIENT
Start: 2021-06-04 | End: 2021-06-04 | Stop reason: HOSPADM

## 2021-06-04 RX ADMIN — PANTOPRAZOLE SODIUM 40 MG: 40 TABLET, DELAYED RELEASE ORAL at 08:06

## 2021-06-04 RX ADMIN — DORZOLAMIDE HYDROCHLORIDE 1 DROP: 20 SOLUTION/ DROPS OPHTHALMIC at 08:06

## 2021-06-04 RX ADMIN — AMLODIPINE BESYLATE 5 MG: 5 TABLET ORAL at 08:06

## 2021-06-04 RX ADMIN — CHOLECALCIFEROL (VITAMIN D3) 25 MCG (1,000 UNIT) TABLET 1000 UNITS: TABLET at 08:06

## 2021-06-04 RX ADMIN — LEVOTHYROXINE SODIUM 50 MCG: 50 TABLET ORAL at 06:06

## 2021-06-04 RX ADMIN — ACETAMINOPHEN 650 MG: 325 TABLET ORAL at 06:06

## 2021-06-07 ENCOUNTER — PATIENT MESSAGE (OUTPATIENT)
Dept: CARDIOLOGY | Facility: CLINIC | Age: 86
End: 2021-06-07

## 2021-06-13 ENCOUNTER — PATIENT MESSAGE (OUTPATIENT)
Dept: PRIMARY CARE CLINIC | Facility: CLINIC | Age: 86
End: 2021-06-13

## 2021-06-14 ENCOUNTER — TELEPHONE (OUTPATIENT)
Dept: FAMILY MEDICINE | Facility: CLINIC | Age: 86
End: 2021-06-14

## 2021-06-14 RX ORDER — PANTOPRAZOLE SODIUM 40 MG/1
40 TABLET, DELAYED RELEASE ORAL DAILY
Qty: 30 TABLET | Refills: 2
Start: 2021-06-14 | End: 2021-06-16 | Stop reason: SDUPTHER

## 2021-06-16 ENCOUNTER — PATIENT MESSAGE (OUTPATIENT)
Dept: PRIMARY CARE CLINIC | Facility: CLINIC | Age: 86
End: 2021-06-16

## 2021-06-17 RX ORDER — PANTOPRAZOLE SODIUM 40 MG/1
40 TABLET, DELAYED RELEASE ORAL DAILY
Qty: 30 TABLET | Refills: 2 | Status: SHIPPED | OUTPATIENT
Start: 2021-06-17 | End: 2021-09-20 | Stop reason: SDUPTHER

## 2021-06-30 ENCOUNTER — TELEPHONE (OUTPATIENT)
Dept: FAMILY MEDICINE | Facility: CLINIC | Age: 86
End: 2021-06-30

## 2021-06-30 DIAGNOSIS — N39.0 URINARY TRACT INFECTION WITHOUT HEMATURIA, SITE UNSPECIFIED: Primary | ICD-10-CM

## 2021-06-30 RX ORDER — CIPROFLOXACIN 250 MG/1
250 TABLET, FILM COATED ORAL 2 TIMES DAILY
Qty: 10 TABLET | Refills: 0 | Status: SHIPPED | OUTPATIENT
Start: 2021-06-30 | End: 2021-08-13

## 2021-07-01 ENCOUNTER — PATIENT MESSAGE (OUTPATIENT)
Dept: CARDIOLOGY | Facility: HOSPITAL | Age: 86
End: 2021-07-01

## 2021-08-04 DIAGNOSIS — E03.9 ACQUIRED HYPOTHYROIDISM: Primary | ICD-10-CM

## 2021-08-04 RX ORDER — LEVOTHYROXINE SODIUM 50 UG/1
50 TABLET ORAL
Qty: 90 TABLET | Refills: 3 | Status: SHIPPED | OUTPATIENT
Start: 2021-08-04 | End: 2022-11-18

## 2021-08-12 ENCOUNTER — PATIENT OUTREACH (OUTPATIENT)
Dept: ADMINISTRATIVE | Facility: OTHER | Age: 86
End: 2021-08-12

## 2021-08-13 ENCOUNTER — OFFICE VISIT (OUTPATIENT)
Dept: CARDIOLOGY | Facility: CLINIC | Age: 86
End: 2021-08-13
Payer: MEDICARE

## 2021-08-13 VITALS
HEIGHT: 62 IN | DIASTOLIC BLOOD PRESSURE: 66 MMHG | BODY MASS INDEX: 20.4 KG/M2 | HEART RATE: 70 BPM | SYSTOLIC BLOOD PRESSURE: 134 MMHG | WEIGHT: 110.88 LBS

## 2021-08-13 DIAGNOSIS — Z95.0 PACEMAKER: Chronic | ICD-10-CM

## 2021-08-13 DIAGNOSIS — Z95.5 PRESENCE OF STENT IN CORONARY ARTERY: Chronic | ICD-10-CM

## 2021-08-13 DIAGNOSIS — I25.10 ARTERIOSCLEROSIS OF CORONARY ARTERY: Primary | Chronic | ICD-10-CM

## 2021-08-13 DIAGNOSIS — I10 ESSENTIAL HYPERTENSION: ICD-10-CM

## 2021-08-13 DIAGNOSIS — E78.00 PURE HYPERCHOLESTEROLEMIA: Chronic | ICD-10-CM

## 2021-08-13 PROCEDURE — 1101F PR PT FALLS ASSESS DOC 0-1 FALLS W/OUT INJ PAST YR: ICD-10-PCS | Mod: CPTII,S$GLB,, | Performed by: INTERNAL MEDICINE

## 2021-08-13 PROCEDURE — 99499 UNLISTED E&M SERVICE: CPT | Mod: HCNC,S$GLB,, | Performed by: INTERNAL MEDICINE

## 2021-08-13 PROCEDURE — 1160F PR REVIEW ALL MEDS BY PRESCRIBER/CLIN PHARMACIST DOCUMENTED: ICD-10-PCS | Mod: CPTII,S$GLB,, | Performed by: INTERNAL MEDICINE

## 2021-08-13 PROCEDURE — 99999 PR PBB SHADOW E&M-EST. PATIENT-LVL IV: CPT | Mod: PBBFAC,,, | Performed by: INTERNAL MEDICINE

## 2021-08-13 PROCEDURE — 1159F PR MEDICATION LIST DOCUMENTED IN MEDICAL RECORD: ICD-10-PCS | Mod: CPTII,S$GLB,, | Performed by: INTERNAL MEDICINE

## 2021-08-13 PROCEDURE — 1126F PR PAIN SEVERITY QUANTIFIED, NO PAIN PRESENT: ICD-10-PCS | Mod: CPTII,S$GLB,, | Performed by: INTERNAL MEDICINE

## 2021-08-13 PROCEDURE — 99214 OFFICE O/P EST MOD 30 MIN: CPT | Mod: S$GLB,,, | Performed by: INTERNAL MEDICINE

## 2021-08-13 PROCEDURE — 1160F RVW MEDS BY RX/DR IN RCRD: CPT | Mod: CPTII,S$GLB,, | Performed by: INTERNAL MEDICINE

## 2021-08-13 PROCEDURE — 1126F AMNT PAIN NOTED NONE PRSNT: CPT | Mod: CPTII,S$GLB,, | Performed by: INTERNAL MEDICINE

## 2021-08-13 PROCEDURE — 3288F PR FALLS RISK ASSESSMENT DOCUMENTED: ICD-10-PCS | Mod: CPTII,S$GLB,, | Performed by: INTERNAL MEDICINE

## 2021-08-13 PROCEDURE — 1159F MED LIST DOCD IN RCRD: CPT | Mod: CPTII,S$GLB,, | Performed by: INTERNAL MEDICINE

## 2021-08-13 PROCEDURE — 3288F FALL RISK ASSESSMENT DOCD: CPT | Mod: CPTII,S$GLB,, | Performed by: INTERNAL MEDICINE

## 2021-08-13 PROCEDURE — 1101F PT FALLS ASSESS-DOCD LE1/YR: CPT | Mod: CPTII,S$GLB,, | Performed by: INTERNAL MEDICINE

## 2021-08-13 PROCEDURE — 99214 PR OFFICE/OUTPT VISIT, EST, LEVL IV, 30-39 MIN: ICD-10-PCS | Mod: S$GLB,,, | Performed by: INTERNAL MEDICINE

## 2021-08-13 PROCEDURE — 99499 RISK ADDL DX/OHS AUDIT: ICD-10-PCS | Mod: HCNC,S$GLB,, | Performed by: INTERNAL MEDICINE

## 2021-08-13 PROCEDURE — 99999 PR PBB SHADOW E&M-EST. PATIENT-LVL IV: ICD-10-PCS | Mod: PBBFAC,,, | Performed by: INTERNAL MEDICINE

## 2021-08-13 RX ORDER — GABAPENTIN 100 MG/1
100 CAPSULE ORAL 2 TIMES DAILY
COMMUNITY
End: 2021-09-20 | Stop reason: SDUPTHER

## 2021-09-14 ENCOUNTER — TELEPHONE (OUTPATIENT)
Dept: FAMILY MEDICINE | Facility: CLINIC | Age: 86
End: 2021-09-14

## 2021-09-17 ENCOUNTER — PATIENT MESSAGE (OUTPATIENT)
Dept: PRIMARY CARE CLINIC | Facility: CLINIC | Age: 86
End: 2021-09-17

## 2021-09-17 DIAGNOSIS — F41.9 ANXIETY: ICD-10-CM

## 2021-09-17 RX ORDER — ALPRAZOLAM 0.25 MG/1
0.25 TABLET ORAL NIGHTLY
Qty: 30 TABLET | Refills: 1 | Status: SHIPPED | OUTPATIENT
Start: 2021-09-17 | End: 2021-09-20 | Stop reason: SDUPTHER

## 2021-09-20 DIAGNOSIS — F41.9 ANXIETY: ICD-10-CM

## 2021-09-20 DIAGNOSIS — H40.1133 PRIMARY OPEN ANGLE GLAUCOMA OF BOTH EYES, SEVERE STAGE: ICD-10-CM

## 2021-09-20 RX ORDER — PANTOPRAZOLE SODIUM 40 MG/1
40 TABLET, DELAYED RELEASE ORAL DAILY
Qty: 90 TABLET | Refills: 2 | Status: SHIPPED | OUTPATIENT
Start: 2021-09-20 | End: 2022-08-01 | Stop reason: SDUPTHER

## 2021-09-20 RX ORDER — GABAPENTIN 100 MG/1
100 CAPSULE ORAL 2 TIMES DAILY
Qty: 180 CAPSULE | Refills: 1 | Status: SHIPPED | OUTPATIENT
Start: 2021-09-20 | End: 2022-05-24

## 2021-09-20 RX ORDER — ALPRAZOLAM 0.25 MG/1
0.25 TABLET ORAL NIGHTLY
Qty: 30 TABLET | Refills: 1 | Status: SHIPPED | OUTPATIENT
Start: 2021-09-20 | End: 2021-12-30 | Stop reason: SDUPTHER

## 2021-09-21 ENCOUNTER — PATIENT MESSAGE (OUTPATIENT)
Dept: CARDIOLOGY | Facility: CLINIC | Age: 86
End: 2021-09-21

## 2021-09-21 RX ORDER — LISINOPRIL 20 MG/1
20 TABLET ORAL DAILY
Qty: 30 TABLET | Refills: 0 | Status: SHIPPED | OUTPATIENT
Start: 2021-09-21 | End: 2021-11-01

## 2021-09-24 ENCOUNTER — TELEPHONE (OUTPATIENT)
Dept: ELECTROPHYSIOLOGY | Facility: CLINIC | Age: 86
End: 2021-09-24

## 2021-09-24 ENCOUNTER — PATIENT MESSAGE (OUTPATIENT)
Dept: ELECTROPHYSIOLOGY | Facility: CLINIC | Age: 86
End: 2021-09-24

## 2021-10-28 ENCOUNTER — CLINICAL SUPPORT (OUTPATIENT)
Dept: CARDIOLOGY | Facility: HOSPITAL | Age: 86
End: 2021-10-28
Payer: MEDICARE

## 2021-10-28 DIAGNOSIS — Z95.0 PRESENCE OF CARDIAC PACEMAKER: ICD-10-CM

## 2021-10-28 PROCEDURE — 93294 REM INTERROG EVL PM/LDLS PM: CPT | Mod: HCNC,,, | Performed by: INTERNAL MEDICINE

## 2021-10-28 PROCEDURE — 93296 REM INTERROG EVL PM/IDS: CPT | Mod: HCNC | Performed by: INTERNAL MEDICINE

## 2021-10-28 PROCEDURE — 93294 CARDIAC DEVICE CHECK - REMOTE: ICD-10-PCS | Mod: HCNC,,, | Performed by: INTERNAL MEDICINE

## 2021-11-02 DIAGNOSIS — Z95.0 PACEMAKER: Primary | ICD-10-CM

## 2021-11-07 ENCOUNTER — PATIENT MESSAGE (OUTPATIENT)
Dept: PRIMARY CARE CLINIC | Facility: CLINIC | Age: 86
End: 2021-11-07
Payer: MEDICARE

## 2021-12-01 ENCOUNTER — PATIENT MESSAGE (OUTPATIENT)
Dept: CARDIOLOGY | Facility: CLINIC | Age: 86
End: 2021-12-01
Payer: MEDICARE

## 2021-12-29 ENCOUNTER — PATIENT MESSAGE (OUTPATIENT)
Dept: FAMILY MEDICINE | Facility: CLINIC | Age: 86
End: 2021-12-29
Payer: MEDICARE

## 2021-12-29 DIAGNOSIS — F41.9 ANXIETY: ICD-10-CM

## 2021-12-30 RX ORDER — ALPRAZOLAM 0.25 MG/1
0.25 TABLET ORAL NIGHTLY
Qty: 30 TABLET | Refills: 1 | Status: SHIPPED | OUTPATIENT
Start: 2021-12-30 | End: 2022-01-03 | Stop reason: SDUPTHER

## 2022-01-26 ENCOUNTER — CLINICAL SUPPORT (OUTPATIENT)
Dept: CARDIOLOGY | Facility: HOSPITAL | Age: 87
End: 2022-01-26
Payer: MEDICARE

## 2022-01-26 DIAGNOSIS — I44.2 ATRIOVENTRICULAR BLOCK, COMPLETE: ICD-10-CM

## 2022-01-26 DIAGNOSIS — Z95.0 PRESENCE OF CARDIAC PACEMAKER: ICD-10-CM

## 2022-01-26 PROCEDURE — 93294 CARDIAC DEVICE CHECK - REMOTE: ICD-10-PCS | Mod: HCNC,,, | Performed by: INTERNAL MEDICINE

## 2022-01-26 PROCEDURE — 93296 REM INTERROG EVL PM/IDS: CPT | Mod: HCNC | Performed by: INTERNAL MEDICINE

## 2022-01-26 PROCEDURE — 93294 REM INTERROG EVL PM/LDLS PM: CPT | Mod: HCNC,,, | Performed by: INTERNAL MEDICINE

## 2022-02-04 ENCOUNTER — PATIENT MESSAGE (OUTPATIENT)
Dept: CARDIOLOGY | Facility: CLINIC | Age: 87
End: 2022-02-04
Payer: MEDICARE

## 2022-02-15 ENCOUNTER — TELEPHONE (OUTPATIENT)
Dept: CARDIOLOGY | Facility: CLINIC | Age: 87
End: 2022-02-15
Payer: MEDICARE

## 2022-02-15 NOTE — TELEPHONE ENCOUNTER
----- Message from Yvette Partida MA sent at 2/15/2022  1:13 PM CST -----  Alessandra please call the patient at home she need  to talk to you about her recall  [phone].  Thank you.

## 2022-02-28 ENCOUNTER — TELEPHONE (OUTPATIENT)
Dept: CARDIOLOGY | Facility: CLINIC | Age: 87
End: 2022-02-28
Payer: MEDICARE

## 2022-02-28 NOTE — TELEPHONE ENCOUNTER
----- Message from Leona Stone sent at 2/28/2022 10:21 AM CST -----  Regarding: Missed Call  Pt [phone] returning missed call from the office.    Thanks

## 2022-03-02 ENCOUNTER — CLINICAL SUPPORT (OUTPATIENT)
Dept: CARDIOLOGY | Facility: HOSPITAL | Age: 87
End: 2022-03-02
Attending: INTERNAL MEDICINE
Payer: MEDICARE

## 2022-03-02 DIAGNOSIS — Z95.0 PACEMAKER: ICD-10-CM

## 2022-03-10 ENCOUNTER — PATIENT OUTREACH (OUTPATIENT)
Dept: ADMINISTRATIVE | Facility: OTHER | Age: 87
End: 2022-03-10
Payer: MEDICARE

## 2022-03-11 ENCOUNTER — OFFICE VISIT (OUTPATIENT)
Dept: CARDIOLOGY | Facility: CLINIC | Age: 87
End: 2022-03-11
Payer: MEDICARE

## 2022-03-11 ENCOUNTER — LAB VISIT (OUTPATIENT)
Dept: LAB | Facility: HOSPITAL | Age: 87
End: 2022-03-11
Attending: INTERNAL MEDICINE
Payer: MEDICARE

## 2022-03-11 VITALS
DIASTOLIC BLOOD PRESSURE: 85 MMHG | HEART RATE: 75 BPM | SYSTOLIC BLOOD PRESSURE: 174 MMHG | BODY MASS INDEX: 19.47 KG/M2 | WEIGHT: 105.81 LBS | HEIGHT: 62 IN

## 2022-03-11 DIAGNOSIS — I25.10 CORONARY ARTERY DISEASE INVOLVING NATIVE CORONARY ARTERY OF NATIVE HEART WITHOUT ANGINA PECTORIS: Chronic | ICD-10-CM

## 2022-03-11 DIAGNOSIS — I10 PRIMARY HYPERTENSION: Chronic | ICD-10-CM

## 2022-03-11 DIAGNOSIS — Z95.0 PACEMAKER: ICD-10-CM

## 2022-03-11 DIAGNOSIS — E78.00 PURE HYPERCHOLESTEROLEMIA: Chronic | ICD-10-CM

## 2022-03-11 DIAGNOSIS — Z95.5 PRESENCE OF STENT IN CORONARY ARTERY: ICD-10-CM

## 2022-03-11 DIAGNOSIS — I10 PRIMARY HYPERTENSION: Primary | Chronic | ICD-10-CM

## 2022-03-11 LAB
ANION GAP SERPL CALC-SCNC: 12 MMOL/L (ref 8–16)
BASOPHILS # BLD AUTO: 0.03 K/UL (ref 0–0.2)
BASOPHILS NFR BLD: 0.5 % (ref 0–1.9)
BUN SERPL-MCNC: 23 MG/DL (ref 10–30)
CALCIUM SERPL-MCNC: 10.2 MG/DL (ref 8.7–10.5)
CHLORIDE SERPL-SCNC: 100 MMOL/L (ref 95–110)
CO2 SERPL-SCNC: 25 MMOL/L (ref 23–29)
CREAT SERPL-MCNC: 1.2 MG/DL (ref 0.5–1.4)
DIFFERENTIAL METHOD: ABNORMAL
EOSINOPHIL # BLD AUTO: 0 K/UL (ref 0–0.5)
EOSINOPHIL NFR BLD: 0.7 % (ref 0–8)
ERYTHROCYTE [DISTWIDTH] IN BLOOD BY AUTOMATED COUNT: 12.9 % (ref 11.5–14.5)
EST. GFR  (AFRICAN AMERICAN): 43.5 ML/MIN/1.73 M^2
EST. GFR  (NON AFRICAN AMERICAN): 37.7 ML/MIN/1.73 M^2
GLUCOSE SERPL-MCNC: 82 MG/DL (ref 70–110)
HCT VFR BLD AUTO: 40 % (ref 37–48.5)
HGB BLD-MCNC: 12.8 G/DL (ref 12–16)
IMM GRANULOCYTES # BLD AUTO: 0.01 K/UL (ref 0–0.04)
IMM GRANULOCYTES NFR BLD AUTO: 0.2 % (ref 0–0.5)
LYMPHOCYTES # BLD AUTO: 1.9 K/UL (ref 1–4.8)
LYMPHOCYTES NFR BLD: 31.8 % (ref 18–48)
MCH RBC QN AUTO: 32.7 PG (ref 27–31)
MCHC RBC AUTO-ENTMCNC: 32 G/DL (ref 32–36)
MCV RBC AUTO: 102 FL (ref 82–98)
MONOCYTES # BLD AUTO: 0.4 K/UL (ref 0.3–1)
MONOCYTES NFR BLD: 7.2 % (ref 4–15)
NEUTROPHILS # BLD AUTO: 3.6 K/UL (ref 1.8–7.7)
NEUTROPHILS NFR BLD: 59.6 % (ref 38–73)
NRBC BLD-RTO: 0 /100 WBC
PLATELET # BLD AUTO: 203 K/UL (ref 150–450)
PMV BLD AUTO: 11.2 FL (ref 9.2–12.9)
POTASSIUM SERPL-SCNC: 4.8 MMOL/L (ref 3.5–5.1)
RBC # BLD AUTO: 3.91 M/UL (ref 4–5.4)
SODIUM SERPL-SCNC: 137 MMOL/L (ref 136–145)
WBC # BLD AUTO: 6.1 K/UL (ref 3.9–12.7)

## 2022-03-11 PROCEDURE — 3288F PR FALLS RISK ASSESSMENT DOCUMENTED: ICD-10-PCS | Mod: HCNC,CPTII,S$GLB, | Performed by: INTERNAL MEDICINE

## 2022-03-11 PROCEDURE — 1159F MED LIST DOCD IN RCRD: CPT | Mod: HCNC,CPTII,S$GLB, | Performed by: INTERNAL MEDICINE

## 2022-03-11 PROCEDURE — 1160F PR REVIEW ALL MEDS BY PRESCRIBER/CLIN PHARMACIST DOCUMENTED: ICD-10-PCS | Mod: HCNC,CPTII,S$GLB, | Performed by: INTERNAL MEDICINE

## 2022-03-11 PROCEDURE — 85025 COMPLETE CBC W/AUTO DIFF WBC: CPT | Mod: HCNC | Performed by: INTERNAL MEDICINE

## 2022-03-11 PROCEDURE — 1160F RVW MEDS BY RX/DR IN RCRD: CPT | Mod: HCNC,CPTII,S$GLB, | Performed by: INTERNAL MEDICINE

## 2022-03-11 PROCEDURE — 1101F PR PT FALLS ASSESS DOC 0-1 FALLS W/OUT INJ PAST YR: ICD-10-PCS | Mod: HCNC,CPTII,S$GLB, | Performed by: INTERNAL MEDICINE

## 2022-03-11 PROCEDURE — 80048 BASIC METABOLIC PNL TOTAL CA: CPT | Mod: HCNC | Performed by: INTERNAL MEDICINE

## 2022-03-11 PROCEDURE — 99999 PR PBB SHADOW E&M-EST. PATIENT-LVL IV: ICD-10-PCS | Mod: PBBFAC,HCNC,, | Performed by: INTERNAL MEDICINE

## 2022-03-11 PROCEDURE — 99999 PR PBB SHADOW E&M-EST. PATIENT-LVL IV: CPT | Mod: PBBFAC,HCNC,, | Performed by: INTERNAL MEDICINE

## 2022-03-11 PROCEDURE — 3288F FALL RISK ASSESSMENT DOCD: CPT | Mod: HCNC,CPTII,S$GLB, | Performed by: INTERNAL MEDICINE

## 2022-03-11 PROCEDURE — 1159F PR MEDICATION LIST DOCUMENTED IN MEDICAL RECORD: ICD-10-PCS | Mod: HCNC,CPTII,S$GLB, | Performed by: INTERNAL MEDICINE

## 2022-03-11 PROCEDURE — 99214 PR OFFICE/OUTPT VISIT, EST, LEVL IV, 30-39 MIN: ICD-10-PCS | Mod: HCNC,S$GLB,, | Performed by: INTERNAL MEDICINE

## 2022-03-11 PROCEDURE — 1126F AMNT PAIN NOTED NONE PRSNT: CPT | Mod: HCNC,CPTII,S$GLB, | Performed by: INTERNAL MEDICINE

## 2022-03-11 PROCEDURE — 1101F PT FALLS ASSESS-DOCD LE1/YR: CPT | Mod: HCNC,CPTII,S$GLB, | Performed by: INTERNAL MEDICINE

## 2022-03-11 PROCEDURE — 1126F PR PAIN SEVERITY QUANTIFIED, NO PAIN PRESENT: ICD-10-PCS | Mod: HCNC,CPTII,S$GLB, | Performed by: INTERNAL MEDICINE

## 2022-03-11 PROCEDURE — 99214 OFFICE O/P EST MOD 30 MIN: CPT | Mod: HCNC,S$GLB,, | Performed by: INTERNAL MEDICINE

## 2022-03-11 PROCEDURE — 36415 COLL VENOUS BLD VENIPUNCTURE: CPT | Mod: HCNC,PO | Performed by: INTERNAL MEDICINE

## 2022-03-11 NOTE — PROGRESS NOTES
Subjective:   03/11/2022     Patient ID:  Cat Bolivar is a 89 y.o. female who presents for evaulation of Coronary Artery Disease      Patient comes in for follow-up.  Her blood pressure is elevated today.  She had been on amlodipine in the past, now not taking.      She was admitted several months ago with GI bleed, felt to be due to nonsteroidal anti-inflammatory agents and gastric ulcers.  She has not had recurrent problems.  She remains on clopidogrel 75 mg daily.    She does have a history of coronary artery disease status post stents, she has not had chest pains or tightness, PND or orthopnea.    She does have a history of hypercholesterolemia.  She has been intolerant to statins.  She is on Zetia.  A lipid profile in February showed a total cholesterol of 195, triglycerides 99, HDL 83 and LDL 96.     She is status post permanent pacemaker many years ago.  Pacemaker function has been normal on checks.  Due for replacement about 1 year        Hypertension  Associated symptoms include malaise/fatigue and neck pain. Pertinent negatives include no chest pain, headaches, orthopnea, palpitations, PND or shortness of breath.   Coronary Artery Disease  Pertinent negatives include no chest pain, leg swelling, palpitations, shortness of breath or weight gain.       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    Myopia with astigmatism and presbyopia - Both Eyes    LBP (low back pain)    Marginal ulcer - Left Eye    Blepharoconjunctivitis - Both Eyes    Back pain    Cervical spondylosis without myelopathy    Hypertensive heart disease without heart failure    Hypothyroidism    Pacemaker    Anxiety    Chronic neck pain     Rotator cuff tendonitis    Presence of stent in coronary artery    Fibromyositis    Glaucoma    Hiatal hernia    Hyperparathyroidism    Irritable bowel syndrome    Osteoporosis    Prolapsing mitral leaflet syndrome    Pure hypercholesterolemia    Shoulder joint pain    Statin intolerance    Temporomandibular joint pain-dysfunction syndrome    Thyroid nodule    Carotid artery disease    Mild recurrent major depression    Rectal bleeding    Gastrointestinal hemorrhage with melena    Spinal compression fracture    Coronary artery disease involving native coronary artery of native heart without angina pectoris    Primary hypertension    Urinary retention    Normal anion gap metabolic acidosis        Past Medical History:   Diagnosis Date    Carotid artery disease     Cervical spondylosis without myelopathy 4/28/2014    Fibromyalgia     Fracture of wrist     Fracture, ankle     both at diff times    Glaucoma     Hyperparathyroidism     Hypothyroidism     Osteoporosis, post-menopausal     Unspecified vitamin D deficiency 10/30/2012       Past Surgical History:   Procedure Laterality Date    APPENDECTOMY      Bilateral Levator Repair  7/03    [facility] (Sangeeta Still)    CARDIAC PACEMAKER PLACEMENT      carotid endarterectomy      CATARACT EXTRACTION W/  INTRAOCULAR LENS IMPLANT Bilateral 1990's        CATARACT EXTRACTION W/ INTRAOCULAR LENS IMPLANTW/ TRABECULECTOMY Right 03/1994    [facility] ( Dr. Ott)    CORONARY ANGIOPLASTY WITH STENT PLACEMENT      ESOPHAGOGASTRODUODENOSCOPY N/A 5/31/2021    Procedure: EGD (ESOPHAGOGASTRODUODENOSCOPY);  Surgeon: Brooks Sanchez MD;  Location: [facility] ([address] [place] [address]);  Service: Endoscopy;  Laterality: N/A;    HYSTERECTOMY      Ptosis Revision Left 10/2003    [facility] (Dr. Ott)    RETINAL DETACHMENT SURGERY  2/02    [facility] (Dr. Mckeon)    thyroid nodule      WRIST SURGERY         Review of patient's allergies indicates:   Allergen Reactions    Aspirin       Other reaction(s): HEADACHES    Citalopram      tachycardia    Codeine Other (See Comments)     Dizziness    Cytotec [misoprostol] Other (See Comments)     Unknown      Feldene [piroxicam] Other (See Comments)     unknown    Gentamicin Other (See Comments)     Flushing      Indocin [indomethacin sodium] Other (See Comments)     Unknown      Motrin [ibuprofen] Nausea And Vomiting    Paxil [paroxetine hcl] Other (See Comments)     Suicidal feelings    Prozac [fluoxetine] Other (See Comments)     Suicidal feeling    Tofranil [imipramine hcl] Other (See Comments)     Feeling restless    Vancomycin analogues Other (See Comments)     Unknown      Elavil [amitriptyline] Palpitations    Penicillins Rash     Other reaction(s): RASH         Current Outpatient Medications:     ALPRAZolam (XANAX) 0.25 MG tablet, Take 1 tablet (0.25 mg total) by mouth nightly., Disp: 30 tablet, Rfl: 1    BIOTIN ORAL, Take 1,000 mg by mouth once daily., Disp: , Rfl:     brimonidine 0.15 % OPTH DROP (ALPHAGAN) 0.15 % ophthalmic solution, Place 1 drop into both eyes 2 (two) times a day., Disp: 4 mL, Rfl: 11    calcitonin, salmon, (FORTICAL) 200 unit/actuation nasal spray, 1 spray by Nasal route once daily., Disp: 1 Bottle, Rfl: 0    clopidogreL (PLAVIX) 75 mg tablet, TAKE 1 TABLET (75 MG TOTAL) BY MOUTH FOUR TIMES A WEEK., Disp: 52 tablet, Rfl: 3    coenzyme Q10 10 mg capsule, Take 10 mg by mouth once daily., Disp: , Rfl:     diclofenac sodium (VOLTAREN) 1 % Gel, Apply 2 g topically once daily. (Patient taking differently: Apply 2 g topically as needed.), Disp: 100 g, Rfl: 11    dorzolamide (TRUSOPT) 2 % ophthalmic solution, Place 1 drop into both eyes 3 (three) times daily. (Patient taking differently: Place 1 drop into both eyes 2 (two) times a day.), Disp: 30 mL, Rfl: 3    ergocalciferol, vitamin D2, (VITAMIN D ORAL), Take 50 mcg by mouth once daily., Disp: , Rfl:     ezetimibe (ZETIA) 10 mg tablet, Take 1 tablet (10 mg  total) by mouth every evening., Disp: 90 tablet, Rfl: 3    fexofenadine (ALLEGRA) 60 MG tablet, Take 60 mg by mouth as needed. , Disp: , Rfl:     fluticasone (FLONASE) 50 mcg/actuation nasal spray, 1 spray by Nasal route once daily. , Disp: , Rfl:     gabapentin (NEURONTIN) 100 MG capsule, Take 1 capsule (100 mg total) by mouth 2 (two) times daily., Disp: 180 capsule, Rfl: 1    latanoprost 0.005 % ophthalmic solution, INSTILL 1 DROP INTO BOTH EYES EVERY EVENING. , Disp: 3 Bottle, Rfl: 3    levothyroxine (SYNTHROID) 50 MCG tablet, Take 1 tablet (50 mcg total) by mouth before breakfast., Disp: 90 tablet, Rfl: 3    LIDOcaine (LIDODERM) 5 %, Place 1 patch onto the skin once daily. Remove & Discard patch within 12 hours or as directed by MD, Disp: 30 patch, Rfl: 0    lisinopriL (PRINIVIL,ZESTRIL) 20 MG tablet, Take 1 tablet (20 mg total) by mouth once daily., Disp: 90 tablet, Rfl: 3    cycloSPORINE (RESTASIS) 0.05 % ophthalmic emulsion, Place 0.4 mLs (1 drop total) into both eyes 2 (two) times daily., Disp: 180 each, Rfl: 3    pantoprazole (PROTONIX) 40 MG tablet, Take 1 tablet (40 mg total) by mouth once daily., Disp: 90 tablet, Rfl: 2              Objective:   Review of Systems   Constitutional: Positive for malaise/fatigue and weight loss. Negative for chills, decreased appetite, diaphoresis, fever and weight gain.   HENT: Positive for hearing loss. Negative for congestion, nosebleeds and sore throat.    Eyes: Negative for double vision, vision loss in left eye and vision loss in right eye.   Cardiovascular: Negative for chest pain, claudication, cyanosis, dyspnea on exertion, irregular heartbeat, leg swelling, near-syncope, orthopnea, palpitations, paroxysmal nocturnal dyspnea and syncope.   Respiratory: Negative for cough, hemoptysis, shortness of breath, sleep disturbances due to breathing, snoring and wheezing.    Endocrine: Negative for cold intolerance and heat intolerance.   Hematologic/Lymphatic:  Negative for adenopathy and bleeding problem. Does not bruise/bleed easily.   Skin: Negative for itching, nail changes and rash.   Musculoskeletal: Positive for arthritis, back pain, joint pain and neck pain. Negative for falls and myalgias.   Gastrointestinal: Negative for bloating, abdominal pain, anorexia, constipation, diarrhea, hematochezia, melena, nausea and vomiting.   Genitourinary: Negative for frequency, hematuria and nocturia.   Neurological: Negative for focal weakness, headaches, vertigo and weakness.   Psychiatric/Behavioral: Positive for depression. Negative for altered mental status and memory loss.   Allergic/Immunologic: Negative for hives.       Vitals:    03/11/22 1145   BP: (!) 174/85   Pulse: 75       Physical Exam  Vitals reviewed.   Constitutional:       General: She is not in acute distress.     Appearance: She is well-developed.   HENT:      Head: Normocephalic and atraumatic.      Nose: Nose normal.   Eyes:      Conjunctiva/sclera: Conjunctivae normal.      Pupils: Pupils are equal, round, and reactive to light.   Neck:      Vascular: No JVD.   Cardiovascular:      Rate and Rhythm: Normal rate and regular rhythm.      Pulses: Intact distal pulses. Decreased pulses.      Heart sounds: Murmur (Grade 1 systolic ejection murmur) heard.    Medium-pitched midsystolic murmur is present with a grade of 2/6.    No friction rub. No gallop.      Comments: Trace edema present  Jugular venous pressure normal  Pulmonary:      Effort: Pulmonary effort is normal. No respiratory distress.      Breath sounds: Normal breath sounds. No wheezing or rales.   Chest:      Chest wall: No tenderness.   Abdominal:      General: Bowel sounds are normal. There is no distension.      Palpations: Abdomen is soft.      Tenderness: There is no abdominal tenderness.   Musculoskeletal:         General: No tenderness or deformity. Normal range of motion.      Cervical back: Normal range of motion and neck supple.      Right  lower leg: Edema (1+) present.      Left lower leg: Edema ( 1+) present.   Skin:     General: Skin is warm and dry.      Findings: No erythema or rash.   Neurological:      Mental Status: She is alert and oriented to person, place, and time.      Cranial Nerves: No cranial nerve deficit.      Motor: No abnormal muscle tone.      Coordination: Coordination normal.   Psychiatric:         Behavior: Behavior normal.         Thought Content: Thought content normal.         Judgment: Judgment normal.         No results displayed because visit has over 200 results.      Admission on 05/25/2021, Discharged on 05/25/2021   Component Date Value    Specimen UA 05/25/2021 Urine, Clean Catch     Color, UA 05/25/2021 Straw     Appearance, UA 05/25/2021 Clear     pH, UA 05/25/2021 6.0     Specific Gravity, UA 05/25/2021 1.010     Protein, UA 05/25/2021 Negative     Glucose, UA 05/25/2021 Negative     Ketones, UA 05/25/2021 Trace (A)    Bilirubin (UA) 05/25/2021 Negative     Occult Blood UA 05/25/2021 Negative     Nitrite, UA 05/25/2021 Negative     Leukocytes, UA 05/25/2021 Negative     WBC 05/25/2021 6.08     RBC 05/25/2021 4.09     Hemoglobin 05/25/2021 12.8     Hematocrit 05/25/2021 38.5     MCV 05/25/2021 94     MCH 05/25/2021 31.3 (A)    MCHC 05/25/2021 33.2     RDW 05/25/2021 13.2     Platelets 05/25/2021 223     MPV 05/25/2021 9.9     Immature Granulocytes 05/25/2021 0.5     Gran # (ANC) 05/25/2021 4.1     Immature Grans (Abs) 05/25/2021 0.03     Lymph # 05/25/2021 1.4     Mono # 05/25/2021 0.6     Eos # 05/25/2021 0.0     Baso # 05/25/2021 0.03     nRBC 05/25/2021 0     Gran % 05/25/2021 67.2     Lymph % 05/25/2021 22.4     Mono % 05/25/2021 9.2     Eosinophil % 05/25/2021 0.2     Basophil % 05/25/2021 0.5     Differential Method 05/25/2021 Automated     POC Glucose 05/25/2021 110     POC BUN 05/25/2021 19     POC Creatinine 05/25/2021 1.2     POC Sodium 05/25/2021 129 (A)    POC  Potassium 05/25/2021 4.6     POC Chloride 05/25/2021 95     POC TCO2 (MEASURED) 05/25/2021 24     POC Ionized Calcium 05/25/2021 1.12     POC Hematocrit 05/25/2021 41     Sample 05/25/2021 MUNIR    Admission on 05/21/2021, Discharged on 05/21/2021   Component Date Value    WBC 05/21/2021 7.27     RBC 05/21/2021 3.89 (A)    Hemoglobin 05/21/2021 12.5     Hematocrit 05/21/2021 37.3     MCV 05/21/2021 96     MCH 05/21/2021 32.1 (A)    MCHC 05/21/2021 33.5     RDW 05/21/2021 13.2     Platelets 05/21/2021 220     MPV 05/21/2021 10.2     Immature Granulocytes 05/21/2021 0.3     Gran # (ANC) 05/21/2021 5.1     Immature Grans (Abs) 05/21/2021 0.02     Lymph # 05/21/2021 1.6     Mono # 05/21/2021 0.5     Eos # 05/21/2021 0.0     Baso # 05/21/2021 0.02     nRBC 05/21/2021 0     Gran % 05/21/2021 70.5     Lymph % 05/21/2021 21.7     Mono % 05/21/2021 6.9     Eosinophil % 05/21/2021 0.3     Basophil % 05/21/2021 0.3     Differential Method 05/21/2021 Automated     POC Glucose 05/21/2021 113 (A)    POC BUN 05/21/2021 24     POC Creatinine 05/21/2021 1.1     POC Sodium 05/21/2021 131 (A)    POC Potassium 05/21/2021 4.4     POC Chloride 05/21/2021 99     POC TCO2 (MEASURED) 05/21/2021 23     POC Ionized Calcium 05/21/2021 1.22     POC Hematocrit 05/21/2021 38     Sample 05/21/2021 MUNIR         Assessment and Plan:     Primary hypertension  Comments:  That will take blood pressures over the weekend, CBC and BMP to be obtained today, discussed further blood pressure medication next week  Orders:  -     Basic Metabolic Panel; Future; Expected date: 03/11/2022    Coronary artery disease involving native coronary artery of native heart without angina pectoris  Comments:  No angina  Orders:  -     CBC Auto Differential; Future; Expected date: 03/11/2022    Pacemaker  Comments:  Normal function    Presence of stent in coronary artery    Pure hypercholesterolemia  Comments:  Statin intolerance          Follow up in about 8 months (around 11/11/2022).

## 2022-03-12 ENCOUNTER — HOSPITAL ENCOUNTER (EMERGENCY)
Facility: HOSPITAL | Age: 87
Discharge: HOME | End: 2022-03-12
Attending: EMERGENCY MEDICINE
Payer: MEDICARE

## 2022-03-12 VITALS
TEMPERATURE: 98 F | RESPIRATION RATE: 20 BRPM | OXYGEN SATURATION: 100 % | HEART RATE: 94 BPM | BODY MASS INDEX: 18.29 KG/M2 | SYSTOLIC BLOOD PRESSURE: 188 MMHG | DIASTOLIC BLOOD PRESSURE: 99 MMHG | WEIGHT: 100 LBS

## 2022-03-12 DIAGNOSIS — I10 PRIMARY HYPERTENSION: Primary | ICD-10-CM

## 2022-03-12 PROCEDURE — 25000003 PHARM REV CODE 250: Mod: HCNC | Performed by: EMERGENCY MEDICINE

## 2022-03-12 PROCEDURE — 99284 PR EMERGENCY DEPT VISIT,LEVEL IV: ICD-10-PCS | Mod: HCNC,,, | Performed by: EMERGENCY MEDICINE

## 2022-03-12 PROCEDURE — 99284 EMERGENCY DEPT VISIT MOD MDM: CPT | Mod: HCNC,,, | Performed by: EMERGENCY MEDICINE

## 2022-03-12 PROCEDURE — 99284 EMERGENCY DEPT VISIT MOD MDM: CPT | Mod: 25

## 2022-03-12 RX ORDER — ACETAMINOPHEN 500 MG
1000 TABLET ORAL
Status: COMPLETED | OUTPATIENT
Start: 2022-03-12 | End: 2022-03-12

## 2022-03-12 RX ADMIN — ACETAMINOPHEN 1000 MG: 500 TABLET ORAL at 10:03

## 2022-03-12 NOTE — ED TRIAGE NOTES
Pt presents to the ED c/o HTN. Per pt's niece, pt was seen by cardiology yesterday and instructed to keep a log of BPs this weekend and send them to him on Monday. Pt and pt's niece worried by systolic of 200s this AM so came to ED.

## 2022-03-12 NOTE — ED PROVIDER NOTES
Encounter Date: 3/12/2022       History     Chief Complaint   Patient presents with    Hypertension     Prescribed 3 BP meds but has only been 1, + headache and +SOB     89 y/o f, h/o HTN, CAD, fibromyalgia, BIB relative 2/2 concerns about elevated BP.  Pt with known HTN, has been some confusion recently about what rx'd meds she's supposed to be taking, previously on spironolactone, amlodipine and lisinopril - but stopped taking spironolactone and amlodipine recently.  Seen by cards yesterday - recommending continuing lisinopril, BP log over the weekend and then plan for recheck next week to determine which agents pt should be on.  Overnight/this am BP elevated at home so presenting to the ED for assessment.  Pt reports taking amlodipine 5 mg overnight and then lisinopril 20 mg this morning.  She has a HA on the top of her head that's been present for 2 weeks without acute change in character.  No n/v, no vision changes, no neck pain, no fever/chills.  Pt with BMP and CBC yesterday that were normal.    The history is provided by the patient and a relative.     Review of patient's allergies indicates:   Allergen Reactions    Aspirin      Other reaction(s): HEADACHES    Citalopram      tachycardia    Codeine Other (See Comments)     Dizziness    Cytotec [misoprostol] Other (See Comments)     Unknown      Feldene [piroxicam] Other (See Comments)     unknown    Gentamicin Other (See Comments)     Flushing      Indocin [indomethacin sodium] Other (See Comments)     Unknown      Motrin [ibuprofen] Nausea And Vomiting    Paxil [paroxetine hcl] Other (See Comments)     Suicidal feelings    Prozac [fluoxetine] Other (See Comments)     Suicidal feeling    Tofranil [imipramine hcl] Other (See Comments)     Feeling restless    Vancomycin analogues Other (See Comments)     Unknown      Elavil [amitriptyline] Palpitations    Penicillins Rash     Other reaction(s): RASH     Past Medical History:   Diagnosis Date     Carotid artery disease     Cervical spondylosis without myelopathy 4/28/2014    Fibromyalgia     Fracture of wrist     Fracture, ankle     both at diff times    Glaucoma     Hyperparathyroidism     Hypothyroidism     Osteoporosis, post-menopausal     Unspecified vitamin D deficiency 10/30/2012     Past Surgical History:   Procedure Laterality Date    APPENDECTOMY      Bilateral Levator Repair  7/03    [facility] (Sangeeta Still)    CARDIAC PACEMAKER PLACEMENT      carotid endarterectomy      CATARACT EXTRACTION W/  INTRAOCULAR LENS IMPLANT Bilateral 1990's        CATARACT EXTRACTION W/ INTRAOCULAR LENS IMPLANTW/ TRABECULECTOMY Right 03/1994    [facility] ( Dr. Ott)    CORONARY ANGIOPLASTY WITH STENT PLACEMENT      ESOPHAGOGASTRODUODENOSCOPY N/A 5/31/2021    Procedure: EGD (ESOPHAGOGASTRODUODENOSCOPY);  Surgeon: Brooks Sanchez MD;  Location: [facility] ([address] [place] [address]);  Service: Endoscopy;  Laterality: N/A;    HYSTERECTOMY      Ptosis Revision Left 10/2003    [facility] (Dr. Ott)    RETINAL DETACHMENT SURGERY  2/02    [facility] (Dr. Mckeon)    thyroid nodule      WRIST SURGERY       Family History   Problem Relation Age of Onset    Goiter Mother     Urolithiasis Father     Retinal detachment Brother     Glaucoma Maternal Grandfather     Amblyopia Neg Hx     Blindness Neg Hx     Diabetes Neg Hx     Macular degeneration Neg Hx     Strabismus Neg Hx     Cataracts Neg Hx      Social History     Tobacco Use    Smoking status: Never Smoker    Smokeless tobacco: Never Used   Substance Use Topics    Alcohol use: No     Review of Systems   Constitutional: Negative for fever.   Eyes: Negative for photophobia and visual disturbance.   Gastrointestinal: Negative for nausea and vomiting.   Musculoskeletal: Negative for neck pain.   Neurological: Positive for headaches. Negative for dizziness, tremors, seizures, syncope, speech difficulty, weakness and numbness.   All other systems reviewed and are negative.      Physical  Exam     Initial Vitals [03/12/22 1010]   BP Pulse Resp Temp SpO2   (!) 203/94 100 20 97.8 °F (36.6 °C) 98 %      MAP       --         Physical Exam    Nursing note and vitals reviewed.  Constitutional: Vital signs are normal. She appears well-developed and well-nourished. She is not diaphoretic.  Non-toxic appearance. She does not appear ill. No distress.   HENT:   Head: Normocephalic and atraumatic.   Mouth/Throat: Mucous membranes are normal. Mucous membranes are not dry.   Eyes: Conjunctivae and lids are normal.   Neck: Neck supple.   Normal range of motion.  Cardiovascular: Normal rate.   Musculoskeletal:      Cervical back: Normal range of motion and neck supple.     Neurological: She is alert.   Skin: Skin is dry and intact. No pallor.   Psychiatric: She has a normal mood and affect. Her speech is normal and behavior is normal.         ED Course   Procedures  Labs Reviewed - No data to display       Imaging Results          CT Head Without Contrast (Final result)  Result time 03/12/22 11:10:54    Final result by Kayode Valiente DO (03/12/22 11:10:54)                 Impression:      No acute intracranial abnormality.      Electronically signed by: Kayode Valiente  Date:    03/12/2022  Time:    11:10             Narrative:    EXAMINATION:  CT HEAD WITHOUT CONTRAST    CLINICAL HISTORY:  Headache, new or worsening (Age >= 50y);    TECHNIQUE:  Low dose axial CT images obtained throughout the head without intravenous contrast. Sagittal and coronal reconstructions were performed.    COMPARISON:  06/03/2011.    FINDINGS:  There is age-related brain parenchymal volume loss and prominence of the CSF spaces.  No hydrocephalus.    There are no extra-axial blood or fluid collections.    The brain parenchyma demonstrates mild hypoattenuation in the supratentorial white matter, nonspecific but can be seen with chronic microvascular ischemic changes.  No parenchymal mass, hematoma, edema, or CT evidence of an acute large  territory infarction.  Incidentally noted pineal calcification.  There are atherosclerotic calcifications of the intracranial arteries.    No calvarial fracture.  The scalp is unremarkable.  Bilateral paranasal sinuses and mastoid air cells are clear.  Bilateral prosthetic lenses noted.                                 Medications   acetaminophen tablet 1,000 mg (1,000 mg Oral Given 3/12/22 1035)     Medical Decision Making:   History:   Old Medical Records: I decided to obtain old medical records.  Initial Assessment:   Well-appearing  comfortable  Differential Diagnosis:   Elevated BP in the setting of known HTN.  Has had a HA for 2 weeks so unlikely that HA related to acute BP elevation.  Suspect that pt has asx HTN.  Per most recent [facility] clinical policy, there is no indication for an emergent work-up or treatment in the ED.  The risks of emergently lowering of the pt's BP (iatrogenic stroke/hypotension) would outweigh any potential benefits.       Will get CT head given HA for 2 weeks and advanced age but if this is ok,   Pt to f/u in outpatient setting with PCP for further management of HTN.  Pt to be given strict return precautions.  Advised to continue amlodipine and lisinopril in the meantime.      Clinical Tests:   Radiological Study: Ordered and Reviewed                      Clinical Impression:   Final diagnoses:  [I10] Primary hypertension (Primary)          ED Disposition Condition    Discharge Stable        ED Prescriptions     None        Follow-up Information     Follow up With Specialties Details Why Contact Info    Ricardo Veliz Jr., MD Cardiology Call in 2 days  [address] [facility] [address]  [phone]             Apurva Abrams MD  03/12/22 0576

## 2022-03-14 DIAGNOSIS — I10 PRIMARY HYPERTENSION: Primary | ICD-10-CM

## 2022-03-14 RX ORDER — FUROSEMIDE 20 MG/1
20 TABLET ORAL DAILY
Qty: 30 TABLET | Refills: 11 | Status: ON HOLD | OUTPATIENT
Start: 2022-03-14 | End: 2023-03-23

## 2022-03-21 ENCOUNTER — PATIENT MESSAGE (OUTPATIENT)
Dept: CARDIOLOGY | Facility: CLINIC | Age: 87
End: 2022-03-21
Payer: MEDICARE

## 2022-03-29 ENCOUNTER — PATIENT MESSAGE (OUTPATIENT)
Dept: INTERNAL MEDICINE | Facility: CLINIC | Age: 87
End: 2022-03-29
Payer: MEDICARE

## 2022-03-29 DIAGNOSIS — F41.9 ANXIETY: ICD-10-CM

## 2022-03-29 RX ORDER — ALPRAZOLAM 0.25 MG/1
0.25 TABLET ORAL NIGHTLY
Qty: 30 TABLET | Refills: 1 | Status: SHIPPED | OUTPATIENT
Start: 2022-03-29 | End: 2022-07-28 | Stop reason: SDUPTHER

## 2022-03-29 NOTE — TELEPHONE ENCOUNTER
Care Due:                  Date            Visit Type   Department     Provider  --------------------------------------------------------------------------------                                ESTABLISHED                              PATIENT -    [facility]  Last Visit: 04-      [facility]      CARE           Stiven Mcclain  Next Visit: None Scheduled  None         None Found                                                            Last  Test          Frequency    Reason                     Performed    Due Date  --------------------------------------------------------------------------------    Office Visit  12 months..  levothyroxine............  04- 04-    TSH.........  12 months..  levothyroxine............  02- 02-    Powered by [employer]. Reference number: [id number].   3/29/2022 10:52:56 AM CDESTHER

## 2022-04-12 ENCOUNTER — LAB VISIT (OUTPATIENT)
Dept: LAB | Facility: HOSPITAL | Age: 87
End: 2022-04-12
Attending: INTERNAL MEDICINE
Payer: MEDICARE

## 2022-04-12 DIAGNOSIS — I10 PRIMARY HYPERTENSION: ICD-10-CM

## 2022-04-12 LAB
ANION GAP SERPL CALC-SCNC: 10 MMOL/L (ref 8–16)
BUN SERPL-MCNC: 23 MG/DL (ref 10–30)
CALCIUM SERPL-MCNC: 9.7 MG/DL (ref 8.7–10.5)
CHLORIDE SERPL-SCNC: 100 MMOL/L (ref 95–110)
CO2 SERPL-SCNC: 23 MMOL/L (ref 23–29)
CREAT SERPL-MCNC: 1.2 MG/DL (ref 0.5–1.4)
EST. GFR  (AFRICAN AMERICAN): 43.5 ML/MIN/1.73 M^2
EST. GFR  (NON AFRICAN AMERICAN): 37.7 ML/MIN/1.73 M^2
GLUCOSE SERPL-MCNC: 91 MG/DL (ref 70–110)
POTASSIUM SERPL-SCNC: 4.6 MMOL/L (ref 3.5–5.1)
SODIUM SERPL-SCNC: 133 MMOL/L (ref 136–145)

## 2022-04-12 PROCEDURE — 80048 BASIC METABOLIC PNL TOTAL CA: CPT | Performed by: INTERNAL MEDICINE

## 2022-04-12 PROCEDURE — 36415 COLL VENOUS BLD VENIPUNCTURE: CPT | Mod: PO | Performed by: INTERNAL MEDICINE

## 2022-04-26 ENCOUNTER — CLINICAL SUPPORT (OUTPATIENT)
Dept: CARDIOLOGY | Facility: HOSPITAL | Age: 87
End: 2022-04-26
Attending: INTERNAL MEDICINE
Payer: MEDICARE

## 2022-04-26 DIAGNOSIS — I44.2 ATRIOVENTRICULAR BLOCK, COMPLETE: ICD-10-CM

## 2022-04-26 DIAGNOSIS — Z95.0 PRESENCE OF CARDIAC PACEMAKER: ICD-10-CM

## 2022-04-26 PROCEDURE — 93296 REM INTERROG EVL PM/IDS: CPT | Performed by: INTERNAL MEDICINE

## 2022-05-21 NOTE — TELEPHONE ENCOUNTER
No new care gaps identified.  [facility] Embedded Care Gaps. Reference number: [id number]. 5/21/2022   1:33:38 PM CDT

## 2022-05-21 NOTE — TELEPHONE ENCOUNTER
Refill Routing Note   Medication(s) are not appropriate for processing by [facility] for the following reason(s):      - Patient has been seen in the ED/Hospital since the last PCP visit  - Drug-Disease Interaction (OSTEOPOROSIS)  - Required indication for medication not on problem list (GERD)    [facility] action(s):  Defer          Medication reconciliation completed: No     Appointments  past 12m or future 3m with PCP    Date Provider   Last Visit   4/9/2021 Stiven Ellis MD   Next Visit   Visit date not found Stiven Ellis MD   ED visits in past 90 days: 1        Note composed:4:07 PM 05/21/2022

## 2022-05-23 RX ORDER — PANTOPRAZOLE SODIUM 40 MG/1
TABLET, DELAYED RELEASE ORAL
Qty: 90 TABLET | Refills: 2 | OUTPATIENT
Start: 2022-05-23

## 2022-05-23 NOTE — TELEPHONE ENCOUNTER
Does pt have an upcoming appt with me?    Future Appointments   Date Time Provider Department Center   7/25/2022 10:00 AM HOME MONITOR DEVICE CHECK, WHITNEY Jung

## 2022-05-23 NOTE — TELEPHONE ENCOUNTER
No new care gaps identified.  [facility] Embedded Care Gaps. Reference number: [id number]. 5/23/2022   5:56:28 PM CDT

## 2022-05-24 RX ORDER — GABAPENTIN 100 MG/1
CAPSULE ORAL
Qty: 180 CAPSULE | Refills: 1 | Status: ON HOLD | OUTPATIENT
Start: 2022-05-24 | End: 2023-03-27 | Stop reason: SDUPTHER

## 2022-05-24 NOTE — TELEPHONE ENCOUNTER
Provider Staff:     Action required for this patient.    Please note Refusal of medication.            Requested Prescriptions     Refused Prescriptions Disp Refills    pantoprazole (PROTONIX) 40 MG tablet [Pharmacy Med Name: PANTOPRAZOLE SODIUM 40 MG Tablet Delayed Release] 90 tablet 2     Sig: TAKE 1 TABLET EVERY DAY     Refused By: EDER MORRISON     Reason for Refusal: Patient needs an appointment      Thanks!  [facility]   Note composed: 05/24/2022 2:19 PM

## 2022-07-19 ENCOUNTER — PATIENT MESSAGE (OUTPATIENT)
Dept: RESEARCH | Facility: CLINIC | Age: 87
End: 2022-07-19
Payer: MEDICARE

## 2022-07-25 ENCOUNTER — CLINICAL SUPPORT (OUTPATIENT)
Dept: CARDIOLOGY | Facility: HOSPITAL | Age: 87
End: 2022-07-25
Payer: MEDICARE

## 2022-07-25 DIAGNOSIS — Z95.0 PRESENCE OF CARDIAC PACEMAKER: ICD-10-CM

## 2022-07-25 PROCEDURE — 93294 CARDIAC DEVICE CHECK - REMOTE: ICD-10-PCS | Mod: ,,, | Performed by: INTERNAL MEDICINE

## 2022-07-25 PROCEDURE — 93294 REM INTERROG EVL PM/LDLS PM: CPT | Mod: ,,, | Performed by: INTERNAL MEDICINE

## 2022-07-25 PROCEDURE — 93296 REM INTERROG EVL PM/IDS: CPT | Performed by: INTERNAL MEDICINE

## 2022-07-26 DIAGNOSIS — F41.9 ANXIETY: ICD-10-CM

## 2022-07-26 RX ORDER — ALPRAZOLAM 0.25 MG/1
TABLET ORAL
Refills: 0 | OUTPATIENT
Start: 2022-07-26

## 2022-07-26 NOTE — TELEPHONE ENCOUNTER
Care Due:                  Date            Visit Type   Department     Provider  --------------------------------------------------------------------------------                                ESTABLISHED                              PATIENT -    [facility]  Last Visit: 04-      [facility]           Stiven Mcclain  Next Visit: None Scheduled  None         None Found                                                            Last  Test          Frequency    Reason                     Performed    Due Date  --------------------------------------------------------------------------------    Office Visit  12 months..  levothyroxine............  04- 04-    TSH.........  12 months..  levothyroxine............  02- 02-    [employer] Embedded Care Gaps. Reference number: [id number]. 7/26/2022   1:34:16 PM CDT

## 2022-07-28 ENCOUNTER — PATIENT MESSAGE (OUTPATIENT)
Dept: INTERNAL MEDICINE | Facility: CLINIC | Age: 87
End: 2022-07-28
Payer: MEDICARE

## 2022-07-28 DIAGNOSIS — F41.9 ANXIETY: ICD-10-CM

## 2022-07-28 RX ORDER — ALPRAZOLAM 0.25 MG/1
0.25 TABLET ORAL NIGHTLY PRN
Qty: 30 TABLET | Refills: 0 | Status: SHIPPED | OUTPATIENT
Start: 2022-07-28 | End: 2022-09-09

## 2022-07-28 NOTE — TELEPHONE ENCOUNTER
No new care gaps identified.  [facility] Embedded Care Gaps. Reference number: [id number]. 7/28/2022   1:50:38 PM CDT

## 2022-08-01 RX ORDER — PANTOPRAZOLE SODIUM 40 MG/1
40 TABLET, DELAYED RELEASE ORAL DAILY
Qty: 90 TABLET | Refills: 2 | Status: SHIPPED | OUTPATIENT
Start: 2022-08-01 | End: 2023-03-23

## 2022-08-01 NOTE — TELEPHONE ENCOUNTER
No new care gaps identified.  [facility] Embedded Care Gaps. Reference number: [id number]. 8/01/2022   11:59:02 AM ASHOKT

## 2022-10-27 ENCOUNTER — PATIENT MESSAGE (OUTPATIENT)
Dept: CARDIOLOGY | Facility: CLINIC | Age: 87
End: 2022-10-27
Payer: MEDICARE

## 2022-10-27 ENCOUNTER — PATIENT MESSAGE (OUTPATIENT)
Dept: INTERNAL MEDICINE | Facility: CLINIC | Age: 87
End: 2022-10-27
Payer: MEDICARE

## 2022-10-27 DIAGNOSIS — I25.10 CORONARY ARTERY DISEASE INVOLVING NATIVE CORONARY ARTERY OF NATIVE HEART WITHOUT ANGINA PECTORIS: Chronic | ICD-10-CM

## 2022-10-27 DIAGNOSIS — I11.9 HYPERTENSIVE HEART DISEASE WITHOUT HEART FAILURE: Primary | ICD-10-CM

## 2022-10-27 DIAGNOSIS — Z95.0 PACEMAKER: Chronic | ICD-10-CM

## 2022-10-28 ENCOUNTER — PATIENT MESSAGE (OUTPATIENT)
Dept: CARDIOLOGY | Facility: CLINIC | Age: 87
End: 2022-10-28
Payer: MEDICARE

## 2022-10-28 NOTE — TELEPHONE ENCOUNTER
I'm not sure what they're asking for but I placed a referral to the [facility] which sends a primary care nurse practitioner to the house to continue care (in place of traditional outpatient primary care practices).

## 2022-11-03 ENCOUNTER — PES CALL (OUTPATIENT)
Dept: ADMINISTRATIVE | Facility: CLINIC | Age: 87
End: 2022-11-03
Payer: MEDICARE

## 2022-11-04 ENCOUNTER — PES CALL (OUTPATIENT)
Dept: ADMINISTRATIVE | Facility: CLINIC | Age: 87
End: 2022-11-04
Payer: MEDICARE

## 2022-11-07 ENCOUNTER — PES CALL (OUTPATIENT)
Dept: ADMINISTRATIVE | Facility: CLINIC | Age: 87
End: 2022-11-07
Payer: MEDICARE

## 2022-11-07 ENCOUNTER — NURSE TRIAGE (OUTPATIENT)
Dept: ADMINISTRATIVE | Facility: CLINIC | Age: 87
End: 2022-11-07
Payer: MEDICARE

## 2022-11-07 ENCOUNTER — TELEPHONE (OUTPATIENT)
Dept: INTERNAL MEDICINE | Facility: CLINIC | Age: 87
End: 2022-11-07
Payer: MEDICARE

## 2022-11-07 ENCOUNTER — PATIENT MESSAGE (OUTPATIENT)
Dept: CARDIOLOGY | Facility: CLINIC | Age: 87
End: 2022-11-07
Payer: MEDICARE

## 2022-11-07 ENCOUNTER — PATIENT MESSAGE (OUTPATIENT)
Dept: INTERNAL MEDICINE | Facility: CLINIC | Age: 87
End: 2022-11-07
Payer: MEDICARE

## 2022-11-07 NOTE — TELEPHONE ENCOUNTER
Spoke with patient states she has pain and a burning sensation in the left arm.  Patient states her pain started last night.  She states it is a constant ache. Pain is present even when she is not moving her arm.   Advised patient to go to ER per protocol. Patient declined advise.  Further advised and patient further declined.    Reason for Disposition   Age > 40 and no obvious cause for pain, pain still present even when not moving the arm    Additional Information   Negative: Shock suspected (e.g., cold/pale/clammy skin, too weak to stand, low BP, rapid pulse)   Negative: Similar pain previously and it was from 'heart attack'   Negative: Similar pain previously from 'angina' and not relieved by nitroglycerin   Negative: Sounds like a life-threatening emergency to the triager   Negative: Difficulty breathing or unusual sweating (e.g., sweating without exertion)   Negative: Chest pain lasting longer than 5 minutes    Protocols used: Arm Pain-A-[place]

## 2022-11-07 NOTE — TELEPHONE ENCOUNTER
----- Message from Amy Vasquez sent at 11/7/2022  3:23 PM CST -----  Contact: [phone]  Pt said her left arm started hurting her last night and it still hurts. Please call pt back.

## 2022-11-08 ENCOUNTER — CARE AT HOME (OUTPATIENT)
Dept: HOME HEALTH SERVICES | Facility: CLINIC | Age: 87
End: 2022-11-08
Payer: MEDICARE

## 2022-11-08 DIAGNOSIS — I11.9 HYPERTENSIVE HEART DISEASE WITHOUT HEART FAILURE: ICD-10-CM

## 2022-11-08 DIAGNOSIS — Z95.0 PACEMAKER: Chronic | ICD-10-CM

## 2022-11-08 DIAGNOSIS — I25.10 CORONARY ARTERY DISEASE INVOLVING NATIVE CORONARY ARTERY OF NATIVE HEART WITHOUT ANGINA PECTORIS: Chronic | ICD-10-CM

## 2022-11-08 PROCEDURE — 99349 PR HOME VISIT,ESTAB PATIENT,LEVEL III: ICD-10-PCS | Mod: S$GLB,,, | Performed by: NURSE PRACTITIONER

## 2022-11-08 PROCEDURE — 99497 ADVNCD CARE PLAN 30 MIN: CPT | Mod: S$GLB,,, | Performed by: NURSE PRACTITIONER

## 2022-11-08 PROCEDURE — 99349 HOME/RES VST EST MOD MDM 40: CPT | Mod: S$GLB,,, | Performed by: NURSE PRACTITIONER

## 2022-11-08 PROCEDURE — 99497 PR ADVNCD CARE PLAN 30 MIN: ICD-10-PCS | Mod: S$GLB,,, | Performed by: NURSE PRACTITIONER

## 2022-11-12 ENCOUNTER — CLINICAL SUPPORT (OUTPATIENT)
Dept: CARDIOLOGY | Facility: HOSPITAL | Age: 87
End: 2022-11-12
Payer: MEDICARE

## 2022-11-12 ENCOUNTER — PATIENT MESSAGE (OUTPATIENT)
Dept: CARDIOLOGY | Facility: CLINIC | Age: 87
End: 2022-11-12

## 2022-11-12 DIAGNOSIS — Z95.0 PRESENCE OF CARDIAC PACEMAKER: ICD-10-CM

## 2022-11-12 DIAGNOSIS — I44.2 ATRIOVENTRICULAR BLOCK, COMPLETE: ICD-10-CM

## 2022-11-12 PROCEDURE — 93296 REM INTERROG EVL PM/IDS: CPT | Performed by: INTERNAL MEDICINE

## 2022-11-12 PROCEDURE — 93294 REM INTERROG EVL PM/LDLS PM: CPT | Mod: ,,, | Performed by: INTERNAL MEDICINE

## 2022-11-12 PROCEDURE — 93294 CARDIAC DEVICE CHECK - REMOTE: ICD-10-PCS | Mod: ,,, | Performed by: INTERNAL MEDICINE

## 2022-11-15 DIAGNOSIS — E03.9 ACQUIRED HYPOTHYROIDISM: ICD-10-CM

## 2022-11-15 NOTE — TELEPHONE ENCOUNTER
Care Due:                  Date            Visit Type   Department     Provider  --------------------------------------------------------------------------------                                ESTABLISHED                              PATIENT -    [facility]  Last Visit: 04-      [facility]           Stiven Mcclain  Next Visit: None Scheduled  None         None Found                                                            Last  Test          Frequency    Reason                     Performed    Due Date  --------------------------------------------------------------------------------    Office Visit  12 months..  levothyroxine,             04- 04-                             pantoprazole.............    TSH.........  12 months..  levothyroxine............  02- 02-    [facility] Embedded Care Gaps. Reference number: [id number]. 11/15/2022   1:16:26 PM CST

## 2022-11-15 NOTE — TELEPHONE ENCOUNTER
Does pt have an upcoming appt with me?    Future Appointments   Date Time Provider Department Center   12/2/2022 11:30 AM Ricardo Veliz Jr., MD [facility] CARDIO [place]   12/7/2022  8:00 AM Lilia Timmons, NP [address]   2/10/2023 10:00 AM HOME MONITOR DEVICE CHECK, EPIFANIO WHITNEY Jung

## 2022-11-16 ENCOUNTER — TELEPHONE (OUTPATIENT)
Dept: CARDIOLOGY | Facility: CLINIC | Age: 87
End: 2022-11-16
Payer: MEDICARE

## 2022-11-16 RX ORDER — LEVOTHYROXINE SODIUM 50 UG/1
TABLET ORAL
Qty: 90 TABLET | Refills: 3 | OUTPATIENT
Start: 2022-11-16

## 2022-11-17 ENCOUNTER — PATIENT MESSAGE (OUTPATIENT)
Dept: INTERNAL MEDICINE | Facility: CLINIC | Age: 87
End: 2022-11-17
Payer: MEDICARE

## 2022-11-17 ENCOUNTER — PATIENT MESSAGE (OUTPATIENT)
Dept: HOME HEALTH SERVICES | Facility: CLINIC | Age: 87
End: 2022-11-17
Payer: MEDICARE

## 2022-11-21 ENCOUNTER — PATIENT MESSAGE (OUTPATIENT)
Dept: INTERNAL MEDICINE | Facility: CLINIC | Age: 87
End: 2022-11-21
Payer: MEDICARE

## 2022-11-22 ENCOUNTER — PATIENT MESSAGE (OUTPATIENT)
Dept: INTERNAL MEDICINE | Facility: CLINIC | Age: 87
End: 2022-11-22
Payer: MEDICARE

## 2022-11-22 DIAGNOSIS — F41.9 ANXIETY: ICD-10-CM

## 2022-11-23 ENCOUNTER — PATIENT MESSAGE (OUTPATIENT)
Dept: HOME HEALTH SERVICES | Facility: CLINIC | Age: 87
End: 2022-11-23
Payer: MEDICARE

## 2022-11-23 RX ORDER — ALPRAZOLAM 0.25 MG/1
0.25 TABLET ORAL DAILY PRN
Qty: 30 TABLET | Refills: 0 | Status: SHIPPED | OUTPATIENT
Start: 2022-11-23 | End: 2023-01-02 | Stop reason: SDUPTHER

## 2022-11-29 VITALS
OXYGEN SATURATION: 97 % | RESPIRATION RATE: 18 BRPM | HEIGHT: 62 IN | HEART RATE: 68 BPM | WEIGHT: 100 LBS | BODY MASS INDEX: 18.4 KG/M2 | TEMPERATURE: 98 F

## 2022-11-30 NOTE — PATIENT INSTRUCTIONS
Instructions:  - [facility] Nurse Practitioner to schedule home follow-up visit with patient in 4-6 weeks or as needed.  - Continue all medications, treatments and therapies as ordered.   - Follow all instructions, recommendations as discussed.  - Maintain Safety Precautions at all times.  - Attend all medical appointments as scheduled.  - For worsening symptoms: call Primary Care Physician or Nurse Practitioner.  - For emergencies, call 911 or immediately report to the nearest emergency room.  - Limit Risks of environmental exposure to coronavirus/COVID-19 as discussed including: social distancing, hand hygiene, and limiting departures from the home for necessities only.

## 2022-11-30 NOTE — PROGRESS NOTES
[facility]  Medical Home Visit    Visit Date: 22  Encounter Provider: Lilia JOSEPH Chairs  PCP:  Stiven Ellis MD    Subjective:      Patient ID: Cat Bolivar is a 89 y.o. female.    Consult Requested By:  Dr. Stiven Ellis  Reason for Consult:  Establish Care    Cat is being seen at home due to being seen at home due to physical debility that presents a taxing effort to leave the home, to mitigate high risk of hospital readmission and/or due to the limited availability of reliable or safe options for transportation to the point of access to the provider. Prior to treatment on this visit the chart was reviewed and patient verbal consent was obtained.    Chief Complaint: Uncontrolled HTN      HPI  Cat Bolivar  is a  89 y/o F with a past medical history of CAD, Fibromyalgia, Glaucoma, Heart disease, HTN, HLD, Hyperthyroidism, Pacemaker. Currently ambulatory with cane, appears to be unsteady, I will order PT/OT to evaluate and treat.      With this visit today patient is found sitting up in her living room, she is home alone. Patient is AAOx3, able to verify her name and . Most of patients other history was received from her medical record. She currently does not have HH but I will order SN to assist with medication management and PT/OT to evaluate and treat as indicated. SHe sees Dr. Ellis as her PCP. I will continue seeing the patient in the home as it is difficult for her to physically make multiple visits. SHe endorses eating x 3 small meals per day. SHe endorses regular BMs and denies diarrhea or constipation.      Fall precautions reinforced. Education completed. Plan to follow up.     VSS. Denies fever, chest pain, shortness of breath, nausea, vomiting, diarrhea. Risks of environmental exposure to coronavirus discussed including: social distancing, hand hygiene, and limiting departures from the home for necessities only.  Reports understanding and willingness to comply.  All hospital  discharge orders reviewed and being followed, all medications reconciled and reviewed, patient and family verbalized understanding. No other needs identified at this time.     Has ACP on file.    Attestation: Screening criteria to assess the level of the patient's risk for infection with COVID-19 as recommended by the CDC at the time of the above documented home visit concluded appropriateness to proceed. Universal precautions were maintained at all times, including provider use of 60% alcohol gel hand  immediately prior to entry and upon departing the patient's home.    Review of Systems  Generalized weakness and fatigue.    PAST HISTORY:     Past Medical History:   Diagnosis Date    Carotid artery disease     Cervical spondylosis without myelopathy 4/28/2014    Fibromyalgia     Fracture of wrist     Fracture, ankle     both at diff times    Glaucoma     Hyperparathyroidism     Hypothyroidism     Osteoporosis, post-menopausal     Unspecified vitamin D deficiency 10/30/2012       Past Surgical History:   Procedure Laterality Date    APPENDECTOMY      Bilateral Levator Repair  7/03    [facility] (Sangeeta Still)    CARDIAC PACEMAKER PLACEMENT      carotid endarterectomy      CATARACT EXTRACTION W/  INTRAOCULAR LENS IMPLANT Bilateral 1990's        CATARACT EXTRACTION W/ INTRAOCULAR LENS IMPLANTW/ TRABECULECTOMY Right 03/1994    OD ( Dr. Ott)    CORONARY ANGIOPLASTY WITH STENT PLACEMENT      ESOPHAGOGASTRODUODENOSCOPY N/A 5/31/2021    Procedure: EGD (ESOPHAGOGASTRODUODENOSCOPY);  Surgeon: Brooks Sanchez MD;  Location: [facility] ([address] [place] [address]);  Service: Endoscopy;  Laterality: N/A;    HYSTERECTOMY      Ptosis Revision Left 10/2003    LLL (Dr. Ott)    RETINAL DETACHMENT SURGERY  2/02    OD (Dr. Mckeon)    thyroid nodule      WRIST SURGERY         Family History   Problem Relation Age of Onset    Goiter Mother     Urolithiasis Father     Retinal detachment Brother     Glaucoma Maternal Grandfather     Amblyopia Neg Hx      Blindness Neg Hx     Diabetes Neg Hx     Macular degeneration Neg Hx     Strabismus Neg Hx     Cataracts Neg Hx        Social History     Socioeconomic History    Marital status:    Tobacco Use    Smoking status: Never    Smokeless tobacco: Never   Substance and Sexual Activity    Alcohol use: No   Social History Narrative    Retired. No children. Her niece helps her with errands.       MEDICATIONS & ALLERGIES:     Current Outpatient Medications on File Prior to Visit   Medication Sig Dispense Refill    BIOTIN ORAL Take 1,000 mg by mouth once daily.      brimonidine 0.15 % OPTH DROP (ALPHAGAN) 0.15 % ophthalmic solution Place 1 drop into both eyes 2 (two) times a day. 4 mL 11    calcitonin, salmon, (FORTICAL) 200 unit/actuation nasal spray 1 spray by Nasal route once daily. 1 Bottle 0    clopidogreL (PLAVIX) 75 mg tablet TAKE 1 TABLET (75 MG TOTAL) BY MOUTH FOUR TIMES A WEEK. 52 tablet 3    coenzyme Q10 10 mg capsule Take 10 mg by mouth once daily.      cycloSPORINE (RESTASIS) 0.05 % ophthalmic emulsion Place 0.4 mLs (1 drop total) into both eyes 2 (two) times daily. 180 each 3    diclofenac sodium (VOLTAREN) 1 % Gel Apply 2 g topically once daily. (Patient taking differently: Apply 2 g topically as needed.) 100 g 11    dorzolamide (TRUSOPT) 2 % ophthalmic solution Place 1 drop into both eyes 3 (three) times daily. (Patient taking differently: Place 1 drop into both eyes 2 (two) times a day.) 30 mL 3    ergocalciferol, vitamin D2, (VITAMIN D ORAL) Take 50 mcg by mouth once daily.      ezetimibe (ZETIA) 10 mg tablet TAKE 1 TABLET (10 MG TOTAL) BY MOUTH EVERY EVENING. 90 tablet 3    fexofenadine (ALLEGRA) 60 MG tablet Take 60 mg by mouth as needed.       fluticasone (FLONASE) 50 mcg/actuation nasal spray 1 spray by Nasal route once daily.       furosemide (LASIX) 20 MG tablet Take 1 tablet (20 mg total) by mouth once daily. 30 tablet 11    gabapentin (NEURONTIN) 100 MG capsule TAKE 1 CAPSULE TWICE DAILY 180  "capsule 1    latanoprost 0.005 % ophthalmic solution INSTILL 1 DROP INTO BOTH EYES EVERY EVENING.  3 Bottle 3    LIDOcaine (LIDODERM) 5 % Place 1 patch onto the skin once daily. Remove & Discard patch within 12 hours or as directed by MD 30 patch 0    lisinopriL (PRINIVIL,ZESTRIL) 20 MG tablet TAKE 1 TABLET EVERY DAY 90 tablet 3    pantoprazole (PROTONIX) 40 MG tablet Take 1 tablet (40 mg total) by mouth once daily. 90 tablet 2     No current facility-administered medications on file prior to visit.        Review of patient's allergies indicates:   Allergen Reactions    Aspirin      Other reaction(s): HEADACHES    Citalopram      tachycardia    Codeine Other (See Comments)     Dizziness    Cytotec [misoprostol] Other (See Comments)     Unknown      Feldene [piroxicam] Other (See Comments)     unknown    Gentamicin Other (See Comments)     Flushing      Indocin [indomethacin sodium] Other (See Comments)     Unknown      Motrin [ibuprofen] Nausea And Vomiting    Paxil [paroxetine hcl] Other (See Comments)     Suicidal feelings    Prozac [fluoxetine] Other (See Comments)     Suicidal feeling    Tofranil [imipramine hcl] Other (See Comments)     Feeling restless    Vancomycin analogues Other (See Comments)     Unknown      Elavil [amitriptyline] Palpitations    Penicillins Rash     Other reaction(s): RASH         Assessments:  Environmental: Lives in single story with no steps to enter  Functional Status: min asst with adls, cane for mobility  Safety: fall precautions  Nutritional: heart healthy  Home Health/DME/Supplies: cane    Objective:       Vitals:    11/08/22 0922   Pulse: 68   Resp: 18   Temp: 97.6 °F (36.4 °C)   SpO2: 97%   Weight: 45.4 kg (100 lb)   Height: 5' 2" (1.575 m)     Body mass index is 18.29 kg/m².    Assessment:     1. Hypertensive heart disease without heart failure    2. Coronary artery disease involving native coronary artery of native heart without angina pectoris    3. Pacemaker        Plan: "     Ethical / Legal: Advance Care Planning   See ACP on file       1. Hypertensive heart disease without heart failure  -     Ambulatory referral/consult to [facility]  -     Ambulatory referral/consult to Home Health; Future; Expected date: 12/06/2022    2. Coronary artery disease involving native coronary artery of native heart without angina pectoris  -     Ambulatory referral/consult to [facility]  -     Ambulatory referral/consult to Home Health; Future; Expected date: 12/06/2022    3. Pacemaker  -     Ambulatory referral/consult to [facility] - [facility]  -     Ambulatory referral/consult to Home Health; Future; Expected date: 12/06/2022       Were controlled substances prescribed?  No    Patient Instructions Given:  - Continue all medications, treatments and therapies as ordered.   - Follow all instructions, recommendations as discussed.  - Maintain Safety Precautions at all times.  - Attend all medical appointments as scheduled.  - For worsening symptoms: call Primary Care Physician or Nurse Practitioner.  - For emergencies, call 911 or immediately report to the nearest emergency room.    After Visit Medication List :     Medication List            Accurate as of November 8, 2022 11:59 PM. If you have any questions, ask your nurse or doctor.                CHANGE how you take these medications      diclofenac sodium 1 % Gel  Commonly known as: VOLTAREN  Apply 2 g topically once daily.  What changed:   when to take this  reasons to take this     dorzolamide 2 % ophthalmic solution  Commonly known as: TRUSOPT  Place 1 drop into both eyes 3 (three) times daily.  What changed: when to take this            CONTINUE taking these medications      ALPRAZolam 0.25 MG tablet  Commonly known as: XANAX  TAKE ONE TABLET BY MOUTH AT BEDTIME AS NEEDED FOR INSOMNIA OR ANXIETY     BIOTIN ORAL     clopidogreL 75 mg tablet  Commonly known  as: PLAVIX  TAKE 1 TABLET (75 MG TOTAL) BY MOUTH FOUR TIMES A WEEK.     coenzyme Q10 10 mg capsule     ezetimibe 10 mg tablet  Commonly known as: ZETIA  TAKE 1 TABLET (10 MG TOTAL) BY MOUTH EVERY EVENING.     fexofenadine 60 MG tablet  Commonly known as: ALLEGRA     fluticasone propionate 50 mcg/actuation nasal spray  Commonly known as: FLONASE     furosemide 20 MG tablet  Commonly known as: LASIX  Take 1 tablet (20 mg total) by mouth once daily.     gabapentin 100 MG capsule  Commonly known as: NEURONTIN  TAKE 1 CAPSULE TWICE DAILY     latanoprost 0.005 % ophthalmic solution  INSTILL 1 DROP INTO BOTH EYES EVERY EVENING.     levothyroxine 50 MCG tablet  Commonly known as: SYNTHROID  Take 1 tablet (50 mcg total) by mouth before breakfast.     LIDOcaine 5 %  Commonly known as: LIDODERM  Place 1 patch onto the skin once daily. Remove & Discard patch within 12 hours or as directed by MD     lisinopriL 20 MG tablet  Commonly known as: PRINIVIL,ZESTRIL  TAKE 1 TABLET EVERY DAY     VITAMIN D ORAL            Future Appointments   Date Time Provider Department Center   12/7/2022  8:00 AM Lilia Timmons, NP [facility] [place]   2/10/2023 10:00 AM HOME MONITOR DEVICE CHECK, [facility] NORBERTO Jung     Risks of environmental exposure to coronavirus discussed including: social distancing, hand hygiene, and limiting departures from the home for necessities only. Reports understanding and willingness to comply.     Signature:    Lilia Timmons, MSN, APRN, FNP-C  [facility]

## 2022-12-07 ENCOUNTER — CARE AT HOME (OUTPATIENT)
Dept: HOME HEALTH SERVICES | Facility: CLINIC | Age: 87
End: 2022-12-07
Payer: MEDICARE

## 2022-12-07 DIAGNOSIS — F02.82 ALZHEIMER'S DEMENTIA WITH PSYCHOTIC DISTURBANCE, UNSPECIFIED DEMENTIA SEVERITY, UNSPECIFIED TIMING OF DEMENTIA ONSET: Primary | ICD-10-CM

## 2022-12-07 DIAGNOSIS — G30.9 ALZHEIMER'S DEMENTIA WITH PSYCHOTIC DISTURBANCE, UNSPECIFIED DEMENTIA SEVERITY, UNSPECIFIED TIMING OF DEMENTIA ONSET: Primary | ICD-10-CM

## 2022-12-07 PROCEDURE — 99349 PR HOME VISIT,ESTAB PATIENT,LEVEL III: ICD-10-PCS | Mod: S$GLB,,, | Performed by: NURSE PRACTITIONER

## 2022-12-07 PROCEDURE — 99349 HOME/RES VST EST MOD MDM 40: CPT | Mod: S$GLB,,, | Performed by: NURSE PRACTITIONER

## 2022-12-27 ENCOUNTER — PATIENT MESSAGE (OUTPATIENT)
Dept: HOME HEALTH SERVICES | Facility: CLINIC | Age: 87
End: 2022-12-27
Payer: MEDICARE

## 2022-12-28 VITALS
RESPIRATION RATE: 18 BRPM | HEIGHT: 62 IN | SYSTOLIC BLOOD PRESSURE: 144 MMHG | DIASTOLIC BLOOD PRESSURE: 70 MMHG | OXYGEN SATURATION: 97 % | BODY MASS INDEX: 18.4 KG/M2 | TEMPERATURE: 98 F | WEIGHT: 100 LBS | HEART RATE: 74 BPM

## 2022-12-28 NOTE — PROGRESS NOTES
[facility]  Medical Home Visit    Visit Date: 22  Encounter Provider: Lilia Timmons  PCP:  Stiven Ellis MD    Subjective:      Patient ID: Cat Bolivar is a 89 y.o. female.    Consult Requested By:  No ref. provider found  Reason for Consult:  Establish Care    Cat is being seen at home due to being seen at home due to physical debility that presents a taxing effort to leave the home, to mitigate high risk of hospital readmission and/or due to the limited availability of reliable or safe options for transportation to the point of access to the provider. Prior to treatment on this visit the chart was reviewed and patient verbal consent was obtained.    Chief Complaint: Uncontrolled HTN      HPI  Cat Bolivar  is a  89 y/o F with a past medical history of CAD, Fibromyalgia, Glaucoma, Heart disease, HTN, HLD, Hyperthyroidism, Pacemaker. Currently ambulatory with cane, appears to be unsteady, I will order PT/OT to evaluate and treat as patient reports difficulty ambulating with her cane.      With this visit today patient is found sitting up in her living room, she is home alone and answered the door with her cane. Patient is AAOx3, able to verify her name and . Most of patients other history was received from her medical record. I placed a call to Lyssa, her next of kin but did not get an answer, I left a voicemail with my direct number for questions. She currently has  HH I ordered SN to assist with medication management and PT/OT to evaluate and treat as indicated. SHe sees Dr. Ellis as her PCP. SHe endorses eating x 3 small meals per day. SHe endorses regular BMs and denies diarrhea or constipation.      Fall precautions reinforced. Education completed. Plan to follow up.     VSS. Denies fever, chest pain, shortness of breath, nausea, vomiting, diarrhea. Risks of environmental exposure to coronavirus discussed including: social distancing, hand hygiene, and limiting departures from the home  for necessities only.  Reports understanding and willingness to comply.  All hospital discharge orders reviewed and being followed, all medications reconciled and reviewed, patient and family verbalized understanding. No other needs identified at this time.     Has ACP on file.    Attestation: Screening criteria to assess the level of the patient's risk for infection with COVID-19 as recommended by the CDC at the time of the above documented home visit concluded appropriateness to proceed. Universal precautions were maintained at all times, including provider use of 60% alcohol gel hand  immediately prior to entry and upon departing the patient's home.    Review of Systems  Generalized weakness and fatigue.    PAST HISTORY:     Past Medical History:   Diagnosis Date    Carotid artery disease     Cervical spondylosis without myelopathy 4/28/2014    Fibromyalgia     Fracture of wrist     Fracture, ankle     both at diff times    Glaucoma     Hyperparathyroidism     Hypothyroidism     Osteoporosis, post-menopausal     Unspecified vitamin D deficiency 10/30/2012       Past Surgical History:   Procedure Laterality Date    APPENDECTOMY      Bilateral Levator Repair  7/03    [facility] (Sangeeta Still)    CARDIAC PACEMAKER PLACEMENT      carotid endarterectomy      CATARACT EXTRACTION W/  INTRAOCULAR LENS IMPLANT Bilateral 1990's        CATARACT EXTRACTION W/ INTRAOCULAR LENS IMPLANTW/ TRABECULECTOMY Right 03/1994    OD ( Dr. Ott)    CORONARY ANGIOPLASTY WITH STENT PLACEMENT      ESOPHAGOGASTRODUODENOSCOPY N/A 5/31/2021    Procedure: EGD (ESOPHAGOGASTRODUODENOSCOPY);  Surgeon: Brooks Sanchez MD;  Location: [address]);  Service: Endoscopy;  Laterality: N/A;    HYSTERECTOMY      Ptosis Revision Left 10/2003    LLL (Dr. Ott)    RETINAL DETACHMENT SURGERY  2/02    OD (Dr. Mckeon)    thyroid nodule      WRIST SURGERY         Family History   Problem Relation Age of Onset    Goiter Mother     Urolithiasis Father      Retinal detachment Brother     Glaucoma Maternal Grandfather     Amblyopia Neg Hx     Blindness Neg Hx     Diabetes Neg Hx     Macular degeneration Neg Hx     Strabismus Neg Hx     Cataracts Neg Hx        Social History     Socioeconomic History    Marital status:    Tobacco Use    Smoking status: Never    Smokeless tobacco: Never   Substance and Sexual Activity    Alcohol use: No   Social History Narrative    Retired. No children. Her niece helps her with errands.       MEDICATIONS & ALLERGIES:     Current Outpatient Medications on File Prior to Visit   Medication Sig Dispense Refill    ALPRAZolam (XANAX) 0.25 MG tablet Take 1 tablet (0.25 mg total) by mouth daily as needed for Anxiety. 30 tablet 0    BIOTIN ORAL Take 1,000 mg by mouth once daily.      brimonidine 0.15 % OPTH DROP (ALPHAGAN) 0.15 % ophthalmic solution Place 1 drop into both eyes 2 (two) times a day. 4 mL 11    calcitonin, salmon, (FORTICAL) 200 unit/actuation nasal spray 1 spray by Nasal route once daily. 1 Bottle 0    clopidogreL (PLAVIX) 75 mg tablet TAKE 1 TABLET (75 MG TOTAL) BY MOUTH FOUR TIMES A WEEK. 52 tablet 3    coenzyme Q10 10 mg capsule Take 10 mg by mouth once daily.      cycloSPORINE (RESTASIS) 0.05 % ophthalmic emulsion Place 0.4 mLs (1 drop total) into both eyes 2 (two) times daily. 180 each 3    diclofenac sodium (VOLTAREN) 1 % Gel Apply 2 g topically once daily. (Patient taking differently: Apply 2 g topically as needed.) 100 g 11    dorzolamide (TRUSOPT) 2 % ophthalmic solution Place 1 drop into both eyes 3 (three) times daily. (Patient taking differently: Place 1 drop into both eyes 2 (two) times a day.) 30 mL 3    ergocalciferol, vitamin D2, (VITAMIN D ORAL) Take 50 mcg by mouth once daily.      ezetimibe (ZETIA) 10 mg tablet TAKE 1 TABLET (10 MG TOTAL) BY MOUTH EVERY EVENING. 90 tablet 3    fexofenadine (ALLEGRA) 60 MG tablet Take 60 mg by mouth as needed.       fluticasone (FLONASE) 50 mcg/actuation nasal spray 1  spray by Nasal route once daily.       furosemide (LASIX) 20 MG tablet Take 1 tablet (20 mg total) by mouth once daily. 30 tablet 11    gabapentin (NEURONTIN) 100 MG capsule TAKE 1 CAPSULE TWICE DAILY 180 capsule 1    latanoprost 0.005 % ophthalmic solution INSTILL 1 DROP INTO BOTH EYES EVERY EVENING.  3 Bottle 3    levothyroxine (SYNTHROID) 50 MCG tablet TAKE ONE TABLET BY MOUTH EVERY DAY BEFORE BREAKFAST 90 tablet 3    LIDOcaine (LIDODERM) 5 % Place 1 patch onto the skin once daily. Remove & Discard patch within 12 hours or as directed by MD 30 patch 0    lisinopriL (PRINIVIL,ZESTRIL) 20 MG tablet TAKE 1 TABLET EVERY DAY 90 tablet 3    pantoprazole (PROTONIX) 40 MG tablet Take 1 tablet (40 mg total) by mouth once daily. 90 tablet 2     No current facility-administered medications on file prior to visit.        Review of patient's allergies indicates:   Allergen Reactions    Aspirin      Other reaction(s): HEADACHES    Citalopram      tachycardia    Codeine Other (See Comments)     Dizziness    Cytotec [misoprostol] Other (See Comments)     Unknown      Feldene [piroxicam] Other (See Comments)     unknown    Gentamicin Other (See Comments)     Flushing      Indocin [indomethacin sodium] Other (See Comments)     Unknown      Motrin [ibuprofen] Nausea And Vomiting    Paxil [paroxetine hcl] Other (See Comments)     Suicidal feelings    Prozac [fluoxetine] Other (See Comments)     Suicidal feeling    Tofranil [imipramine hcl] Other (See Comments)     Feeling restless    Vancomycin analogues Other (See Comments)     Unknown      Elavil [amitriptyline] Palpitations    Penicillins Rash     Other reaction(s): RASH         Assessments:  Environmental: Lives in single story with no steps to enter  Functional Status: min asst with adls, cane for mobility  Safety: fall precautions  Nutritional: heart healthy  Home Health/DME/Supplies: cane    Objective:       Vitals:    12/07/22 1432   Pulse: 74   Resp: 18   Temp: 97.5 °F  "(36.4 °C)   SpO2: 97%   Weight: 45.4 kg (100 lb)   Height: 5' 2" (1.575 m)     Body mass index is 18.29 kg/m².    Assessment:     No diagnosis found.      Plan:     Ethical / Legal: Advance Care Planning   See ACP on file       1. Hypertensive heart disease without heart failure  -     [facility]  -     Ambulatory referral/consult to Home Health; Future;   The current medical regimen is effective;  continue present plan and medications.     2. Coronary artery disease involving native coronary artery of native heart without angina pectoris  -     Ambulatory referral[facility]consult to [facility]  -     Ambulatory referral/consult to Home Health; Future;   The current medical regimen is effective;  continue present plan and medications.         3. Pacemaker  -     [facility]  -     Ambulatory referral/consult to Home Health; Future;     Were controlled substances prescribed?  No    Patient Instructions Given:  - Continue all medications, treatments and therapies as ordered.   - Follow all instructions, recommendations as discussed.  - Maintain Safety Precautions at all times.  - Attend all medical appointments as scheduled.  - For worsening symptoms: call Primary Care Physician or Nurse Practitioner.  - For emergencies, call 911 or immediately report to the nearest emergency room.    After Visit Medication List :     Medication List            Accurate as of December 7, 2022 11:59 PM. If you have any questions, ask your nurse or doctor.                CHANGE how you take these medications      diclofenac sodium 1 % Gel  Commonly known as: VOLTAREN  Apply 2 g topically once daily.  What changed:   when to take this  reasons to take this     dorzolamide 2 % ophthalmic solution  Commonly known as: TRUSOPT  Place 1 drop into both eyes 3 (three) times daily.  What changed: " when to take this            CONTINUE taking these medications      ALPRAZolam 0.25 MG tablet  Commonly known as: XANAX  Take 1 tablet (0.25 mg total) by mouth daily as needed for Anxiety.     BIOTIN ORAL     clopidogreL 75 mg tablet  Commonly known as: PLAVIX  TAKE 1 TABLET (75 MG TOTAL) BY MOUTH FOUR TIMES A WEEK.     coenzyme Q10 10 mg capsule     ezetimibe 10 mg tablet  Commonly known as: ZETIA  TAKE 1 TABLET (10 MG TOTAL) BY MOUTH EVERY EVENING.     fexofenadine 60 MG tablet  Commonly known as: ALLEGRA     fluticasone propionate 50 mcg/actuation nasal spray  Commonly known as: FLONASE     furosemide 20 MG tablet  Commonly known as: LASIX  Take 1 tablet (20 mg total) by mouth once daily.     gabapentin 100 MG capsule  Commonly known as: NEURONTIN  TAKE 1 CAPSULE TWICE DAILY     latanoprost 0.005 % ophthalmic solution  INSTILL 1 DROP INTO BOTH EYES EVERY EVENING.     levothyroxine 50 MCG tablet  Commonly known as: SYNTHROID  TAKE ONE TABLET BY MOUTH EVERY DAY BEFORE BREAKFAST     LIDOcaine 5 %  Commonly known as: LIDODERM  Place 1 patch onto the skin once daily. Remove & Discard patch within 12 hours or as directed by MD     lisinopriL 20 MG tablet  Commonly known as: PRINIVIL,ZESTRIL  TAKE 1 TABLET EVERY DAY     VITAMIN D ORAL            Future Appointments   Date Time Provider Department Center   2/10/2023 10:00 AM HOME MONITOR DEVICE CHECK, [facility] WHITNEY Jung     Risks of environmental exposure to coronavirus discussed including: social distancing, hand hygiene, and limiting departures from the home for necessities only. Reports understanding and willingness to comply.     Signature:    Lilia Timmons, MSN, APRN, FNP-C  [facility]

## 2022-12-28 NOTE — PATIENT INSTRUCTIONS
Instructions:  - [facility] Nurse Practitioner to schedule home follow-up visit with patient as needed.  - Continue all medications, treatments and therapies as ordered.   - Follow all instructions, recommendations as discussed.  - Maintain Safety Precautions at all times.  - Attend all medical appointments as scheduled.  - For worsening symptoms: call Primary Care Physician or Nurse Practitioner.  - For emergencies, call 911 or immediately report to the nearest emergency room.  - Limit Risks of environmental exposure to coronavirus/COVID-19 as discussed including: social distancing, hand hygiene, and limiting departures from the home for necessities only.

## 2023-01-02 DIAGNOSIS — F41.9 ANXIETY: ICD-10-CM

## 2023-01-02 NOTE — TELEPHONE ENCOUNTER
No new care gaps identified.  [facility] Embedded Care Gaps. Reference number: [id number]. 1/02/2023   1:35:35 PM CST

## 2023-01-03 RX ORDER — ALPRAZOLAM 0.25 MG/1
TABLET ORAL
Qty: 30 TABLET | Refills: 0 | OUTPATIENT
Start: 2023-01-03

## 2023-01-03 NOTE — TELEPHONE ENCOUNTER
Does pt have an upcoming appt with me?    Future Appointments   Date Time Provider Department Center   2/10/2023 10:00 AM HOME MONITOR DEVICE CHECK, WHITNEY Jung

## 2023-01-05 ENCOUNTER — TELEPHONE (OUTPATIENT)
Dept: INTERNAL MEDICINE | Facility: CLINIC | Age: 88
End: 2023-01-05
Payer: MEDICARE

## 2023-01-05 NOTE — TELEPHONE ENCOUNTER
I understand her conditions make it difficult to travel to a clinic but she hasnt seen me since 4/2021. I consider it unsafe to continue prescribing medications without a more recent visit to assess labs, discuss changes in health, etc.

## 2023-01-05 NOTE — TELEPHONE ENCOUNTER
----- Message from Rebecca Ibrahim sent at 1/5/2023  4:33 PM CST -----  Contact: [phone]  Patient is returning a phone call.  Who left a message for the patient: dustin  Does patient know what this is regarding:  n/a  Would you like a call back, or a response through your [facility] portal?:   call  Comments:

## 2023-01-05 NOTE — TELEPHONE ENCOUNTER
----- Message from Jesika Vidal sent at 1/5/2023  3:23 PM CST -----  Contact: [phone] Patient  Requesting an RX refill or new RX.  Is this a refill or new RX: new  RX name and strength (copy/paste from chart):  ALPRAZolam (XANAX) 0.25 MG tablet  Is this a 30 day or 90 day RX:   Pharmacy name and phone # (copy/paste from chart):  [facility] - [place] - [address]  [address]  [place] [address]  Phone: [phone] Fax: [phone]    Pt states she is homebound now and has trouble walking. Pt states she had a home visit with the NP in December.

## 2023-01-12 ENCOUNTER — EXTERNAL HOME HEALTH (OUTPATIENT)
Dept: HOME HEALTH SERVICES | Facility: HOSPITAL | Age: 88
End: 2023-01-12
Payer: MEDICARE

## 2023-01-18 ENCOUNTER — TELEPHONE (OUTPATIENT)
Dept: CARDIOLOGY | Facility: CLINIC | Age: 88
End: 2023-01-18
Payer: MEDICARE

## 2023-01-18 NOTE — TELEPHONE ENCOUNTER
----- Message from Nathan Armendariz sent at 1/18/2023 12:28 PM CST -----  Regarding: Home visits  PT called to see what do they need to do in order for pt to have home visits.  Please call pt @ [phone] or [phone]        Thanks     
Department of Gynecology  Brief Operative Report        Pre-operative Diagnosis:  Menorrhagia with regular cycle, left ovarian cyst    Post-operative Diagnosis:  same    Procedure:  Dx H/S, D&C, NovaSure endometrial ablation, Dx L/S, left ovarian cystectomy    Surgeon:  Wesley Cai      Assistant(s):      Anesthesia:  General Endotracheal Anesthesia    Findings:  Normal uterine cavity, 2 cm left ovarian cyst    Estimated blood loss:  less than 50ml    Blood Transfusion?:  No     Specimens:  Endometrial curettings, left ovarian cyst    Complications:  none    Condition:  good    See dictated operative report for full details.
under satisfactory general  anesthesia, she was placed in the dorsal lithotomy position with Eddie  stirrups. A careful examination under anesthesia was performed  revealing no abnormalities. The vagina, perineum and abdomen were  prepped and draped in the usual sterile fashion. First, Hill retractors  were placed anteriorly and posteriorly in the vaginal vault. The cervix  was grasped along its anterior lip and the uterus was sounded. The  cervix was then dilated with a series of sequential Hegar dilators until  a #7 could be introduced easily into the uterine cavity. The 0 degree  hysteroscope was then introduced using lactated Ringers as a distending  medium. Pan hysteroscopy was performed. There were no abnormalities  noted within the uterine cavity. Both fallopian tubes were identified  and were normal.  At this point, the hysteroscope was withdrawn. Sharp  curettage was then performed for a moderate amount of normal appearing  tissue. The NovaSure was then introduced into the uterine cavity and  the cavity length was calculated to be 4.5 cm, cavity width was 4.4 cm. After cavity assessment was passed, the treatment cycle was begun at a  power setting of 109 patrick. The treatment cycle lasted for 1 minute and  28 seconds. The NovaSure was removed, inspected, and found to be  intact. The hysteroscope was reinserted and there was excellent  ablative effect from the NovaSure procedure. There was no evidence of  perforation. A HUMI uterine manipulator was then introduced and then  attention was directed to the abdominal portion of the procedure. A  solution of 0.5% Marcaine without epinephrine was instilled into the  umbilicus for a total of 5 mL. Using the Optiview 5 mm trocar attached  to the laparoscope, the abdomen was entered. Once entry into the  peritoneal cavity was confirmed visually, then the CO2 gas was allowed  to insufflate the abdomen.   The patient was placed in a modest amount

## 2023-02-10 ENCOUNTER — PATIENT MESSAGE (OUTPATIENT)
Dept: PRIMARY CARE CLINIC | Facility: CLINIC | Age: 88
End: 2023-02-10
Payer: MEDICARE

## 2023-02-14 ENCOUNTER — NURSE TRIAGE (OUTPATIENT)
Dept: ADMINISTRATIVE | Facility: CLINIC | Age: 88
End: 2023-02-14
Payer: MEDICARE

## 2023-02-14 NOTE — TELEPHONE ENCOUNTER
Spoke with niece of pt who reports that pt is complaining of feeling hot, and having chills, reports pain to left arm, and head pain to right side of head. Advised to be seen today. ED/UC advised. States pt does not have capabilties to go out to be seen. OCA, and RR offered. Spoke with Ely with RR call transferred to schedule appointment.    Reason for Disposition   Patient wants to be seen    Additional Information   Negative: Difficult to awaken or acting confused (e.g., disoriented, slurred speech)   Negative: Weakness of the face, arm or leg on one side of the body and new-onset   Negative: Numbness of the face, arm or leg on one side of the body and new-onset   Negative: Loss of speech or garbled speech and new-onset   Negative: Passed out (i.e., fainted, collapsed and was not responding)   Negative: Sounds like a life-threatening emergency to the triager   Negative: Unable to walk without falling   Negative: Stiff neck (can't touch chin to chest)   Negative: Possibility of carbon monoxide exposure   Negative: SEVERE headache, states 'worst headache' of life   Negative: SEVERE headache, sudden-onset (i.e., reaching maximum intensity within seconds to 1 hour)   Negative: Severe pain in one eye   Negative: Loss of vision or double vision  (Exception: Same as prior migraines.)   Negative: Patient sounds very sick or weak to the triager   Negative: Fever > 103 F (39.4 C)   Negative: Fever > 100.0 F (37.8 C) and has diabetes mellitus or a weak immune system (e.g., HIV positive, cancer chemotherapy, organ transplant, splenectomy, chronic steroids)   Negative: SEVERE headache (e.g., excruciating) and has had severe headaches before   Negative: SEVERE headache and not relieved by pain meds   Negative: SEVERE headache and vomiting   Negative: SEVERE headache and fever   Negative: New headache and weak immune system (e.g., HIV positive, cancer chemo, splenectomy, organ transplant, chronic steroids)   Negative: Fever  present > 3 days (72 hours)    Protocols used: Headache-A-OH

## 2023-02-16 DIAGNOSIS — M47.812 CERVICAL SPONDYLOSIS WITHOUT MYELOPATHY: Primary | ICD-10-CM

## 2023-02-16 DIAGNOSIS — F33.0 MILD RECURRENT MAJOR DEPRESSION: ICD-10-CM

## 2023-02-16 NOTE — PROGRESS NOTES
Received call from patient's niece requesting medication refill (Xanax and Tramadol). She reports she has not seen her PCP in the office and therefore the meds cannot be filled. Patient has transportation/mobility issues,  she is a high readmission risk, I referred her to [facility] for closer observation.

## 2023-02-20 ENCOUNTER — PATIENT MESSAGE (OUTPATIENT)
Dept: PRIMARY CARE CLINIC | Facility: CLINIC | Age: 88
End: 2023-02-20
Payer: MEDICARE

## 2023-02-28 ENCOUNTER — EXTERNAL HOME HEALTH (OUTPATIENT)
Dept: HOME HEALTH SERVICES | Facility: HOSPITAL | Age: 88
End: 2023-02-28
Payer: MEDICARE

## 2023-03-22 ENCOUNTER — HOSPITAL ENCOUNTER (INPATIENT)
Facility: HOSPITAL | Age: 88
LOS: 5 days | Discharge: HOME HEALTH | DRG: 280 | End: 2023-03-27
Attending: EMERGENCY MEDICINE | Admitting: HOSPITALIST
Payer: MEDICARE

## 2023-03-22 DIAGNOSIS — R07.9 CHEST PAIN: ICD-10-CM

## 2023-03-22 DIAGNOSIS — N17.9 AKI (ACUTE KIDNEY INJURY): ICD-10-CM

## 2023-03-22 DIAGNOSIS — I50.20 HFREF (HEART FAILURE WITH REDUCED EJECTION FRACTION): ICD-10-CM

## 2023-03-22 DIAGNOSIS — F41.9 ANXIETY: ICD-10-CM

## 2023-03-22 DIAGNOSIS — I21.4 NON-ST ELEVATION MYOCARDIAL INFARCTION (NSTEMI): ICD-10-CM

## 2023-03-22 DIAGNOSIS — R07.9 CHEST PAIN, UNSPECIFIED TYPE: Primary | ICD-10-CM

## 2023-03-22 DIAGNOSIS — M54.9 BACK PAIN, UNSPECIFIED BACK LOCATION, UNSPECIFIED BACK PAIN LATERALITY, UNSPECIFIED CHRONICITY: ICD-10-CM

## 2023-03-22 DIAGNOSIS — I21.4 NSTEMI (NON-ST ELEVATION MYOCARDIAL INFARCTION): ICD-10-CM

## 2023-03-22 DIAGNOSIS — I21.4 NSTEMI (NON-ST ELEVATED MYOCARDIAL INFARCTION): ICD-10-CM

## 2023-03-22 DIAGNOSIS — H40.1190 PRIMARY OPEN-ANGLE GLAUCOMA(365.11): ICD-10-CM

## 2023-03-22 PROBLEM — K92.1 GASTROINTESTINAL HEMORRHAGE WITH MELENA: Chronic | Status: ACTIVE | Noted: 2021-05-29

## 2023-03-22 PROBLEM — H40.9 GLAUCOMA: Chronic | Status: ACTIVE | Noted: 2021-02-08

## 2023-03-22 PROBLEM — Z78.9 STATIN INTOLERANCE: Chronic | Status: ACTIVE | Noted: 2021-02-08

## 2023-03-22 LAB
ABO + RH BLD: NORMAL
ALBUMIN SERPL BCP-MCNC: 3.2 G/DL (ref 3.5–5.2)
ALP SERPL-CCNC: 90 U/L (ref 55–135)
ALT SERPL W/O P-5'-P-CCNC: 137 U/L (ref 10–44)
ANION GAP SERPL CALC-SCNC: 9 MMOL/L (ref 8–16)
APTT BLDCRRT: 21.7 SEC (ref 21–32)
AST SERPL-CCNC: 115 U/L (ref 10–40)
BASOPHILS # BLD AUTO: 0.03 K/UL (ref 0–0.2)
BASOPHILS # BLD AUTO: 0.03 K/UL (ref 0–0.2)
BASOPHILS NFR BLD: 0.6 % (ref 0–1.9)
BASOPHILS NFR BLD: 0.6 % (ref 0–1.9)
BILIRUB SERPL-MCNC: 0.3 MG/DL (ref 0.1–1)
BLD GP AB SCN CELLS X3 SERPL QL: NORMAL
BNP SERPL-MCNC: 1022 PG/ML (ref 0–99)
BUN SERPL-MCNC: 40 MG/DL (ref 10–30)
CALCIUM SERPL-MCNC: 9.1 MG/DL (ref 8.7–10.5)
CHLORIDE SERPL-SCNC: 102 MMOL/L (ref 95–110)
CHOLEST SERPL-MCNC: 175 MG/DL (ref 120–199)
CHOLEST/HDLC SERPL: 2.7 {RATIO} (ref 2–5)
CO2 SERPL-SCNC: 23 MMOL/L (ref 23–29)
CREAT SERPL-MCNC: 1.8 MG/DL (ref 0.5–1.4)
DIFFERENTIAL METHOD: ABNORMAL
DIFFERENTIAL METHOD: ABNORMAL
EOSINOPHIL # BLD AUTO: 0 K/UL (ref 0–0.5)
EOSINOPHIL # BLD AUTO: 0 K/UL (ref 0–0.5)
EOSINOPHIL NFR BLD: 0.6 % (ref 0–8)
EOSINOPHIL NFR BLD: 0.6 % (ref 0–8)
ERYTHROCYTE [DISTWIDTH] IN BLOOD BY AUTOMATED COUNT: 14.1 % (ref 11.5–14.5)
ERYTHROCYTE [DISTWIDTH] IN BLOOD BY AUTOMATED COUNT: 14.3 % (ref 11.5–14.5)
EST. GFR  (NO RACE VARIABLE): 25 ML/MIN/1.73 M^2
ESTIMATED AVG GLUCOSE: 126 MG/DL (ref 68–131)
GLUCOSE SERPL-MCNC: 100 MG/DL (ref 70–110)
HBA1C MFR BLD: 6 % (ref 4–5.6)
HCT VFR BLD AUTO: 31.6 % (ref 37–48.5)
HCT VFR BLD AUTO: 35.3 % (ref 37–48.5)
HDLC SERPL-MCNC: 64 MG/DL (ref 40–75)
HDLC SERPL: 36.6 % (ref 20–50)
HGB BLD-MCNC: 10.3 G/DL (ref 12–16)
HGB BLD-MCNC: 11.7 G/DL (ref 12–16)
IMM GRANULOCYTES # BLD AUTO: 0.01 K/UL (ref 0–0.04)
IMM GRANULOCYTES # BLD AUTO: 0.01 K/UL (ref 0–0.04)
IMM GRANULOCYTES NFR BLD AUTO: 0.2 % (ref 0–0.5)
IMM GRANULOCYTES NFR BLD AUTO: 0.2 % (ref 0–0.5)
INR PPP: 1 (ref 0.8–1.2)
LDLC SERPL CALC-MCNC: 85.6 MG/DL (ref 63–159)
LYMPHOCYTES # BLD AUTO: 1.4 K/UL (ref 1–4.8)
LYMPHOCYTES # BLD AUTO: 1.7 K/UL (ref 1–4.8)
LYMPHOCYTES NFR BLD: 27.7 % (ref 18–48)
LYMPHOCYTES NFR BLD: 30.9 % (ref 18–48)
MCH RBC QN AUTO: 32.4 PG (ref 27–31)
MCH RBC QN AUTO: 33.3 PG (ref 27–31)
MCHC RBC AUTO-ENTMCNC: 32.6 G/DL (ref 32–36)
MCHC RBC AUTO-ENTMCNC: 33.1 G/DL (ref 32–36)
MCV RBC AUTO: 101 FL (ref 82–98)
MCV RBC AUTO: 99 FL (ref 82–98)
MONOCYTES # BLD AUTO: 0.4 K/UL (ref 0.3–1)
MONOCYTES # BLD AUTO: 0.4 K/UL (ref 0.3–1)
MONOCYTES NFR BLD: 7.2 % (ref 4–15)
MONOCYTES NFR BLD: 7.7 % (ref 4–15)
NEUTROPHILS # BLD AUTO: 3.1 K/UL (ref 1.8–7.7)
NEUTROPHILS # BLD AUTO: 3.3 K/UL (ref 1.8–7.7)
NEUTROPHILS NFR BLD: 60.5 % (ref 38–73)
NEUTROPHILS NFR BLD: 63.2 % (ref 38–73)
NONHDLC SERPL-MCNC: 111 MG/DL
NRBC BLD-RTO: 0 /100 WBC
NRBC BLD-RTO: 0 /100 WBC
PLATELET # BLD AUTO: 215 K/UL (ref 150–450)
PLATELET # BLD AUTO: 242 K/UL (ref 150–450)
PMV BLD AUTO: 10.9 FL (ref 9.2–12.9)
PMV BLD AUTO: 11 FL (ref 9.2–12.9)
POTASSIUM SERPL-SCNC: 4.6 MMOL/L (ref 3.5–5.1)
PROT SERPL-MCNC: 8.7 G/DL (ref 6–8.4)
PROTHROMBIN TIME: 10.3 SEC (ref 9–12.5)
RBC # BLD AUTO: 3.18 M/UL (ref 4–5.4)
RBC # BLD AUTO: 3.51 M/UL (ref 4–5.4)
SODIUM SERPL-SCNC: 134 MMOL/L (ref 136–145)
SPECIMEN OUTDATE: NORMAL
TRIGL SERPL-MCNC: 127 MG/DL (ref 30–150)
TROPONIN I SERPL DL<=0.01 NG/ML-MCNC: 8.14 NG/ML (ref 0–0.03)
WBC # BLD AUTO: 4.95 K/UL (ref 3.9–12.7)
WBC # BLD AUTO: 5.4 K/UL (ref 3.9–12.7)

## 2023-03-22 PROCEDURE — 93010 EKG 12-LEAD: ICD-10-PCS | Mod: HCNC,,, | Performed by: INTERNAL MEDICINE

## 2023-03-22 PROCEDURE — 84484 ASSAY OF TROPONIN QUANT: CPT | Mod: HCNC | Performed by: EMERGENCY MEDICINE

## 2023-03-22 PROCEDURE — 93005 ELECTROCARDIOGRAM TRACING: CPT | Mod: HCNC

## 2023-03-22 PROCEDURE — 86900 BLOOD TYPING SEROLOGIC ABO: CPT | Mod: HCNC

## 2023-03-22 PROCEDURE — 93010 ELECTROCARDIOGRAM REPORT: CPT | Mod: HCNC,,, | Performed by: INTERNAL MEDICINE

## 2023-03-22 PROCEDURE — 99291 CRITICAL CARE FIRST HOUR: CPT | Mod: ,,, | Performed by: EMERGENCY MEDICINE

## 2023-03-22 PROCEDURE — 25000003 PHARM REV CODE 250: Mod: HCNC | Performed by: EMERGENCY MEDICINE

## 2023-03-22 PROCEDURE — 63600175 PHARM REV CODE 636 W HCPCS: Mod: HCNC | Performed by: EMERGENCY MEDICINE

## 2023-03-22 PROCEDURE — 99291 CRITICAL CARE FIRST HOUR: CPT | Mod: HCNC

## 2023-03-22 PROCEDURE — 25000003 PHARM REV CODE 250: Mod: HCNC

## 2023-03-22 PROCEDURE — 80061 LIPID PANEL: CPT | Mod: HCNC | Performed by: EMERGENCY MEDICINE

## 2023-03-22 PROCEDURE — 83036 HEMOGLOBIN GLYCOSYLATED A1C: CPT | Mod: HCNC | Performed by: EMERGENCY MEDICINE

## 2023-03-22 PROCEDURE — 83880 ASSAY OF NATRIURETIC PEPTIDE: CPT | Mod: HCNC | Performed by: EMERGENCY MEDICINE

## 2023-03-22 PROCEDURE — 85610 PROTHROMBIN TIME: CPT | Mod: HCNC | Performed by: EMERGENCY MEDICINE

## 2023-03-22 PROCEDURE — 80053 COMPREHEN METABOLIC PANEL: CPT | Mod: HCNC | Performed by: EMERGENCY MEDICINE

## 2023-03-22 PROCEDURE — 99291 PR CRITICAL CARE, E/M 30-74 MINUTES: ICD-10-PCS | Mod: ,,, | Performed by: EMERGENCY MEDICINE

## 2023-03-22 PROCEDURE — 12000002 HC ACUTE/MED SURGE SEMI-PRIVATE ROOM: Mod: HCNC

## 2023-03-22 PROCEDURE — 85025 COMPLETE CBC W/AUTO DIFF WBC: CPT | Mod: 91,HCNC | Performed by: EMERGENCY MEDICINE

## 2023-03-22 PROCEDURE — 85730 THROMBOPLASTIN TIME PARTIAL: CPT | Mod: HCNC | Performed by: EMERGENCY MEDICINE

## 2023-03-22 RX ORDER — ACETAMINOPHEN 325 MG/1
650 TABLET ORAL EVERY 6 HOURS PRN
Status: DISCONTINUED | OUTPATIENT
Start: 2023-03-23 | End: 2023-03-27 | Stop reason: HOSPADM

## 2023-03-22 RX ORDER — ATORVASTATIN CALCIUM 40 MG/1
80 TABLET, FILM COATED ORAL DAILY
Status: DISCONTINUED | OUTPATIENT
Start: 2023-03-23 | End: 2023-03-22

## 2023-03-22 RX ORDER — ASPIRIN 325 MG
325 TABLET ORAL DAILY
Status: DISCONTINUED | OUTPATIENT
Start: 2023-03-23 | End: 2023-03-22

## 2023-03-22 RX ORDER — AMLODIPINE BESYLATE 10 MG/1
10 TABLET ORAL DAILY
Status: DISCONTINUED | OUTPATIENT
Start: 2023-03-22 | End: 2023-03-24

## 2023-03-22 RX ORDER — NITROGLYCERIN 0.4 MG/1
0.4 TABLET SUBLINGUAL EVERY 5 MIN PRN
Status: DISCONTINUED | OUTPATIENT
Start: 2023-03-22 | End: 2023-03-27 | Stop reason: HOSPADM

## 2023-03-22 RX ORDER — HEPARIN SODIUM,PORCINE/D5W 25000/250
0-40 INTRAVENOUS SOLUTION INTRAVENOUS CONTINUOUS
Status: DISCONTINUED | OUTPATIENT
Start: 2023-03-22 | End: 2023-03-25

## 2023-03-22 RX ORDER — ALPRAZOLAM 0.25 MG/1
0.25 TABLET ORAL DAILY PRN
Status: DISCONTINUED | OUTPATIENT
Start: 2023-03-22 | End: 2023-03-25

## 2023-03-22 RX ORDER — EZETIMIBE 10 MG/1
10 TABLET ORAL NIGHTLY
Status: DISCONTINUED | OUTPATIENT
Start: 2023-03-23 | End: 2023-03-24

## 2023-03-22 RX ORDER — LATANOPROST 50 UG/ML
1 SOLUTION/ DROPS OPHTHALMIC EVERY EVENING
Status: DISCONTINUED | OUTPATIENT
Start: 2023-03-23 | End: 2023-03-27 | Stop reason: HOSPADM

## 2023-03-22 RX ORDER — TALC
6 POWDER (GRAM) TOPICAL NIGHTLY PRN
Status: DISCONTINUED | OUTPATIENT
Start: 2023-03-22 | End: 2023-03-27 | Stop reason: HOSPADM

## 2023-03-22 RX ORDER — CARVEDILOL 3.12 MG/1
3.12 TABLET ORAL 2 TIMES DAILY
Status: DISCONTINUED | OUTPATIENT
Start: 2023-03-22 | End: 2023-03-23

## 2023-03-22 RX ORDER — SODIUM CHLORIDE 0.9 % (FLUSH) 0.9 %
10 SYRINGE (ML) INJECTION
Status: DISCONTINUED | OUTPATIENT
Start: 2023-03-22 | End: 2023-03-27 | Stop reason: HOSPADM

## 2023-03-22 RX ORDER — NAPROXEN SODIUM 220 MG/1
81 TABLET, FILM COATED ORAL DAILY
Status: DISCONTINUED | OUTPATIENT
Start: 2023-03-23 | End: 2023-03-27 | Stop reason: HOSPADM

## 2023-03-22 RX ORDER — ASPIRIN 325 MG
325 TABLET ORAL ONCE
Status: COMPLETED | OUTPATIENT
Start: 2023-03-22 | End: 2023-03-23

## 2023-03-22 RX ORDER — PANTOPRAZOLE SODIUM 40 MG/1
40 TABLET, DELAYED RELEASE ORAL DAILY
Status: DISCONTINUED | OUTPATIENT
Start: 2023-03-23 | End: 2023-03-27 | Stop reason: HOSPADM

## 2023-03-22 RX ORDER — ASPIRIN 325 MG
325 TABLET ORAL ONCE
Status: DISCONTINUED | OUTPATIENT
Start: 2023-03-23 | End: 2023-03-22

## 2023-03-22 RX ORDER — ACETAMINOPHEN 500 MG
1000 TABLET ORAL
Status: COMPLETED | OUTPATIENT
Start: 2023-03-22 | End: 2023-03-22

## 2023-03-22 RX ORDER — BRIMONIDINE TARTRATE 1.5 MG/ML
1 SOLUTION/ DROPS OPHTHALMIC 2 TIMES DAILY
Status: DISCONTINUED | OUTPATIENT
Start: 2023-03-22 | End: 2023-03-27 | Stop reason: HOSPADM

## 2023-03-22 RX ORDER — GABAPENTIN 100 MG/1
100 CAPSULE ORAL 2 TIMES DAILY
Status: DISCONTINUED | OUTPATIENT
Start: 2023-03-23 | End: 2023-03-25

## 2023-03-22 RX ORDER — LEVOTHYROXINE SODIUM 50 UG/1
50 TABLET ORAL
Status: DISCONTINUED | OUTPATIENT
Start: 2023-03-23 | End: 2023-03-27 | Stop reason: HOSPADM

## 2023-03-22 RX ORDER — LIDOCAINE 50 MG/G
1 PATCH TOPICAL DAILY
Status: DISCONTINUED | OUTPATIENT
Start: 2023-03-23 | End: 2023-03-27 | Stop reason: HOSPADM

## 2023-03-22 RX ORDER — DICLOFENAC SODIUM 10 MG/G
2 GEL TOPICAL 2 TIMES DAILY PRN
Status: DISCONTINUED | OUTPATIENT
Start: 2023-03-23 | End: 2023-03-27 | Stop reason: HOSPADM

## 2023-03-22 RX ORDER — LISINOPRIL 20 MG/1
20 TABLET ORAL DAILY
Status: DISCONTINUED | OUTPATIENT
Start: 2023-03-23 | End: 2023-03-22

## 2023-03-22 RX ORDER — DORZOLAMIDE HCL 20 MG/ML
1 SOLUTION/ DROPS OPHTHALMIC 2 TIMES DAILY
Status: DISCONTINUED | OUTPATIENT
Start: 2023-03-22 | End: 2023-03-27 | Stop reason: HOSPADM

## 2023-03-22 RX ADMIN — HEPARIN SODIUM 12 UNITS/KG/HR: 10000 INJECTION, SOLUTION INTRAVENOUS at 11:03

## 2023-03-22 RX ADMIN — TICAGRELOR 180 MG: 90 TABLET ORAL at 09:03

## 2023-03-22 RX ADMIN — ACETAMINOPHEN 1000 MG: 500 TABLET ORAL at 08:03

## 2023-03-22 RX ADMIN — CARVEDILOL 3.12 MG: 3.12 TABLET, FILM COATED ORAL at 09:03

## 2023-03-23 PROBLEM — R74.01 TRANSAMINITIS: Status: ACTIVE | Noted: 2023-03-23

## 2023-03-23 PROBLEM — N17.9 AKI (ACUTE KIDNEY INJURY): Status: ACTIVE | Noted: 2023-03-23

## 2023-03-23 PROBLEM — R79.89 ELEVATED BRAIN NATRIURETIC PEPTIDE (BNP) LEVEL: Status: ACTIVE | Noted: 2023-03-23

## 2023-03-23 PROBLEM — I21.4 NSTEMI (NON-ST ELEVATED MYOCARDIAL INFARCTION): Status: ACTIVE | Noted: 2023-03-23

## 2023-03-23 LAB
ALBUMIN SERPL BCP-MCNC: 3.1 G/DL (ref 3.5–5.2)
ALP SERPL-CCNC: 88 U/L (ref 55–135)
ALT SERPL W/O P-5'-P-CCNC: 121 U/L (ref 10–44)
AMPHET+METHAMPHET UR QL: NEGATIVE
AMPHET+METHAMPHET UR QL: NEGATIVE
ANION GAP SERPL CALC-SCNC: 10 MMOL/L (ref 8–16)
APTT BLDCRRT: 47.8 SEC (ref 21–32)
APTT BLDCRRT: 55.7 SEC (ref 21–32)
ASCENDING AORTA: 3.12 CM
AST SERPL-CCNC: 123 U/L (ref 10–40)
AV INDEX (PROSTH): 0.33
AV MEAN GRADIENT: 6 MMHG
AV PEAK GRADIENT: 12 MMHG
AV VALVE AREA: 1.02 CM2
AV VELOCITY RATIO: 0.46
BARBITURATES UR QL SCN>200 NG/ML: NEGATIVE
BARBITURATES UR QL SCN>200 NG/ML: NEGATIVE
BASOPHILS # BLD AUTO: 0.03 K/UL (ref 0–0.2)
BASOPHILS NFR BLD: 0.5 % (ref 0–1.9)
BENZODIAZ UR QL SCN>200 NG/ML: ABNORMAL
BENZODIAZ UR QL SCN>200 NG/ML: NEGATIVE
BILIRUB SERPL-MCNC: 0.5 MG/DL (ref 0.1–1)
BSA FOR ECHO PROCEDURE: 1.48 M2
BUN SERPL-MCNC: 42 MG/DL (ref 10–30)
BZE UR QL SCN: NEGATIVE
BZE UR QL SCN: NEGATIVE
CALCIUM SERPL-MCNC: 9.1 MG/DL (ref 8.7–10.5)
CANNABINOIDS UR QL SCN: NEGATIVE
CANNABINOIDS UR QL SCN: NEGATIVE
CHLORIDE SERPL-SCNC: 101 MMOL/L (ref 95–110)
CO2 SERPL-SCNC: 20 MMOL/L (ref 23–29)
CREAT SERPL-MCNC: 1.5 MG/DL (ref 0.5–1.4)
CREAT UR-MCNC: 18 MG/DL (ref 15–325)
CREAT UR-MCNC: 18 MG/DL (ref 15–325)
CREAT UR-MCNC: 87 MG/DL (ref 15–325)
CREAT UR-MCNC: 87 MG/DL (ref 15–325)
CV ECHO LV RWT: 0.41 CM
DIFFERENTIAL METHOD: ABNORMAL
DOP CALC AO PEAK VEL: 1.73 M/S
DOP CALC AO VTI: 36.57 CM
DOP CALC LVOT AREA: 3.1 CM2
DOP CALC LVOT DIAMETER: 2 CM
DOP CALC LVOT PEAK VEL: 0.8 M/S
DOP CALC LVOT STROKE VOLUME: 37.46 CM3
DOP CALCLVOT PEAK VEL VTI: 11.93 CM
E WAVE DECELERATION TIME: 174.46 MSEC
E/A RATIO: 0.85
E/E' RATIO: 24.5 M/S
ECHO LV POSTERIOR WALL: 0.78 CM (ref 0.6–1.1)
EJECTION FRACTION: 35 %
EOSINOPHIL # BLD AUTO: 0 K/UL (ref 0–0.5)
EOSINOPHIL NFR BLD: 0.2 % (ref 0–8)
ERYTHROCYTE [DISTWIDTH] IN BLOOD BY AUTOMATED COUNT: 14.3 % (ref 11.5–14.5)
EST. GFR  (NO RACE VARIABLE): 31.1 ML/MIN/1.73 M^2
ETHANOL UR-MCNC: <10 MG/DL
ETHANOL UR-MCNC: <10 MG/DL
FRACTIONAL SHORTENING: 18 % (ref 28–44)
GLUCOSE SERPL-MCNC: 119 MG/DL (ref 70–110)
HCT VFR BLD AUTO: 34 % (ref 37–48.5)
HGB BLD-MCNC: 10.7 G/DL (ref 12–16)
IMM GRANULOCYTES # BLD AUTO: 0.02 K/UL (ref 0–0.04)
IMM GRANULOCYTES NFR BLD AUTO: 0.3 % (ref 0–0.5)
INTERVENTRICULAR SEPTUM: 0.86 CM (ref 0.6–1.1)
LA MAJOR: 5.75 CM
LA MINOR: 5.84 CM
LA WIDTH: 3.79 CM
LEFT ATRIUM SIZE: 3.8 CM
LEFT ATRIUM VOLUME INDEX MOD: 42.3 ML/M2
LEFT ATRIUM VOLUME INDEX: 47.9 ML/M2
LEFT ATRIUM VOLUME MOD: 62.58 CM3
LEFT ATRIUM VOLUME: 70.94 CM3
LEFT INTERNAL DIMENSION IN SYSTOLE: 3.15 CM (ref 2.1–4)
LEFT VENTRICLE DIASTOLIC VOLUME INDEX: 42.82 ML/M2
LEFT VENTRICLE DIASTOLIC VOLUME: 63.38 ML
LEFT VENTRICLE MASS INDEX: 61 G/M2
LEFT VENTRICLE SYSTOLIC VOLUME INDEX: 26.6 ML/M2
LEFT VENTRICLE SYSTOLIC VOLUME: 39.32 ML
LEFT VENTRICULAR INTERNAL DIMENSION IN DIASTOLE: 3.84 CM (ref 3.5–6)
LEFT VENTRICULAR MASS: 90.41 G
LV LATERAL E/E' RATIO: 19.6 M/S
LV SEPTAL E/E' RATIO: 32.67 M/S
LYMPHOCYTES # BLD AUTO: 1 K/UL (ref 1–4.8)
LYMPHOCYTES NFR BLD: 16.2 % (ref 18–48)
MAGNESIUM SERPL-MCNC: 2.1 MG/DL (ref 1.6–2.6)
MCH RBC QN AUTO: 31.8 PG (ref 27–31)
MCHC RBC AUTO-ENTMCNC: 31.5 G/DL (ref 32–36)
MCV RBC AUTO: 101 FL (ref 82–98)
METHADONE UR QL SCN>300 NG/ML: NEGATIVE
METHADONE UR QL SCN>300 NG/ML: NEGATIVE
MONOCYTES # BLD AUTO: 0.3 K/UL (ref 0.3–1)
MONOCYTES NFR BLD: 5.2 % (ref 4–15)
MV PEAK A VEL: 1.15 M/S
MV PEAK E VEL: 0.98 M/S
MV STENOSIS PRESSURE HALF TIME: 50.59 MS
MV VALVE AREA P 1/2 METHOD: 4.35 CM2
NEUTROPHILS # BLD AUTO: 4.7 K/UL (ref 1.8–7.7)
NEUTROPHILS NFR BLD: 77.6 % (ref 38–73)
NRBC BLD-RTO: 0 /100 WBC
OPIATES UR QL SCN: NEGATIVE
OPIATES UR QL SCN: NEGATIVE
PCP UR QL SCN>25 NG/ML: NEGATIVE
PCP UR QL SCN>25 NG/ML: NEGATIVE
PHOSPHATE SERPL-MCNC: 3 MG/DL (ref 2.7–4.5)
PISA TR MAX VEL: 2.4 M/S
PLATELET # BLD AUTO: 218 K/UL (ref 150–450)
PMV BLD AUTO: 11.3 FL (ref 9.2–12.9)
POTASSIUM SERPL-SCNC: 4.4 MMOL/L (ref 3.5–5.1)
PROT SERPL-MCNC: 8.4 G/DL (ref 6–8.4)
PROT UR-MCNC: 14 MG/DL (ref 0–15)
PROT/CREAT UR: 0.78 MG/G{CREAT} (ref 0–0.2)
RA MAJOR: 5.27 CM
RA PRESSURE: 15 MMHG
RA WIDTH: 3.45 CM
RBC # BLD AUTO: 3.37 M/UL (ref 4–5.4)
RIGHT VENTRICULAR END-DIASTOLIC DIMENSION: 3.97 CM
SINUS: 2.74 CM
SODIUM SERPL-SCNC: 131 MMOL/L (ref 136–145)
SODIUM UR-SCNC: 78 MMOL/L (ref 20–250)
STJ: 2.4 CM
T4 FREE SERPL-MCNC: 1.06 NG/DL (ref 0.71–1.51)
TDI LATERAL: 0.05 M/S
TDI SEPTAL: 0.03 M/S
TDI: 0.04 M/S
TOXICOLOGY INFORMATION: ABNORMAL
TOXICOLOGY INFORMATION: NORMAL
TR MAX PG: 23 MMHG
TRICUSPID ANNULAR PLANE SYSTOLIC EXCURSION: 1.25 CM
TROPONIN I SERPL DL<=0.01 NG/ML-MCNC: 11.17 NG/ML (ref 0–0.03)
TROPONIN I SERPL DL<=0.01 NG/ML-MCNC: 12.46 NG/ML (ref 0–0.03)
TROPONIN I SERPL DL<=0.01 NG/ML-MCNC: 14.79 NG/ML (ref 0–0.03)
TROPONIN I SERPL DL<=0.01 NG/ML-MCNC: 15.49 NG/ML (ref 0–0.03)
TROPONIN I SERPL DL<=0.01 NG/ML-MCNC: 21.45 NG/ML (ref 0–0.03)
TSH SERPL DL<=0.005 MIU/L-ACNC: 6.5 UIU/ML (ref 0.4–4)
TV REST PULMONARY ARTERY PRESSURE: 38 MMHG
UUN UR-MCNC: 724 MG/DL (ref 140–1050)
WBC # BLD AUTO: 6 K/UL (ref 3.9–12.7)

## 2023-03-23 PROCEDURE — 85025 COMPLETE CBC W/AUTO DIFF WBC: CPT | Mod: HCNC | Performed by: EMERGENCY MEDICINE

## 2023-03-23 PROCEDURE — 80307 DRUG TEST PRSMV CHEM ANLYZR: CPT | Mod: HCNC

## 2023-03-23 PROCEDURE — 84439 ASSAY OF FREE THYROXINE: CPT | Mod: HCNC

## 2023-03-23 PROCEDURE — 99222 1ST HOSP IP/OBS MODERATE 55: CPT | Mod: HCNC,GC,, | Performed by: INTERNAL MEDICINE

## 2023-03-23 PROCEDURE — 84156 ASSAY OF PROTEIN URINE: CPT | Mod: HCNC

## 2023-03-23 PROCEDURE — 63600175 PHARM REV CODE 636 W HCPCS: Mod: HCNC | Performed by: STUDENT IN AN ORGANIZED HEALTH CARE EDUCATION/TRAINING PROGRAM

## 2023-03-23 PROCEDURE — 84300 ASSAY OF URINE SODIUM: CPT | Mod: HCNC

## 2023-03-23 PROCEDURE — 80053 COMPREHEN METABOLIC PANEL: CPT | Mod: HCNC

## 2023-03-23 PROCEDURE — 84100 ASSAY OF PHOSPHORUS: CPT | Mod: HCNC

## 2023-03-23 PROCEDURE — 84484 ASSAY OF TROPONIN QUANT: CPT | Mod: 91,HCNC

## 2023-03-23 PROCEDURE — 25000003 PHARM REV CODE 250: Mod: HCNC | Performed by: STUDENT IN AN ORGANIZED HEALTH CARE EDUCATION/TRAINING PROGRAM

## 2023-03-23 PROCEDURE — 99223 1ST HOSP IP/OBS HIGH 75: CPT | Mod: HCNC,GC,, | Performed by: STUDENT IN AN ORGANIZED HEALTH CARE EDUCATION/TRAINING PROGRAM

## 2023-03-23 PROCEDURE — 25000003 PHARM REV CODE 250: Mod: HCNC

## 2023-03-23 PROCEDURE — 83735 ASSAY OF MAGNESIUM: CPT | Mod: HCNC

## 2023-03-23 PROCEDURE — 85730 THROMBOPLASTIN TIME PARTIAL: CPT | Mod: HCNC | Performed by: EMERGENCY MEDICINE

## 2023-03-23 PROCEDURE — 20600001 HC STEP DOWN PRIVATE ROOM: Mod: HCNC

## 2023-03-23 PROCEDURE — 94761 N-INVAS EAR/PLS OXIMETRY MLT: CPT | Mod: HCNC

## 2023-03-23 PROCEDURE — 36415 COLL VENOUS BLD VENIPUNCTURE: CPT | Mod: HCNC | Performed by: EMERGENCY MEDICINE

## 2023-03-23 PROCEDURE — 99222 PR INITIAL HOSPITAL CARE,LEVL II: ICD-10-PCS | Mod: HCNC,GC,, | Performed by: INTERNAL MEDICINE

## 2023-03-23 PROCEDURE — 84484 ASSAY OF TROPONIN QUANT: CPT | Mod: HCNC

## 2023-03-23 PROCEDURE — 84540 ASSAY OF URINE/UREA-N: CPT | Mod: HCNC

## 2023-03-23 PROCEDURE — 99223 PR INITIAL HOSPITAL CARE,LEVL III: ICD-10-PCS | Mod: HCNC,GC,, | Performed by: STUDENT IN AN ORGANIZED HEALTH CARE EDUCATION/TRAINING PROGRAM

## 2023-03-23 PROCEDURE — 63600175 PHARM REV CODE 636 W HCPCS: Mod: HCNC

## 2023-03-23 PROCEDURE — 84443 ASSAY THYROID STIM HORMONE: CPT | Mod: HCNC

## 2023-03-23 PROCEDURE — 36415 COLL VENOUS BLD VENIPUNCTURE: CPT | Mod: HCNC

## 2023-03-23 RX ORDER — CLOPIDOGREL 300 MG/1
300 TABLET, FILM COATED ORAL ONCE
Status: COMPLETED | OUTPATIENT
Start: 2023-03-23 | End: 2023-03-23

## 2023-03-23 RX ORDER — CLOPIDOGREL 300 MG/1
300 TABLET, FILM COATED ORAL ONCE
Status: DISCONTINUED | OUTPATIENT
Start: 2023-03-24 | End: 2023-03-23

## 2023-03-23 RX ORDER — FUROSEMIDE 10 MG/ML
40 INJECTION INTRAMUSCULAR; INTRAVENOUS 2 TIMES DAILY
Status: COMPLETED | OUTPATIENT
Start: 2023-03-23 | End: 2023-03-23

## 2023-03-23 RX ORDER — CLOPIDOGREL BISULFATE 75 MG/1
75 TABLET ORAL DAILY
Status: DISCONTINUED | OUTPATIENT
Start: 2023-03-24 | End: 2023-03-27 | Stop reason: HOSPADM

## 2023-03-23 RX ORDER — CARVEDILOL 6.25 MG/1
6.25 TABLET ORAL 2 TIMES DAILY
Status: DISCONTINUED | OUTPATIENT
Start: 2023-03-23 | End: 2023-03-25

## 2023-03-23 RX ADMIN — FUROSEMIDE 40 MG: 10 INJECTION, SOLUTION INTRAMUSCULAR; INTRAVENOUS at 03:03

## 2023-03-23 RX ADMIN — BRIMONIDINE TARTRATE 1 DROP: 1.5 SOLUTION/ DROPS OPHTHALMIC at 09:03

## 2023-03-23 RX ADMIN — AMLODIPINE BESYLATE 10 MG: 10 TABLET ORAL at 12:03

## 2023-03-23 RX ADMIN — DORZOLAMIDE HYDROCHLORIDE 1 DROP: 20 SOLUTION/ DROPS OPHTHALMIC at 09:03

## 2023-03-23 RX ADMIN — DORZOLAMIDE HYDROCHLORIDE 1 DROP: 20 SOLUTION/ DROPS OPHTHALMIC at 10:03

## 2023-03-23 RX ADMIN — LATANOPROST 1 DROP: 50 SOLUTION OPHTHALMIC at 08:03

## 2023-03-23 RX ADMIN — BRIMONIDINE TARTRATE 1 DROP: 1.5 SOLUTION/ DROPS OPHTHALMIC at 10:03

## 2023-03-23 RX ADMIN — AMLODIPINE BESYLATE 10 MG: 10 TABLET ORAL at 10:03

## 2023-03-23 RX ADMIN — CARVEDILOL 6.25 MG: 6.25 TABLET, FILM COATED ORAL at 10:03

## 2023-03-23 RX ADMIN — LEVOTHYROXINE SODIUM 50 MCG: 50 TABLET ORAL at 05:03

## 2023-03-23 RX ADMIN — EZETIMIBE 10 MG: 10 TABLET ORAL at 08:03

## 2023-03-23 RX ADMIN — LIDOCAINE 1 PATCH: 50 PATCH CUTANEOUS at 10:03

## 2023-03-23 RX ADMIN — SODIUM CHLORIDE, POTASSIUM CHLORIDE, SODIUM LACTATE AND CALCIUM CHLORIDE 1000 ML: 600; 310; 30; 20 INJECTION, SOLUTION INTRAVENOUS at 05:03

## 2023-03-23 RX ADMIN — FUROSEMIDE 40 MG: 10 INJECTION, SOLUTION INTRAMUSCULAR; INTRAVENOUS at 08:03

## 2023-03-23 RX ADMIN — CARVEDILOL 6.25 MG: 6.25 TABLET, FILM COATED ORAL at 08:03

## 2023-03-23 RX ADMIN — ASPIRIN 81 MG 81 MG: 81 TABLET ORAL at 10:03

## 2023-03-23 RX ADMIN — PANTOPRAZOLE SODIUM 40 MG: 40 TABLET, DELAYED RELEASE ORAL at 10:03

## 2023-03-23 RX ADMIN — GABAPENTIN 100 MG: 100 CAPSULE ORAL at 10:03

## 2023-03-23 RX ADMIN — ASPIRIN 325 MG ORAL TABLET 325 MG: 325 PILL ORAL at 12:03

## 2023-03-23 RX ADMIN — CLOPIDOGREL BISULFATE 300 MG: 300 TABLET, FILM COATED ORAL at 10:03

## 2023-03-23 RX ADMIN — GABAPENTIN 100 MG: 100 CAPSULE ORAL at 08:03

## 2023-03-23 NOTE — H&P
[facility] - Cardiology [facility] Medicine  History & Physical    Patient Name: Cat Bolivar  MRN: [id number]  Patient Class: IP- Inpatient  Admission Date: 3/22/2023  Attending Physician: Marco Antonio Hsieh MD   Primary Care Provider: No primary care provider on file.         Patient information was obtained from patient, relative(s), past medical records and ER records.     Subjective:     Principal Problem:NSTEMI (non-ST elevated myocardial infarction)    Chief Complaint:   Chief Complaint   Patient presents with    Chest Pain     Pt has history of Fibromyalgia and has history of chest pain that she has had for years. Per EMS pt caregiver wanted pt to be transported due to the pain, but pt herself did not initially want to go and refused all treatment enroute. Pt describes it as an achy pain and it is normal for her         HPI: 89 y.o. female with PMHx of fibromyalgia, HTN, CAD s/p stent placement around 2005, HLD, prediabetes, glaucoma, anxiety, hypothyroidism, osteoporosis presenting with acute onset of chest pain that has been going on since last night.  She states she has had pain like this for years and thinks it is secondary to her fibromyalgia. The pain is reproducible when she pushes on her chest.  She denies any shortness of breath, nausea, vomiting, diaphoresis. Pain is located substernally, nonexertional, does not take nitroglycerin at home. Heating pads help. Currently pain free at time of evaluation. She did not wish to come to the emergency department but her family got scared when she was complaining of chest pain and called EMS. Denies tobacco use, but has had some exposure to secondhand smoke from her . Denies orthopnea, PND, leg swelling abdominal swelling, abnormal UOP, excessive salt intake beyond her usual. Reports drinks very little at baseline. Reports medication compliance but stated meds are very different from her personal list and meds listed in chart. Does not take lasix  "or aldactone (listed on her med list paper in her folder, aldactone 12.5mg and lasix 20mg prn for SBP>150). Denies previous history of heart failure. Home blood pressures are normally in 110-130s systolic. Has been out of her xanax for a month, takes it for sleep and anxiety (takes no SSRIs). At baseline she ambulates well, barely needing her walker. Has remote hx of fall, but her balance has been good lately. She reports following with Dr. Veliz. Reportedly takes plavix 3x/week per his instructions, but expresses confusion regarding this. Unclear why she has statin intolerance listed per chart review. She and her daughter were unable to provide specific information about what statin induced reaction she might've had. Reports taking Zetia sometimes. Also unclear what allergy she had to aspirin (listed as "headache" in chart), but patient denies being on aspirin or any specific known reaction to it.     In ED, labs were significant for troponin of 8.141, Cr 1.8, BUN 40, Na 134, BNP 1022, , . BP was elevated to 182/66 in ED, on room air, afebrile, HR normal. She wishes to be DNR and does not want invasive intervention such as cardiac cath. Admitted to hospital med for ACS protocol. Has been starting on ticagrelor and heparin drip in ED.       Past Medical History:   Diagnosis Date    Carotid artery disease     Cervical spondylosis without myelopathy 4/28/2014    Fibromyalgia     Fracture of wrist     Fracture, ankle     both at diff times    Glaucoma     Hyperparathyroidism     Hypothyroidism     Osteoporosis, post-menopausal     Unspecified vitamin D deficiency 10/30/2012       Past Surgical History:   Procedure Laterality Date    APPENDECTOMY      Bilateral Levator Repair  7/03    [facility] (Sangeeta Still)    CARDIAC PACEMAKER PLACEMENT      carotid endarterectomy      CATARACT EXTRACTION W/  INTRAOCULAR LENS IMPLANT Bilateral 1990's        CATARACT EXTRACTION W/ INTRAOCULAR LENS " IMPLANTW/ TRABECULECTOMY Right 03/1994    [facility] ( Dr. Ott)    CORONARY ANGIOPLASTY WITH STENT PLACEMENT      ESOPHAGOGASTRODUODENOSCOPY N/A 5/31/2021    Procedure: EGD (ESOPHAGOGASTRODUODENOSCOPY);  Surgeon: Brooks Sanchez MD;  Location: [facility] ([address] [place] [address]);  Service: Endoscopy;  Laterality: N/A;    HYSTERECTOMY      Ptosis Revision Left 10/2003    [facility] (Dr. Ott)    RETINAL DETACHMENT SURGERY  2/02    [facility] (Dr. Mckeon)    thyroid nodule      WRIST SURGERY         Review of patient's allergies indicates:   Allergen Reactions    Aspirin      Other reaction(s): HEADACHES    Citalopram      tachycardia    Codeine Other (See Comments)     Dizziness    Cytotec [misoprostol] Other (See Comments)     Unknown      Feldene [piroxicam] Other (See Comments)     unknown    Gentamicin Other (See Comments)     Flushing      Indocin [indomethacin sodium] Other (See Comments)     Unknown      Motrin [ibuprofen] Nausea And Vomiting    Paxil [paroxetine hcl] Other (See Comments)     Suicidal feelings    Prozac [fluoxetine] Other (See Comments)     Suicidal feeling    Tofranil [imipramine hcl] Other (See Comments)     Feeling restless    Vancomycin analogues Other (See Comments)     Unknown      Elavil [amitriptyline] Palpitations    Penicillins Rash     Other reaction(s): RASH       No current facility-administered medications on file prior to encounter.     Current Outpatient Medications on File Prior to Encounter   Medication Sig    ALPRAZolam (XANAX) 0.25 MG tablet Take 1 tablet (0.25 mg total) by mouth daily as needed for Anxiety.    ergocalciferol, vitamin D2, (VITAMIN D ORAL) Take 50 mcg by mouth once daily.    fexofenadine (ALLEGRA) 60 MG tablet Take 60 mg by mouth as needed.     furosemide (LASIX) 20 MG tablet Take 1 tablet (20 mg total) by mouth once daily.    gabapentin (NEURONTIN) 100 MG capsule TAKE 1 CAPSULE TWICE DAILY    lisinopriL (PRINIVIL,ZESTRIL) 20 MG tablet TAKE 1 TABLET EVERY DAY     BIOTIN ORAL Take 1,000 mg by mouth once daily.    brimonidine 0.15 % OPTH DROP (ALPHAGAN) 0.15 % ophthalmic solution Place 1 drop into both eyes 2 (two) times a day.    calcitonin, salmon, (FORTICAL) 200 unit/actuation nasal spray 1 spray by Nasal route once daily.    clopidogreL (PLAVIX) 75 mg tablet TAKE 1 TABLET (75 MG TOTAL) BY MOUTH FOUR TIMES A WEEK.    coenzyme Q10 10 mg capsule Take 10 mg by mouth once daily.    cycloSPORINE (RESTASIS) 0.05 % ophthalmic emulsion Place 0.4 mLs (1 drop total) into both eyes 2 (two) times daily.    diclofenac sodium (VOLTAREN) 1 % Gel Apply 2 g topically once daily. (Patient taking differently: Apply 2 g topically as needed.)    dorzolamide (TRUSOPT) 2 % ophthalmic solution Place 1 drop into both eyes 3 (three) times daily. (Patient taking differently: Place 1 drop into both eyes 2 (two) times a day.)    ezetimibe (ZETIA) 10 mg tablet TAKE 1 TABLET (10 MG TOTAL) BY MOUTH EVERY EVENING.    fluticasone (FLONASE) 50 mcg/actuation nasal spray 1 spray by Nasal route once daily.     latanoprost 0.005 % ophthalmic solution INSTILL 1 DROP INTO BOTH EYES EVERY EVENING.     levothyroxine (SYNTHROID) 50 MCG tablet TAKE ONE TABLET BY MOUTH EVERY DAY BEFORE BREAKFAST    LIDOcaine (LIDODERM) 5 % Place 1 patch onto the skin once daily. Remove & Discard patch within 12 hours or as directed by MD    pantoprazole (PROTONIX) 40 MG tablet Take 1 tablet (40 mg total) by mouth once daily.     Family History       Problem Relation (Age of Onset)    Glaucoma Maternal Grandfather    Goiter Mother    Retinal detachment Brother    Urolithiasis Father          Tobacco Use    Smoking status: Never    Smokeless tobacco: Never   Substance and Sexual Activity    Alcohol use: No    Drug use: Not on file    Sexual activity: Not on file     Review of Systems   Constitutional:  Negative for chills, diaphoresis and fever.   HENT:  Negative for congestion and drooling.    Eyes:  Negative for  photophobia and visual disturbance.   Respiratory:  Negative for shortness of breath and wheezing.    Cardiovascular:  Positive for chest pain. Negative for leg swelling.   Gastrointestinal:  Negative for abdominal distention, abdominal pain, constipation, diarrhea, nausea and vomiting.   Genitourinary:  Negative for decreased urine volume, dysuria and hematuria.   Musculoskeletal:  Positive for myalgias. Negative for gait problem.   Skin:  Negative for wound.   Neurological:  Negative for speech difficulty and light-headedness.   Psychiatric/Behavioral:  Negative for agitation and behavioral problems.    Objective:     Vital Signs (Most Recent):  Temp: 98.9 °F (37.2 °C) (03/22/23 1917)  Pulse: 65 (03/22/23 2302)  Resp: (!) 23 (03/22/23 2302)  BP: (!) 182/66 (03/22/23 2302)  SpO2: 99 % (03/22/23 2302)   Vital Signs (24h Range):  Temp:  [98.9 °F (37.2 °C)] 98.9 °F (37.2 °C)  Pulse:  [64-65] 65  Resp:  [18-31] 23  SpO2:  [99 %-100 %] 99 %  BP: (166-182)/(66-86) 182/66     Weight: 49.9 kg (110 lb)  Body mass index is 20.12 kg/m².    Physical Exam  Constitutional:       General: She is not in acute distress.     Appearance: She is normal weight. She is not ill-appearing.   HENT:      Head: Normocephalic and atraumatic.      Right Ear: Decreased hearing noted.      Left Ear: Decreased hearing noted.      Ears:      Comments: Hard of hearing     Nose: Nose normal.      Mouth/Throat:      Mouth: Mucous membranes are moist.   Eyes:      General: No scleral icterus.  Cardiovascular:      Rate and Rhythm: Normal rate and regular rhythm.      Pulses: Normal pulses.      Heart sounds: No murmur heard.    No gallop.   Pulmonary:      Effort: Pulmonary effort is normal. No respiratory distress.      Breath sounds: Normal breath sounds. No wheezing or rales.   Abdominal:      General: Abdomen is flat. Bowel sounds are normal. There is no distension.      Palpations: Abdomen is soft.      Tenderness: There is no abdominal  tenderness. There is no guarding.   Musculoskeletal:      Right lower leg: No edema.      Left lower leg: No edema.   Skin:     General: Skin is warm and dry.      Capillary Refill: Capillary refill takes less than 2 seconds.      Coloration: Skin is not jaundiced.   Neurological:      General: No focal deficit present.      Mental Status: She is alert and oriented to person, place, and time.   Psychiatric:         Mood and Affect: Mood normal.         Behavior: Behavior normal.           Significant Labs: All pertinent labs within the past 24 hours have been reviewed.  A1C:   Recent Labs   Lab 03/22/23 1953   HGBA1C 6.0*     CBC:   Recent Labs   Lab 03/22/23 1953 03/22/23 2158   WBC 5.40 4.95   HGB 11.7* 10.3*   HCT 35.3* 31.6*    215     CMP:   Recent Labs   Lab 03/22/23 1953   *   K 4.6      CO2 23      BUN 40*   CREATININE 1.8*   CALCIUM 9.1   PROT 8.7*   ALBUMIN 3.2*   BILITOT 0.3   ALKPHOS 90   *   *   ANIONGAP 9     Cardiac Markers:   Recent Labs   Lab 03/22/23 1953   BNP 1,022*     Lipid Panel:   Recent Labs   Lab 03/22/23 1953   CHOL 175   HDL 64   LDLCALC 85.6   TRIG 127   CHOLHDL 36.6     Troponin:   Recent Labs   Lab 03/22/23 1953   TROPONINI 8.141*       Significant Imaging: I have reviewed all pertinent imaging results/findings within the past 24 hours.    Assessment/Plan:     * NSTEMI (non-ST elevated myocardial infarction)  Presented with chest pain that is reproducible with palpation.   EKG showed: paced rhythm, no ischemic changes  Trop 8.14-->11.17, BNP 1022  MAR 3, [address] 132  Renal fxn: OFELIA, Cr 1.8    Plan:  - ACS protocol with aspirin, ticagrelor initially (later changed to plavix per cardiology recs due to hx of gastric ulcer), and heparin gtt  - DNR, refused cath  - TTE  - Trend trop to peak  - Lipid panel, TSH  - Statin: intolerance  - B-blocker: coreg  - ACEi/ARB: held lisinopril due to ofelia  - NTG/repeat EKG PRN for chest  pain        Transaminitis  Unclear cause. Has hepatic steatosis on previous imaging. Possibly congestive hepatopathy in setting of NSTEMI.     - trend CMP  - no statin started  - consider viral hepatitis workup, RUQ US if rising  - avoid hepatotoxic agents      Elevated brain natriuretic peptide (BNP) level  Elevated bnp of 1022, previously normal. Denies previously dx of heart failure. No echo found on file. CXR clear. On room air. Takes prn Lasix 20 mg and aldactone 12.5 prn.     - echo ordered  - Cards unable to do beside US since patient was tired, unable to assess IVC  - not clinically volume overloaded, will hold for now      OFELIA (acute kidney injury)  Creatinine 1.8 on admit, baseline around 1.2  - Likely pre-renal from dehydration and volume losses  Lab Results   Component Value Date    CREATININE 1.8 (H) 03/22/2023       Plan:   - Check urine lytes and renal ultrasound if not improved with IVF  - Strict I&Os and daily weights   - Daily BMP  - Trend sCr  - Avoid nephrotoxic agents such as NSAIDs, gadolinium, ACEi, ARBs and IV radiocontrast.  - Renally dose meds to current GFR.  - Maintain MAP > 65.  - No indications for HD at this time.   - Maintain electrolytes at goal: Mg > 2, Phos > 3, K > 4.        Primary hypertension  Takes lisinopril at home. Held due to OFELIA. Home Bps normal. Hypertensive in ED.     - started on amlodipine and coreg      Gastrointestinal hemorrhage with melena  Hx of gastric ulcer possibly 2/2 to NSAID use. Denies current GIB      Statin intolerance  Held off initiating statin as part of ACS protocol    - lipid panel  - resumed home zetia      Glaucoma  Resume home drops      Presence of stent in coronary artery  S/p stent around 2005      Anxiety  Denies taking sertraline and escitalopram listed on her home med printout sheet. Only takes xanax 0.25 prn at night to sleep. Has been out of that prescription for a month.     - low risk of benzo withdrawal  - xanax 0.25  prn      Pacemaker  noted      Hypothyroidism  Continue home synthroid      VTE Risk Mitigation (From admission, onward)         Ordered     heparin 25,000 units in dextrose 5% (100 units/ml) IV bolus from bag - ADDITIONAL PRN BOLUS - 60 units/kg (max bolus 4000 units)  As needed (PRN)        Question:  Heparin Infusion Adjustment (DO NOT MODIFY ANSWER)  Answer:  [email or web address]\epic\Images\Pharmacy\HeparinInfusions\heparin LOW INTENSITY nomogram for [facility] [id number]    03/22/23 2109     heparin 25,000 units in dextrose 5% (100 units/ml) IV bolus from bag - ADDITIONAL PRN BOLUS - 30 units/kg (max bolus 4000 units)  As needed (PRN)        Question:  Heparin Infusion Adjustment (DO NOT MODIFY ANSWER)  Answer:  [email or web address]\[email or web address]\Images\Pharmacy\HeparinInfusions\heparin LOW INTENSITY nomogram for [facility] [id number].pdf    03/22/23 2109     heparin 25,000 units in dextrose 5% 250 mL (100 units/mL) infusion LOW INTENSITY nomogram - [facility]  Continuous        Question Answer Comment   Heparin Infusion Adjustment (DO NOT MODIFY ANSWER) [email or web address]\epic\Images\Pharmacy\HeparinInfusions\heparin LOW INTENSITY nomogram for [facility] [id number]    Begin at (in units/kg/hr) 12        03/22/23 2109     IP VTE HIGH RISK PATIENT  Once         03/22/23 2259     Place sequential compression device  Until discontinued         03/22/23 2259     Place sequential compression device  Until discontinued         03/22/23 2140     Reason for no Mechanical VTE Prophylaxis  Once        Question:  Reasons:  Answer:  Physician Provided (leave comment)  Comment:  on heparin drip    03/22/23 2140                   On 03/23/2023, patient should be placed in hospital observation services under my care.        Lan Middleton MD  Department of Hospital Medicine  [facility] - Cardiology Stepdown

## 2023-03-23 NOTE — ED PROVIDER NOTES
Chief complaint:  Chest Pain (Pt has history of Fibromyalgia and has history of chest pain that she has had for years. Per EMS pt caregiver wanted pt to be transported due to the pain, but pt herself did not initially want to go and refused all treatment enroute. Pt describes it as an achy pain and it is normal for her )      HPI:  Cat Bolivar is a 89 y.o. female presenting with acute onset of chest pain that has been going on since last night.  She states she has had pain like this for years and thinks it is secondary to her fibromyalgia.  The pain is reproducible when she pushes on her chest.  She denies any shortness of breath.  This episode of pain has been going on since last night.  No fevers or chills.  No cough or congestion.  She did not wish to come to the emergency department but her family got scared when she was complaining of chest pain and called EMS.    ROS: As per HPI and below:  Constitutional:  No fevers, no chills  Eyes: no visual changes  Cardiac:  chest pain  Respiratory: no shortness of breath  Abdominal: no abdominal pain, no nausea, no vomiting  Genitourinary: No dysuria, no frequency  Skin: no rash  Heme: no bleeding  Musculoskeletal: no joint pain  Neuro: no focal numbness, no focal weakness  Pyschological: no depression      Review of patient's allergies indicates:   Allergen Reactions    Aspirin      Other reaction(s): HEADACHES    Citalopram      tachycardia    Codeine Other (See Comments)     Dizziness    Cytotec [misoprostol] Other (See Comments)     Unknown      Feldene [piroxicam] Other (See Comments)     unknown    Gentamicin Other (See Comments)     Flushing      Indocin [indomethacin sodium] Other (See Comments)     Unknown      Motrin [ibuprofen] Nausea And Vomiting    Paxil [paroxetine hcl] Other (See Comments)     Suicidal feelings    Prozac [fluoxetine] Other (See Comments)     Suicidal feeling    Tofranil [imipramine hcl] Other (See Comments)     Feeling restless     Vancomycin analogues Other (See Comments)     Unknown      Elavil [amitriptyline] Palpitations    Penicillins Rash     Other reaction(s): RASH       No current facility-administered medications on file prior to encounter.     Current Outpatient Medications on File Prior to Encounter   Medication Sig Dispense Refill    ALPRAZolam (XANAX) 0.25 MG tablet Take 1 tablet (0.25 mg total) by mouth daily as needed for Anxiety. 30 tablet 0    ergocalciferol, vitamin D2, (VITAMIN D ORAL) Take 50 mcg by mouth once daily.      fexofenadine (ALLEGRA) 60 MG tablet Take 60 mg by mouth as needed.       furosemide (LASIX) 20 MG tablet Take 1 tablet (20 mg total) by mouth once daily. 30 tablet 11    gabapentin (NEURONTIN) 100 MG capsule TAKE 1 CAPSULE TWICE DAILY 180 capsule 1    lisinopriL (PRINIVIL,ZESTRIL) 20 MG tablet TAKE 1 TABLET EVERY DAY 90 tablet 3    BIOTIN ORAL Take 1,000 mg by mouth once daily.      brimonidine 0.15 % OPTH DROP (ALPHAGAN) 0.15 % ophthalmic solution Place 1 drop into both eyes 2 (two) times a day. 4 mL 11    calcitonin, salmon, (FORTICAL) 200 unit/actuation nasal spray 1 spray by Nasal route once daily. 1 Bottle 0    clopidogreL (PLAVIX) 75 mg tablet TAKE 1 TABLET (75 MG TOTAL) BY MOUTH FOUR TIMES A WEEK. 52 tablet 3    coenzyme Q10 10 mg capsule Take 10 mg by mouth once daily.      cycloSPORINE (RESTASIS) 0.05 % ophthalmic emulsion Place 0.4 mLs (1 drop total) into both eyes 2 (two) times daily. 180 each 3    diclofenac sodium (VOLTAREN) 1 % Gel Apply 2 g topically once daily. (Patient taking differently: Apply 2 g topically as needed.) 100 g 11    dorzolamide (TRUSOPT) 2 % ophthalmic solution Place 1 drop into both eyes 3 (three) times daily. (Patient taking differently: Place 1 drop into both eyes 2 (two) times a day.) 30 mL 3    ezetimibe (ZETIA) 10 mg tablet TAKE 1 TABLET (10 MG TOTAL) BY MOUTH EVERY EVENING. 90 tablet 3    fluticasone (FLONASE) 50 mcg/actuation nasal spray 1 spray by Nasal route once  daily.       latanoprost 0.005 % ophthalmic solution INSTILL 1 DROP INTO BOTH EYES EVERY EVENING.  3 Bottle 3    levothyroxine (SYNTHROID) 50 MCG tablet TAKE ONE TABLET BY MOUTH EVERY DAY BEFORE BREAKFAST 90 tablet 3    LIDOcaine (LIDODERM) 5 % Place 1 patch onto the skin once daily. Remove & Discard patch within 12 hours or as directed by MD 30 patch 0    pantoprazole (PROTONIX) 40 MG tablet Take 1 tablet (40 mg total) by mouth once daily. 90 tablet 2       PMH:  As per HPI and below:  Past Medical History:   Diagnosis Date    Carotid artery disease     Cervical spondylosis without myelopathy 4/28/2014    Fibromyalgia     Fracture of wrist     Fracture, ankle     both at diff times    Glaucoma     Hyperparathyroidism     Hypothyroidism     Osteoporosis, post-menopausal     Unspecified vitamin D deficiency 10/30/2012     Past Surgical History:   Procedure Laterality Date    APPENDECTOMY      Bilateral Levator Repair  7/03    [facility] (Sangeeta Still)    CARDIAC PACEMAKER PLACEMENT      carotid endarterectomy      CATARACT EXTRACTION W/  INTRAOCULAR LENS IMPLANT Bilateral 1990's        CATARACT EXTRACTION W/ INTRAOCULAR LENS IMPLANTW/ TRABECULECTOMY Right 03/1994    [facility] ( Dr. tOt)    CORONARY ANGIOPLASTY WITH STENT PLACEMENT      ESOPHAGOGASTRODUODENOSCOPY N/A 5/31/2021    Procedure: EGD (ESOPHAGOGASTRODUODENOSCOPY);  Surgeon: Brooks Sanchez MD;  Location: [facility] ([address] [place] [address]);  Service: Endoscopy;  Laterality: N/A;    HYSTERECTOMY      Ptosis Revision Left 10/2003    [facility] (Dr. Ott)    RETINAL DETACHMENT SURGERY  2/02    [facility] (Dr. Mckeon)    thyroid nodule      WRIST SURGERY         Social History     Socioeconomic History    Marital status:    Tobacco Use    Smoking status: Never    Smokeless tobacco: Never   Substance and Sexual Activity    Alcohol use: No   Social History Narrative    Retired. No children. Her niece helps her with errands.       Family History   Problem Relation Age of Onset    Goiter Mother      Urolithiasis Father     Retinal detachment Brother     Glaucoma Maternal Grandfather     Amblyopia Neg Hx     Blindness Neg Hx     Diabetes Neg Hx     Macular degeneration Neg Hx     Strabismus Neg Hx     Cataracts Neg Hx        Physical Exam:    Vitals:    03/22/23 1917   BP:    Pulse:    Resp:    Temp: 98.9 °F (37.2 °C)     Constitutional: Well-nourished, well-developed, in no acute distress, cachectic  Eyes: PERRLA, EOMI, normal conjunctiva, normal sclera  ENT: Moist Mucous membranes  Respiratory: Clear to auscultation bilaterally, no wheezes, no crackles, no rhonchi  Cardiovascular: Regular rate and rhythm, no murmurs, no rubs, no gallops, tender to palpation to anterior chest wall reproducing pain  Abdominal: Soft, nontender, nondistended, no guarding, no rebound  Musculoskeletal: Normal range of motion, no obvious deformity, normal capillary refill, head atraumatic, neck supple, no meningismus  Skin: no rash, no ecchymosis, no errythema, no discharge  Neurologic: Cranial nerves II through XII intact, no motor deficits, no sensory deficits, no cerebellar deficits  Psychological: Alert, oriented x3, normal affect, normal mood    Orders Placed This Encounter   Procedures    X-Ray Chest AP Portable    CBC auto differential    Comprehensive metabolic panel    Brain natriuretic peptide    Troponin I    APTT    APTT    Protime-INR    CBC auto differential    CBC auto differential    Draw aPTT level 6 hours after start of infusion; adjust dosage and order aPTT based on the nomogram in hyperlink    Do not administer any intramuscular injections, call physician for an alternative route or medication if medication is needed    If bleeding occurs, or HIT is suspected, stop heparin infusion and contact physician    EKG 12-lead    Insert Saline lock IV    Admit to Inpatient       Medications   heparin 25,000 units in dextrose 5% (100 units/ml) IV bolus from bag INITIAL BOLUS (max bolus 4000 units) (has no administration  in time range)   heparin 25,000 units in dextrose 5% 250 mL (100 units/mL) infusion LOW INTENSITY nomogram - [facility] (has no administration in time range)   heparin 25,000 units in dextrose 5% (100 units/ml) IV bolus from bag - ADDITIONAL PRN BOLUS - 60 units/kg (max bolus 4000 units) (has no administration in time range)   heparin 25,000 units in dextrose 5% (100 units/ml) IV bolus from bag - ADDITIONAL PRN BOLUS - 30 units/kg (max bolus 4000 units) (has no administration in time range)   ticagrelor tablet 90 mg (has no administration in time range)   acetaminophen tablet 1,000 mg (1,000 mg Oral Given 3/22/23 2008)         Labs Reviewed   CBC W/ AUTO DIFFERENTIAL - Abnormal; Notable for the following components:       Result Value    RBC 3.51 (*)     Hemoglobin 11.7 (*)     Hematocrit 35.3 (*)      (*)     MCH 33.3 (*)     All other components within normal limits   COMPREHENSIVE METABOLIC PANEL - Abnormal; Notable for the following components:    Sodium 134 (*)     BUN 40 (*)     Creatinine 1.8 (*)     Total Protein 8.7 (*)     Albumin 3.2 (*)      (*)      (*)     eGFR 25.0 (*)     All other components within normal limits   B-TYPE NATRIURETIC PEPTIDE - Abnormal; Notable for the following components:    BNP 1,022 (*)     All other components within normal limits   TROPONIN I - Abnormal; Notable for the following components:    Troponin I 8.141 (*)     All other components within normal limits   APTT   PROTIME-INR   CBC W/ AUTO DIFFERENTIAL         Medical Decision Making  Differential diagnosis includes chest wall pain, fibromyalgia, life-threatening ACS, CHF, pneumonia     Patient presents complaining of chest discomfort that has been going on since last night.  She states this is typical for her fibromyalgia but her family got scared and center of the emergency department.  Pain is reproducible on examination but given her age I will perform a screening cardiac workup and if negative will  discharge home.    Patient's cardiac workup is concerning for an elevated troponin.  At this moment the patient is asymptomatic.  I had a long discussion with the patient and she is DNR and which does not wish any aggressive intervention.  Discussed with Cardiology and they recommend medical management of ACS.  Heparin drip started.  Will admit for further cardiac management    Amount and/or Complexity of Data Reviewed  Labs: ordered.  Radiology: ordered and independent interpretation performed.     Details: Chest x-ray:  No acute process  ECG/medicine tests: ordered and independent interpretation performed.     Details: EKG:  Paced at 65  Discussion of management or test interpretation with external provider(s): Discussed with cardiology and they will observe in the hospital, discussed with internal medicine and they will admit     Risk  Drug therapy requiring intensive monitoring for toxicity.  Decision regarding hospitalization.        Critical Care    Date/Time: 3/22/2023 9:14 PM  Performed by: Jaquan Zacarias III, MD  Authorized by: Marco Antonio Hsieh MD   Total critical care time (exclusive of procedural time) : 30 minutes  Critical care time was exclusive of separately billable procedures and treating other patients.  Comments: Patient required numerous evaluations of her cardiopulmonary status during her course in the emergency department for her life-threatening chest pain that ultimately was secondary to an NSTEMI requiring heparin drip              ASSESSMENT  1. Chest pain, unspecified type    2. Chest pain    3. NSTEMI (non-ST elevated myocardial infarction)          Disposition:  Discharge    New Prescriptions    No medications on file     Modified Medications    No medications on file     Discontinued Medications    No medications on file          Jaquan Zacarias III, MD  03/22/23 8523

## 2023-03-23 NOTE — ASSESSMENT & PLAN NOTE
Unclear cause. Has hepatic steatosis on previous imaging. Possibly congestive hepatopathy in setting of NSTEMI.     - trend CMP  - no statin started  - consider viral hepatitis workup, RUQ US if rising  - avoid hepatotoxic agents

## 2023-03-23 NOTE — ASSESSMENT & PLAN NOTE
Presented with chest pain that is reproducible with palpation.   EKG showed: paced rhythm, no ischemic changes  Trop 8.14-->11.17, BNP 1022  MAR 3, [address] 132  Renal fxn: OFELIA, Cr 1.8    Plan:  - ACS protocol with aspirin, ticagrelor initially (later changed to plavix per cardiology recs due to hx of gastric ulcer), and heparin gtt  - DNR, refused cath  - TTE  - Trend trop to peak  - Lipid panel, TSH  - Statin: intolerance  - B-blocker: coreg  - ACEi/ARB: held lisinopril due to ofelia  - NTG/repeat EKG PRN for chest pain

## 2023-03-23 NOTE — ASSESSMENT & PLAN NOTE
Creatinine 1.8 on admit, baseline around 1.2  - Likely pre-renal from dehydration and volume losses  Lab Results   Component Value Date    CREATININE 1.8 (H) 03/22/2023       Plan:   - Check urine lytes and renal ultrasound if not improved with IVF  - Strict I&Os and daily weights   - Daily BMP  - Trend sCr  - Avoid nephrotoxic agents such as NSAIDs, gadolinium, ACEi, ARBs and IV radiocontrast.  - Renally dose meds to current GFR.  - Maintain MAP > 65.  - No indications for HD at this time.   - Maintain electrolytes at goal: Mg > 2, Phos > 3, K > 4.

## 2023-03-23 NOTE — PLAN OF CARE
[facility] - Cardiology Stepdown  Initial Discharge Assessment       Primary Care Provider: No primary care provider on file.    Admission Diagnosis: Non-ST elevation myocardial infarction (NSTEMI) [I21.4]  NSTEMI (non-ST elevation myocardial infarction) [I21.4]  NSTEMI (non-ST elevated myocardial infarction) [I21.4]  Chest pain [R07.9]  Chest pain, unspecified type [R07.9]    Admission Date: 3/22/2023  Expected Discharge Date:     Discharge Barriers Identified: None    Payor: [employer] MEDICARE / Plan: [employer] MEDICARE HMO / Product Type: Capitation /     Extended Emergency Contact Information  Primary Emergency Contact: Lyssa Peguero   [place]  Mobile Phone: [phone]  Relation: Relative  Secondary Emergency Contact: Tian Rizvi   [place]  Work Phone: [phone]  Mobile Phone: [phone]  Relation: Friend    Discharge Plan A: Home with family, Home Health  Discharge Plan B: Home with family      [facility] - [place] - [address]  [address]  [place] [address]  Phone: [phone] Fax: [phone]    [facility] - [place], [place] - [address]  [address] [address]  Phone: [phone] Fax: [phone]    [id number] - [place] - [address]  [address]  [place] [address]  Phone: [phone] Fax: [phone]      Initial Assessment (most recent)       Adult Discharge Assessment - 03/23/23 1343          Discharge Assessment    Assessment Type Discharge Planning Assessment     Confirmed/corrected address, phone number and insurance Yes     Confirmed Demographics Correct on Facesheet     Source of Information patient;family     Communicated WADE with patient/caregiver Date not available/Unable to determine     Reason For Admission NSTEMI     People in Home alone     Facility Arrived From: Home     Do you expect to return to your current living  situation? Yes     Do you have help at home or someone to help you manage your care at home? Yes     Who are your caregiver(s) and their phone number(s)? Lyssa Allen (Gabe Child) [phone]     Prior to hospitilization cognitive status: Alert/Oriented   has hearing aid but hard of hearing. Answers  appropriately.    Current cognitive status: Alert/Oriented     Walking or Climbing Stairs ambulation difficulty, requires equipment     Mobility Management uses quad cane / rolling walker. ( stated she doesnt like the walker if she does not need it. )     Dressing/Bathing bathing difficulty, assistance 1 person     Dressing/Bathing Management Has a transfer bench     Equipment Currently Used at Home walker, rolling;bath bench;cane, quad;grab bar;other (see comments)   blood pressure machine , and a pace maker maching to check pace maker.    Readmission within 30 days? No     Patient currently being followed by outpatient case management? No     Do you currently have service(s) that help you manage your care at home? No     Do you take prescription medications? Yes     Do you have prescription coverage? Yes     Coverage [employer]     Do you have any problems affording any of your prescribed medications? No     Is the patient taking medications as prescribed? yes     Who is going to help you get home at discharge? Lyssa Allen (Gabe Child) [phone]     How do you get to doctors appointments? family or friend will provide     Are you on dialysis? No     Do you take coumadin? No     Discharge Plan A Home with family;Home Health     Discharge Plan B Home with family     DME Needed Upon Discharge  none     Discharge Plan discussed with: Patient   and God Child    Discharge Barriers Identified None        Financial Resource Strain    How hard is it for you to pay for the very basics like food, housing, medical care, and heating? Not hard at all        Housing Stability    In the last 12 months, was there a time  when you were not able to pay the mortgage or rent on time? No     In the last 12 months, how many places have you lived? 1     In the last 12 months, was there a time when you did not have a steady place to sleep or slept in a shelter (including now)? No        Transportation Needs    In the past 12 months, has lack of transportation kept you from medical appointments or from getting medications? No     In the past 12 months, has lack of transportation kept you from meetings, work, or from getting things needed for daily living? No        Food Insecurity    Within the past 12 months, you worried that your food would run out before you got the money to buy more. Never true     Within the past 12 months, the food you bought just didn't last and you didn't have money to get more. Never true                   Patient lying in bed daughter at  bedside Verified PCP and insurance and pharmacy. Plan is to return home. Uses a cane for ambulation and refused to use her rolling walker . Will continue to monitor for discharge needs.     Kirsten Chatman RN    [phone]

## 2023-03-23 NOTE — PLAN OF CARE
Saw the patient at bedside and she has not been having chest pain this morning.  She does appear to have significant short-term memory loss.  She is able have a full conversation with me, but the conversations starts to go in circles.  She is able to tell me distant medical history, but not much about her more current medical history.      I spoke on the phone with her niece Lyssa Peguero and the patient's baseline is reportedly fully functional.  She states the patient is , has no living siblings, has no living children, and she is next of kin.  She also has HC POA but paperwork is not in the chart.  She has paperwork at home and will bring it in.  We discussed the patient's NSTEMI as well as pros and cons of taking her to the cath lab for potential PCI.  I am not sure she is a good candidate due to her likely underlying neurocognitive deficits and inability to take medication properly.  Even if a good candidate, the patient's knees as well as the patient would like to avoid invasive procedures.  Confirmed she is a DNR.    Troponin uptrended from 8 on admission to around 15. Stopped trending due to continued medical management without active chest pain.    TTE on 3/23 with EF 35% (no known prior HF), segmental wall motion abnormalities. Elevated pulmonary pressures, moderate TR, CVP 15.     - Cardiology recs appreciated  - Medical management of NSTEMI per ACS protocol   - Heparin gtt 48 hours   - ASA, Brilinta   - Uptitrated Coreg with goal HR 60 and for better BP control   - Held ACE in the setting of OFELIA  - 2 doses of IV diuresis today. Start with 40mg and follow volume status/UOP  - GDMT for heart failure prior to d/c. Possible initiation of entresto when renal function improves. Can d/c amlodipine if blood pressure prohibits uptitration of HFrEF GDMT.    Connor M. Gillies, DO  PGY-3  Internal Medicine

## 2023-03-23 NOTE — PHARMACY MED REC
"  Admission Medication History     The home medication history was taken by Ean Cardoso.    You may go to "Admission" then "Reconcile Home Medications" tabs to review and/or act upon these items.     The home medication list has been updated by the Pharmacy department.   Please read ALL comments highlighted in yellow.   Please address this information as you see fit.    Feel free to contact us if you have any questions or require assistance.      The medications listed below were removed from the home medication list. Please reorder if appropriate:  Patient reports no longer taking the following medication(s):  BRIMONIDINE 0.15 % OPTH DROP (ALPHAGAN)  CALCITONIN, SALMON, (FORTICAL) 200 UNIT/ACT NS  CYCLOSPORINE (RESTASIS) 0.05 % OPHT EMULS  FUROSEMIDE 20 MG TAB  FLUTICASONE 50 MCG/ACT NS      Medications listed below were obtained from: Patient and Analytic software- [employer]  [facility] Medications   Medication Sig    ALPRAZolam (XANAX) 0.25 MG tablet Take 1 tablet (0.25 mg) by mouth daily as needed for Anxiety or Insomnia.)      BIOTIN ORAL   Take 1,000 mg by mouth once daily.    clopidogreL (PLAVIX) 75 mg tablet   Take 75 mg by mouth 3 (three) times a week.      coenzyme Q10 10 mg capsule   Take 10 mg by mouth once daily.    diclofenac sodium   (VOLTAREN) 1 % Gel   Apply 2 g topically daily as needed for pain.    dorzolamide (TRUSOPT) 2 % ophthalmic solution   Place 1 drop into both eyes 2 (two) times a day.    ergocalciferol, vitamin D2, (VITAMIN D ORAL)   Take 50 mcg by mouth once daily.    ezetimibe (ZETIA) 10 mg tablet TAKE 1 TABLET (10 MG TOTAL) BY MOUTH EVERY EVENING.      fexofenadine (ALLEGRA) 60 MG tablet   Take 60 mg by mouth daily as needed (Allergies).    furosemide (LASIX) 20 MG tablet   Take 1 tablet (20 mg total) by mouth once daily.    gabapentin (NEURONTIN) 100 MG capsule Take 100 mg by mouth 2 (two) times daily as needed (Pain).      latanoprost 0.005 % ophthalmic solution   INSTILL 1 DROP INTO BOTH EYES " EVERY EVENING.     levothyroxine (SYNTHROID) 50 MCG tablet   TAKE ONE TABLET BY MOUTH EVERY DAY BEFORE BREAKFAST.    LIDOcaine (LIDODERM) 5 % Place 1 patch onto the skin daily as needed (Pain). Remove & Discard patch within 12 hours or as directed by MD.      lisinopriL (PRINIVIL,ZESTRIL) 20 MG tablet   TAKE 1 TABLET EVERY DAY.    pantoprazole (PROTONIX) 40 MG tablet Take 1 tablet (40 mg total) by mouth once daily.       Potential issues to be addressed PRIOR TO DISCHARGE  Patient requested refills for the following medications: (SYNTHROID)        Ean Cardoso  EXT [id number]                .

## 2023-03-23 NOTE — ASSESSMENT & PLAN NOTE
Pt presenting with chest pain with elevated troponin to 8.  No EKG changes.  Bedside TTE deferred per pt request.  - recommend medical management  - ok for ACS protocol.  Would use plavix instead of brillinta given age and her h/o GIBs.  Agree with heparin gtt  - I discussed with the pt that she had a heart attack and inquired about angiography.  She does not want any procedures performed (especially angiography) and she is DNR  - no indication for stress testing in this pt as no procedure will be performed if + stress test  - she is euvolemic right now but unable to evaluate IVC  - AM team to consider starting beta blocker for angina and controlling blood pressure  - on zetia only and LDL 85.  Can discuss with pt statin therapy if she is willing given her age  - echo

## 2023-03-23 NOTE — ASSESSMENT & PLAN NOTE
Denies taking sertraline and escitalopram listed on her home med printout sheet. Only takes xanax 0.25 prn at night to sleep. Has been out of that prescription for a month.     - low risk of benzo withdrawal  - xanax 0.25 prn

## 2023-03-23 NOTE — PLAN OF CARE
Patient laying in bed resting comfortably at this time in no acute distress. She denies any chest pain or shortness of breath and vital signs are stable. Patient is currently on heparin dtt going at 12 units. Safety measures in place and call bell within reach.     Problem: Adult Inpatient Plan of Care  Goal: Plan of Care Review  Outcome: Ongoing, Progressing  Goal: Patient-Specific Goal (Individualized)  Outcome: Ongoing, Progressing  Goal: Absence of Hospital-Acquired Illness or Injury  Outcome: Ongoing, Progressing  Goal: Optimal Comfort and Wellbeing  Outcome: Ongoing, Progressing  Goal: Readiness for Transition of Care  Outcome: Ongoing, Progressing     Problem: Adjustment to Illness (Acute Coronary Syndrome)  Goal: Optimal Adaptation to Illness  Outcome: Ongoing, Progressing     Problem: Dysrhythmia (Acute Coronary Syndrome)  Goal: Normalized Cardiac Rhythm  Outcome: Ongoing, Progressing     Problem: Cardiac-Related Pain (Acute Coronary Syndrome)  Goal: Absence of Cardiac-Related Pain  Outcome: Ongoing, Progressing     Problem: Hemodynamic Instability (Acute Coronary Syndrome)  Goal: Effective Cardiac Pump Function  Outcome: Ongoing, Progressing     Problem: Tissue Perfusion (Acute Coronary Syndrome)  Goal: Adequate Tissue Perfusion  Outcome: Ongoing, Progressing     Problem: Arrhythmia/Dysrhythmia (Cardiac Catheterization)  Goal: Stable Heart Rate and Rhythm  Outcome: Ongoing, Progressing     Problem: Bleeding (Cardiac Catheterization)  Goal: Absence of Bleeding  Outcome: Ongoing, Progressing     Problem: Contrast-Induced Injury Risk (Cardiac Catheterization)  Goal: Absence of Contrast-Induced Injury  Outcome: Ongoing, Progressing     Problem: Embolism (Cardiac Catheterization)  Goal: Absence of Embolism Signs and Symptoms  Outcome: Ongoing, Progressing     Problem: Ongoing Anesthesia/Sedation Effects (Cardiac Catheterization)  Goal: Anesthesia/Sedation Recovery  Outcome: Ongoing, Progressing     Problem:  Pain (Cardiac Catheterization)  Goal: Acceptable Pain Control  Outcome: Ongoing, Progressing     Problem: Vascular Access Protection (Cardiac Catheterization)  Goal: Absence of Vascular Access Complication  Outcome: Ongoing, Progressing     Problem: Fluid and Electrolyte Imbalance (Acute Kidney Injury/Impairment)  Goal: Fluid and Electrolyte Balance  Outcome: Ongoing, Progressing     Problem: Oral Intake Inadequate (Acute Kidney Injury/Impairment)  Goal: Optimal Nutrition Intake  Outcome: Ongoing, Progressing     Problem: Renal Function Impairment (Acute Kidney Injury/Impairment)  Goal: Effective Renal Function  Outcome: Ongoing, Progressing     Problem: Skin Injury Risk Increased  Goal: Skin Health and Integrity  Outcome: Ongoing, Progressing

## 2023-03-23 NOTE — CONSULTS
[facility] - Emergency Dept  Cardiology  Consult Note    Patient Name: Cat Bolivar  MRN: [id number]  Admission Date: 3/22/2023  Hospital Length of Stay: 1 days  Code Status: DNR   Attending Provider: Marco Antonio Hsieh MD   Consulting Provider: Jason Demarco MD  Primary Care Physician: No primary care provider on file.  Principal Problem:<principal problem not specified>    Patient information was obtained from patient and ER records.     Inpatient consult to Cardiology  Consult performed by: Jason Demarco MD  Consult ordered by: Lan Middleton MD        Subjective:     Chief Complaint:  NSTEMI     HPI:   89yo F with pmhx of CAD s/p PCI (2004), fibromyalgia, hypothyroidism who presents to the ED with chest pain    Pt is sleeping and does not want to talk so called family.  Per god-daughter (her only care taker) she complained of some chest pain at rest yesterday night.  She attributed this to her fibromyalgia.  She ambulates with a cane and denies any chest pain with activity.  This AM had chest pain again at rest and went to lie down.  Family was concerned and pt was brought to the ED    In the ED pt was AF HTN to 170-180s systolic on RA.  CMP shows a Cr of 1.8 (baseline around 1.2).  Trop was elevated to 8.  BNP 1000 (higher than previous).  CXR negative for pulmonary edema.  EKG shows a v paced rhythm.  I evaluated the pt at the bedside.  Cardiology was consulted for evaluation.  She said she did not have any chest pain but her family thinks she did.  She denies any pain now, orthopnea, LE swelling, or Orozco.  Unclear if given SL NTG.  Would not let me do a bedside TTE because she was tired.  Of note she is DNR and does NOT want any procedures performed (verified with family)      Past Medical History:   Diagnosis Date    Carotid artery disease     Cervical spondylosis without myelopathy 4/28/2014    Fibromyalgia     Fracture of wrist     Fracture, ankle     both at diff times    Glaucoma      Hyperparathyroidism     Hypothyroidism     Osteoporosis, post-menopausal     Unspecified vitamin D deficiency 10/30/2012       Past Surgical History:   Procedure Laterality Date    APPENDECTOMY      Bilateral Levator Repair  7/03    [facility] (S[facility]eta Still)    CARDIAC PACEMAKER PLACEMENT      carotid endarterectomy      CATARACT EXTRACTION W/  INTRAOCULAR LENS IMPLANT Bilateral 1990's        CATARACT EXTRACTION W/ INTRAOCULAR LENS IMPLANTW/ TRABECULECTOMY Right 03/1994    [facility] ( Dr. Ott)    CORONARY ANGIOPLASTY WITH STENT PLACEMENT      ESOPHAGOGASTRODUODENOSCOPY N/A 5/31/2021    Procedure: EGD (ESOPHAGOGASTRODUODENOSCOPY);  Surgeon: Brooks Sanchez MD;  Location: [facility] ([address] [place] [address]);  Service: Endoscopy;  Laterality: N/A;    HYSTERECTOMY      Ptosis Revision Left 10/2003    [facility] (Dr. Ott)    RETINAL DETACHMENT SURGERY  2/02    [facility] (Dr. Mckeon)    thyroid nodule      WRIST SURGERY         Review of patient's allergies indicates:   Allergen Reactions    Aspirin      Other reaction(s): HEADACHES    Citalopram      tachycardia    Codeine Other (See Comments)     Dizziness    Cytotec [misoprostol] Other (See Comments)     Unknown      Feldene [piroxicam] Other (See Comments)     unknown    Gentamicin Other (See Comments)     Flushing      Indocin [indomethacin sodium] Other (See Comments)     Unknown      Motrin [ibuprofen] Nausea And Vomiting    Paxil [paroxetine hcl] Other (See Comments)     Suicidal feelings    Prozac [fluoxetine] Other (See Comments)     Suicidal feeling    Tofranil [imipramine hcl] Other (See Comments)     Feeling restless    Vancomycin analogues Other (See Comments)     Unknown      Elavil [amitriptyline] Palpitations    Penicillins Rash     Other reaction(s): RASH       No current facility-administered medications on file prior to encounter.     Current Outpatient Medications on File Prior to Encounter   Medication Sig    ALPRAZolam (XANAX) 0.25 MG tablet Take 1 tablet  (0.25 mg total) by mouth daily as needed for Anxiety.    ergocalciferol, vitamin D2, (VITAMIN D ORAL) Take 50 mcg by mouth once daily.    fexofenadine (ALLEGRA) 60 MG tablet Take 60 mg by mouth as needed.     furosemide (LASIX) 20 MG tablet Take 1 tablet (20 mg total) by mouth once daily.    gabapentin (NEURONTIN) 100 MG capsule TAKE 1 CAPSULE TWICE DAILY    lisinopriL (PRINIVIL,ZESTRIL) 20 MG tablet TAKE 1 TABLET EVERY DAY    BIOTIN ORAL Take 1,000 mg by mouth once daily.    brimonidine 0.15 % OPTH DROP (ALPHAGAN) 0.15 % ophthalmic solution Place 1 drop into both eyes 2 (two) times a day.    calcitonin, salmon, (FORTICAL) 200 unit/actuation nasal spray 1 spray by Nasal route once daily.    clopidogreL (PLAVIX) 75 mg tablet TAKE 1 TABLET (75 MG TOTAL) BY MOUTH FOUR TIMES A WEEK.    coenzyme Q10 10 mg capsule Take 10 mg by mouth once daily.    cycloSPORINE (RESTASIS) 0.05 % ophthalmic emulsion Place 0.4 mLs (1 drop total) into both eyes 2 (two) times daily.    diclofenac sodium (VOLTAREN) 1 % Gel Apply 2 g topically once daily. (Patient taking differently: Apply 2 g topically as needed.)    dorzolamide (TRUSOPT) 2 % ophthalmic solution Place 1 drop into both eyes 3 (three) times daily. (Patient taking differently: Place 1 drop into both eyes 2 (two) times a day.)    ezetimibe (ZETIA) 10 mg tablet TAKE 1 TABLET (10 MG TOTAL) BY MOUTH EVERY EVENING.    fluticasone (FLONASE) 50 mcg/actuation nasal spray 1 spray by Nasal route once daily.     latanoprost 0.005 % ophthalmic solution INSTILL 1 DROP INTO BOTH EYES EVERY EVENING.     levothyroxine (SYNTHROID) 50 MCG tablet TAKE ONE TABLET BY MOUTH EVERY DAY BEFORE BREAKFAST    LIDOcaine (LIDODERM) 5 % Place 1 patch onto the skin once daily. Remove & Discard patch within 12 hours or as directed by MD    pantoprazole (PROTONIX) 40 MG tablet Take 1 tablet (40 mg total) by mouth once daily.     Family History       Problem Relation (Age of Onset)    Glaucoma  Maternal Grandfather    Goiter Mother    Retinal detachment Brother    Urolithiasis Father          Tobacco Use    Smoking status: Never    Smokeless tobacco: Never   Substance and Sexual Activity    Alcohol use: No    Drug use: Not on file    Sexual activity: Not on file     Review of Systems   Constitutional: Negative for fever and malaise/fatigue.   HENT:  Negative for congestion and sore throat.    Eyes:  Negative for blurred vision, vision loss in left eye and vision loss in right eye.   Cardiovascular:  Positive for chest pain. Negative for dyspnea on exertion, irregular heartbeat, leg swelling, near-syncope, palpitations and syncope.   Respiratory:  Negative for shortness of breath and wheezing.    Endocrine: Negative.    Hematologic/Lymphatic: Negative.    Skin: Negative.    Musculoskeletal:  Negative for arthritis, back pain, joint pain and myalgias.   Gastrointestinal:  Negative for abdominal pain, hematemesis, hematochezia and melena.   Genitourinary: Negative.    Neurological:  Negative for light-headedness and loss of balance.   Psychiatric/Behavioral: Negative.     Objective:     Vital Signs (Most Recent):  Temp: 98.9 °F (37.2 °C) (03/22/23 1917)  Pulse: 65 (03/22/23 2302)  Resp: (!) 23 (03/22/23 2302)  BP: (!) 182/66 (03/22/23 2302)  SpO2: 99 % (03/22/23 2302)   Vital Signs (24h Range):  Temp:  [98.9 °F (37.2 °C)] 98.9 °F (37.2 °C)  Pulse:  [64-65] 65  Resp:  [18-31] 23  SpO2:  [99 %-100 %] 99 %  BP: (166-182)/(66-86) 182/66     Weight: 49.9 kg (110 lb)  Body mass index is 20.12 kg/m².    SpO2: 99 %       No intake or output data in the 24 hours ending 03/23/23 0014    Lines/Drains/Airways       Peripheral Intravenous Line  Duration                  Peripheral IV - Single Lumen 03/22/23 2202 22 G Left Hand <1 day         Peripheral IV - Single Lumen 03/22/23 2352 20 G Anterior;Distal;Right Wrist <1 day                    Physical Exam  Vitals and nursing note reviewed.   Constitutional:        Appearance: Normal appearance. She is normal weight.   HENT:      Head: Atraumatic.      Mouth/Throat:      Mouth: Mucous membranes are moist.   Eyes:      General: No scleral icterus.     Extraocular Movements: Extraocular movements intact.   Neck:      Vascular: No carotid bruit.   Cardiovascular:      Rate and Rhythm: Normal rate and regular rhythm.      Pulses: Normal pulses.      Heart sounds: Normal heart sounds. No murmur heard.    No gallop.   Pulmonary:      Effort: Pulmonary effort is normal. No respiratory distress.      Breath sounds: Normal breath sounds. No wheezing.   Abdominal:      General: Abdomen is flat. Bowel sounds are normal.      Palpations: Abdomen is soft.   Musculoskeletal:         General: Normal range of motion.      Right lower leg: No edema.      Left lower leg: No edema.   Skin:     General: Skin is warm and dry.      Capillary Refill: Capillary refill takes less than 2 seconds.   Neurological:      General: No focal deficit present.      Mental Status: She is alert and oriented to person, place, and time. Mental status is at baseline.       Significant Labs: All pertinent lab results from the last 24 hours have been reviewed.    Significant Imaging: Echocardiogram: Transthoracic echo (TTE) complete (Cupid Only): No results found for this or any previous visit.    Assessment and Plan:     NSTEMI (non-ST elevated myocardial infarction)  Pt presenting with chest pain with elevated troponin to 8.  No EKG changes.  Bedside TTE deferred per pt request.  - recommend medical management  - ok for ACS protocol.  Would use plavix instead of brillinta given age and her h/o GIBs.  Agree with heparin gtt  - I discussed with the pt that she had a heart attack and inquired about angiography.  She does not want any procedures performed (especially angiography) and she is DNR  - no indication for stress testing in this pt as no procedure will be performed if + stress test  - she is euvolemic right now  but unable to evaluate IVC  - AM team to consider starting beta blocker for angina and controlling blood pressure  - on zetia only and LDL 85.  Can discuss with pt statin therapy if she is willing given her age  - echo        VTE Risk Mitigation (From admission, onward)         Ordered     heparin 25,000 units in dextrose 5% (100 units/ml) IV bolus from bag - ADDITIONAL PRN BOLUS - 60 units/kg (max bolus 4000 units)  As needed (PRN)        Question:  Heparin Infusion Adjustment (DO NOT MODIFY ANSWER)  Answer:  [email or web address]\epic\Images\Pharmacy\HeparinInfusions\heparin LOW INTENSITY nomogram for [facility] [id number].pdf    03/22/23 2109     heparin 25,000 units in dextrose 5% (100 units/ml) IV bolus from bag - ADDITIONAL PRN BOLUS - 30 units/kg (max bolus 4000 units)  As needed (PRN)        Question:  Heparin Infusion Adjustment (DO NOT MODIFY ANSWER)  Answer:  [email or web address]\epic\Images\Pharmacy\HeparinInfusions\heparin LOW INTENSITY nomogram for [facility] [id number].pdf    03/22/23 2109     heparin 25,000 units in dextrose 5% 250 mL (100 units/mL) infusion LOW INTENSITY nomogram - [facility]  Continuous        Question Answer Comment   Heparin Infusion Adjustment (DO NOT MODIFY ANSWER) \\[email or web address]\epic\Images\Pharmacy\HeparinInfusions\heparin LOW INTENSITY nomogram for [facility] [id number].pdf    Begin at (in units/kg/hr) 12        03/22/23 2109     IP VTE HIGH RISK PATIENT  Once         03/22/23 2259     Place sequential compression device  Until discontinued         03/22/23 2259     Place sequential compression device  Until discontinued         03/22/23 2140     Reason for no Mechanical VTE Prophylaxis  Once        Question:  Reasons:  Answer:  Physician Provided (leave comment)  Comment:  on heparin drip    03/22/23 2140                Thank you for your consult. I will follow-up with patient. Please contact us if you have any additional questions.    Jason Demarco MD  Cardiology   [facility] - Emergency Dept

## 2023-03-23 NOTE — ASSESSMENT & PLAN NOTE
Takes lisinopril at home. Held due to OFELIA. Home Bps normal. Hypertensive in ED.     - started on amlodipine and coreg

## 2023-03-23 NOTE — ASSESSMENT & PLAN NOTE
Elevated bnp of 1022, previously normal. Denies previously dx of heart failure. No echo found on file. CXR clear. On room air. Takes prn Lasix 20 mg and aldactone 12.5 prn.     - echo ordered  - Cards unable to do beside US since patient was tired, unable to assess IVC  - not clinically volume overloaded, will hold for now

## 2023-03-23 NOTE — SUBJECTIVE & OBJECTIVE
Past Medical History:   Diagnosis Date    Carotid artery disease     Cervical spondylosis without myelopathy 4/28/2014    Fibromyalgia     Fracture of wrist     Fracture, ankle     both at diff times    Glaucoma     Hyperparathyroidism     Hypothyroidism     Osteoporosis, post-menopausal     Unspecified vitamin D deficiency 10/30/2012       Past Surgical History:   Procedure Laterality Date    APPENDECTOMY      Bilateral Levator Repair  7/03    [facility] (Sangeeta Still)    CARDIAC PACEMAKER PLACEMENT      carotid endarterectomy      CATARACT EXTRACTION W/  INTRAOCULAR LENS IMPLANT Bilateral 1990's        CATARACT EXTRACTION W/ INTRAOCULAR LENS IMPLANTW/ TRABECULECTOMY Right 03/1994    [facility] ( Dr. Ott)    CORONARY ANGIOPLASTY WITH STENT PLACEMENT      ESOPHAGOGASTRODUODENOSCOPY N/A 5/31/2021    Procedure: EGD (ESOPHAGOGASTRODUODENOSCOPY);  Surgeon: Brooks Sanchez MD;  Location: [address]);  Service: Endoscopy;  Laterality: N/A;    HYSTERECTOMY      Ptosis Revision Left 10/2003    [facility] (Dr. Ott)    RETINAL DETACHMENT SURGERY  2/02    [facility] (Dr. Mckeon)    thyroid nodule      WRIST SURGERY         Review of patient's allergies indicates:   Allergen Reactions    Aspirin      Other reaction(s): HEADACHES    Citalopram      tachycardia    Codeine Other (See Comments)     Dizziness    Cytotec [misoprostol] Other (See Comments)     Unknown      Feldene [piroxicam] Other (See Comments)     unknown    Gentamicin Other (See Comments)     Flushing      Indocin [indomethacin sodium] Other (See Comments)     Unknown      Motrin [ibuprofen] Nausea And Vomiting    Paxil [paroxetine hcl] Other (See Comments)     Suicidal feelings    Prozac [fluoxetine] Other (See Comments)     Suicidal feeling    Tofranil [imipramine hcl] Other (See Comments)     Feeling restless    Vancomycin analogues Other (See Comments)     Unknown      Elavil [amitriptyline] Palpitations    Penicillins Rash     Other reaction(s): RASH       No current  facility-administered medications on file prior to encounter.     Current Outpatient Medications on File Prior to Encounter   Medication Sig    ALPRAZolam (XANAX) 0.25 MG tablet Take 1 tablet (0.25 mg total) by mouth daily as needed for Anxiety.    ergocalciferol, vitamin D2, (VITAMIN D ORAL) Take 50 mcg by mouth once daily.    fexofenadine (ALLEGRA) 60 MG tablet Take 60 mg by mouth as needed.     furosemide (LASIX) 20 MG tablet Take 1 tablet (20 mg total) by mouth once daily.    gabapentin (NEURONTIN) 100 MG capsule TAKE 1 CAPSULE TWICE DAILY    lisinopriL (PRINIVIL,ZESTRIL) 20 MG tablet TAKE 1 TABLET EVERY DAY    BIOTIN ORAL Take 1,000 mg by mouth once daily.    brimonidine 0.15 % OPTH DROP (ALPHAGAN) 0.15 % ophthalmic solution Place 1 drop into both eyes 2 (two) times a day.    calcitonin, salmon, (FORTICAL) 200 unit/actuation nasal spray 1 spray by Nasal route once daily.    clopidogreL (PLAVIX) 75 mg tablet TAKE 1 TABLET (75 MG TOTAL) BY MOUTH FOUR TIMES A WEEK.    coenzyme Q10 10 mg capsule Take 10 mg by mouth once daily.    cycloSPORINE (RESTASIS) 0.05 % ophthalmic emulsion Place 0.4 mLs (1 drop total) into both eyes 2 (two) times daily.    diclofenac sodium (VOLTAREN) 1 % Gel Apply 2 g topically once daily. (Patient taking differently: Apply 2 g topically as needed.)    dorzolamide (TRUSOPT) 2 % ophthalmic solution Place 1 drop into both eyes 3 (three) times daily. (Patient taking differently: Place 1 drop into both eyes 2 (two) times a day.)    ezetimibe (ZETIA) 10 mg tablet TAKE 1 TABLET (10 MG TOTAL) BY MOUTH EVERY EVENING.    fluticasone (FLONASE) 50 mcg/actuation nasal spray 1 spray by Nasal route once daily.     latanoprost 0.005 % ophthalmic solution INSTILL 1 DROP INTO BOTH EYES EVERY EVENING.     levothyroxine (SYNTHROID) 50 MCG tablet TAKE ONE TABLET BY MOUTH EVERY DAY BEFORE BREAKFAST    LIDOcaine (LIDODERM) 5 % Place 1 patch onto the skin once daily. Remove & Discard patch within 12 hours or as  directed by MD    pantoprazole (PROTONIX) 40 MG tablet Take 1 tablet (40 mg total) by mouth once daily.     Family History       Problem Relation (Age of Onset)    Glaucoma Maternal Grandfather    Goiter Mother    Retinal detachment Brother    Urolithiasis Father          Tobacco Use    Smoking status: Never    Smokeless tobacco: Never   Substance and Sexual Activity    Alcohol use: No    Drug use: Not on file    Sexual activity: Not on file     Review of Systems   Constitutional:  Negative for chills, diaphoresis and fever.   HENT:  Negative for congestion and drooling.    Eyes:  Negative for photophobia and visual disturbance.   Respiratory:  Negative for shortness of breath and wheezing.    Cardiovascular:  Positive for chest pain. Negative for leg swelling.   Gastrointestinal:  Negative for abdominal distention, abdominal pain, constipation, diarrhea, nausea and vomiting.   Genitourinary:  Negative for decreased urine volume, dysuria and hematuria.   Musculoskeletal:  Positive for myalgias. Negative for gait problem.   Skin:  Negative for wound.   Neurological:  Negative for speech difficulty and light-headedness.   Psychiatric/Behavioral:  Negative for agitation and behavioral problems.    Objective:     Vital Signs (Most Recent):  Temp: 98.9 °F (37.2 °C) (03/22/23 1917)  Pulse: 65 (03/22/23 2302)  Resp: (!) 23 (03/22/23 2302)  BP: (!) 182/66 (03/22/23 2302)  SpO2: 99 % (03/22/23 2302)   Vital Signs (24h Range):  Temp:  [98.9 °F (37.2 °C)] 98.9 °F (37.2 °C)  Pulse:  [64-65] 65  Resp:  [18-31] 23  SpO2:  [99 %-100 %] 99 %  BP: (166-182)/(66-86) 182/66     Weight: 49.9 kg (110 lb)  Body mass index is 20.12 kg/m².    Physical Exam  Constitutional:       General: She is not in acute distress.     Appearance: She is normal weight. She is not ill-appearing.   HENT:      Head: Normocephalic and atraumatic.      Right Ear: Decreased hearing noted.      Left Ear: Decreased hearing noted.      Ears:      Comments: Hard  of hearing     Nose: Nose normal.      Mouth/Throat:      Mouth: Mucous membranes are moist.   Eyes:      General: No scleral icterus.  Cardiovascular:      Rate and Rhythm: Normal rate and regular rhythm.      Pulses: Normal pulses.      Heart sounds: No murmur heard.    No gallop.   Pulmonary:      Effort: Pulmonary effort is normal. No respiratory distress.      Breath sounds: Normal breath sounds. No wheezing or rales.   Abdominal:      General: Abdomen is flat. Bowel sounds are normal. There is no distension.      Palpations: Abdomen is soft.      Tenderness: There is no abdominal tenderness. There is no guarding.   Musculoskeletal:      Right lower leg: No edema.      Left lower leg: No edema.   Skin:     General: Skin is warm and dry.      Capillary Refill: Capillary refill takes less than 2 seconds.      Coloration: Skin is not jaundiced.   Neurological:      General: No focal deficit present.      Mental Status: She is alert and oriented to person, place, and time.   Psychiatric:         Mood and Affect: Mood normal.         Behavior: Behavior normal.           Significant Labs: All pertinent labs within the past 24 hours have been reviewed.  A1C:   Recent Labs   Lab 03/22/23 1953   HGBA1C 6.0*     CBC:   Recent Labs   Lab 03/22/23 1953 03/22/23 2158   WBC 5.40 4.95   HGB 11.7* 10.3*   HCT 35.3* 31.6*    215     CMP:   Recent Labs   Lab 03/22/23 1953   *   K 4.6      CO2 23      BUN 40*   CREATININE 1.8*   CALCIUM 9.1   PROT 8.7*   ALBUMIN 3.2*   BILITOT 0.3   ALKPHOS 90   *   *   ANIONGAP 9     Cardiac Markers:   Recent Labs   Lab 03/22/23 1953   BNP 1,022*     Lipid Panel:   Recent Labs   Lab 03/22/23 1953   CHOL 175   HDL 64   LDLCALC 85.6   TRIG 127   CHOLHDL 36.6     Troponin:   Recent Labs   Lab 03/22/23 1953   TROPONINI 8.141*       Significant Imaging: I have reviewed all pertinent imaging results/findings within the past 24 hours.

## 2023-03-23 NOTE — SUBJECTIVE & OBJECTIVE
Past Medical History:   Diagnosis Date    Carotid artery disease     Cervical spondylosis without myelopathy 4/28/2014    Fibromyalgia     Fracture of wrist     Fracture, ankle     both at diff times    Glaucoma     Hyperparathyroidism     Hypothyroidism     Osteoporosis, post-menopausal     Unspecified vitamin D deficiency 10/30/2012       Past Surgical History:   Procedure Laterality Date    APPENDECTOMY      Bilateral Levator Repair  7/03    [facility] (Sangeeta Still)    CARDIAC PACEMAKER PLACEMENT      carotid endarterectomy      CATARACT EXTRACTION W/  INTRAOCULAR LENS IMPLANT Bilateral 1990's        CATARACT EXTRACTION W/ INTRAOCULAR LENS IMPLANTW/ TRABECULECTOMY Right 03/1994    [facility] ( Dr. Ott)    CORONARY ANGIOPLASTY WITH STENT PLACEMENT      ESOPHAGOGASTRODUODENOSCOPY N/A 5/31/2021    Procedure: EGD (ESOPHAGOGASTRODUODENOSCOPY);  Surgeon: Brooks Sanchez MD;  Location: [address]);  Service: Endoscopy;  Laterality: N/A;    HYSTERECTOMY      Ptosis Revision Left 10/2003    [facility] (Dr. Ott)    RETINAL DETACHMENT SURGERY  2/02    [facility] (Dr. Mckeon)    thyroid nodule      WRIST SURGERY         Review of patient's allergies indicates:   Allergen Reactions    Aspirin      Other reaction(s): HEADACHES    Citalopram      tachycardia    Codeine Other (See Comments)     Dizziness    Cytotec [misoprostol] Other (See Comments)     Unknown      Feldene [piroxicam] Other (See Comments)     unknown    Gentamicin Other (See Comments)     Flushing      Indocin [indomethacin sodium] Other (See Comments)     Unknown      Motrin [ibuprofen] Nausea And Vomiting    Paxil [paroxetine hcl] Other (See Comments)     Suicidal feelings    Prozac [fluoxetine] Other (See Comments)     Suicidal feeling    Tofranil [imipramine hcl] Other (See Comments)     Feeling restless    Vancomycin analogues Other (See Comments)     Unknown      Elavil [amitriptyline] Palpitations    Penicillins Rash     Other reaction(s): RASH       No current  facility-administered medications on file prior to encounter.     Current Outpatient Medications on File Prior to Encounter   Medication Sig    ALPRAZolam (XANAX) 0.25 MG tablet Take 1 tablet (0.25 mg total) by mouth daily as needed for Anxiety.    ergocalciferol, vitamin D2, (VITAMIN D ORAL) Take 50 mcg by mouth once daily.    fexofenadine (ALLEGRA) 60 MG tablet Take 60 mg by mouth as needed.     furosemide (LASIX) 20 MG tablet Take 1 tablet (20 mg total) by mouth once daily.    gabapentin (NEURONTIN) 100 MG capsule TAKE 1 CAPSULE TWICE DAILY    lisinopriL (PRINIVIL,ZESTRIL) 20 MG tablet TAKE 1 TABLET EVERY DAY    BIOTIN ORAL Take 1,000 mg by mouth once daily.    brimonidine 0.15 % OPTH DROP (ALPHAGAN) 0.15 % ophthalmic solution Place 1 drop into both eyes 2 (two) times a day.    calcitonin, salmon, (FORTICAL) 200 unit/actuation nasal spray 1 spray by Nasal route once daily.    clopidogreL (PLAVIX) 75 mg tablet TAKE 1 TABLET (75 MG TOTAL) BY MOUTH FOUR TIMES A WEEK.    coenzyme Q10 10 mg capsule Take 10 mg by mouth once daily.    cycloSPORINE (RESTASIS) 0.05 % ophthalmic emulsion Place 0.4 mLs (1 drop total) into both eyes 2 (two) times daily.    diclofenac sodium (VOLTAREN) 1 % Gel Apply 2 g topically once daily. (Patient taking differently: Apply 2 g topically as needed.)    dorzolamide (TRUSOPT) 2 % ophthalmic solution Place 1 drop into both eyes 3 (three) times daily. (Patient taking differently: Place 1 drop into both eyes 2 (two) times a day.)    ezetimibe (ZETIA) 10 mg tablet TAKE 1 TABLET (10 MG TOTAL) BY MOUTH EVERY EVENING.    fluticasone (FLONASE) 50 mcg/actuation nasal spray 1 spray by Nasal route once daily.     latanoprost 0.005 % ophthalmic solution INSTILL 1 DROP INTO BOTH EYES EVERY EVENING.     levothyroxine (SYNTHROID) 50 MCG tablet TAKE ONE TABLET BY MOUTH EVERY DAY BEFORE BREAKFAST    LIDOcaine (LIDODERM) 5 % Place 1 patch onto the skin once daily. Remove & Discard patch within 12 hours or as  directed by MD    pantoprazole (PROTONIX) 40 MG tablet Take 1 tablet (40 mg total) by mouth once daily.     Family History       Problem Relation (Age of Onset)    Glaucoma Maternal Grandfather    Goiter Mother    Retinal detachment Brother    Urolithiasis Father          Tobacco Use    Smoking status: Never    Smokeless tobacco: Never   Substance and Sexual Activity    Alcohol use: No    Drug use: Not on file    Sexual activity: Not on file     Review of Systems   Constitutional: Negative for fever and malaise/fatigue.   HENT:  Negative for congestion and sore throat.    Eyes:  Negative for blurred vision, vision loss in left eye and vision loss in right eye.   Cardiovascular:  Positive for chest pain. Negative for dyspnea on exertion, irregular heartbeat, leg swelling, near-syncope, palpitations and syncope.   Respiratory:  Negative for shortness of breath and wheezing.    Endocrine: Negative.    Hematologic/Lymphatic: Negative.    Skin: Negative.    Musculoskeletal:  Negative for arthritis, back pain, joint pain and myalgias.   Gastrointestinal:  Negative for abdominal pain, hematemesis, hematochezia and melena.   Genitourinary: Negative.    Neurological:  Negative for light-headedness and loss of balance.   Psychiatric/Behavioral: Negative.     Objective:     Vital Signs (Most Recent):  Temp: 98.9 °F (37.2 °C) (03/22/23 1917)  Pulse: 65 (03/22/23 2302)  Resp: (!) 23 (03/22/23 2302)  BP: (!) 182/66 (03/22/23 2302)  SpO2: 99 % (03/22/23 2302)   Vital Signs (24h Range):  Temp:  [98.9 °F (37.2 °C)] 98.9 °F (37.2 °C)  Pulse:  [64-65] 65  Resp:  [18-31] 23  SpO2:  [99 %-100 %] 99 %  BP: (166-182)/(66-86) 182/66     Weight: 49.9 kg (110 lb)  Body mass index is 20.12 kg/m².    SpO2: 99 %       No intake or output data in the 24 hours ending 03/23/23 0014    Lines/Drains/Airways       Peripheral Intravenous Line  Duration                  Peripheral IV - Single Lumen 03/22/23 2202 22 G Left Hand <1 day         Peripheral  IV - Single Lumen 03/22/23 2352 20 G Anterior;Distal;Right Wrist <1 day                    Physical Exam  Vitals and nursing note reviewed.   Constitutional:       Appearance: Normal appearance. She is normal weight.   HENT:      Head: Atraumatic.      Mouth/Throat:      Mouth: Mucous membranes are moist.   Eyes:      General: No scleral icterus.     Extraocular Movements: Extraocular movements intact.   Neck:      Vascular: No carotid bruit.   Cardiovascular:      Rate and Rhythm: Normal rate and regular rhythm.      Pulses: Normal pulses.      Heart sounds: Normal heart sounds. No murmur heard.    No gallop.   Pulmonary:      Effort: Pulmonary effort is normal. No respiratory distress.      Breath sounds: Normal breath sounds. No wheezing.   Abdominal:      General: Abdomen is flat. Bowel sounds are normal.      Palpations: Abdomen is soft.   Musculoskeletal:         General: Normal range of motion.      Right lower leg: No edema.      Left lower leg: No edema.   Skin:     General: Skin is warm and dry.      Capillary Refill: Capillary refill takes less than 2 seconds.   Neurological:      General: No focal deficit present.      Mental Status: She is alert and oriented to person, place, and time. Mental status is at baseline.       Significant Labs: All pertinent lab results from the last 24 hours have been reviewed.    Significant Imaging: Echocardiogram: Transthoracic echo (TTE) complete (Cupid Only): No results found for this or any previous visit.

## 2023-03-23 NOTE — HPI
89yo F with pmhx of CAD s/p PCI (2004), fibromyalgia, hypothyroidism who presents to the ED with chest pain    Pt is sleeping and does not want to talk so called family.  Per god-daughter (her only care taker) she complained of some chest pain at rest yesterday night.  She attributed this to her fibromyalgia.  She ambulates with a cane and denies any chest pain with activity.  This AM had chest pain again at rest and went to lie down.  Family was concerned and pt was brought to the ED    In the ED pt was AF HTN to 170-180s systolic on RA.  CMP shows a Cr of 1.8 (baseline around 1.2).  Trop was elevated to 8.  BNP 1000 (higher than previous).  CXR negative for pulmonary edema.  EKG shows a v paced rhythm.  I evaluated the pt at the bedside.  Cardiology was consulted for evaluation.  She said she did not have any chest pain but her family thinks she did.  She denies any pain now, orthopnea, LE swelling, or Orozco.  Unclear if given SL NTG.  Would not let me do a bedside TTE because she was tired.  Of note she is DNR and does NOT want any procedures performed (verified with family)

## 2023-03-23 NOTE — HPI
"89 y.o. female with PMHx of fibromyalgia, HTN, CAD s/p stent placement around 2005, HLD, prediabetes, glaucoma, anxiety, hypothyroidism, osteoporosis presenting with acute onset of chest pain that has been going on since last night.  She states she has had pain like this for years and thinks it is secondary to her fibromyalgia. The pain is reproducible when she pushes on her chest.  She denies any shortness of breath, nausea, vomiting, diaphoresis. Pain is located substernally, nonexertional, does not take nitroglycerin at home. Heating pads help. Currently pain free at time of evaluation. She did not wish to come to the emergency department but her family got scared when she was complaining of chest pain and called EMS. Denies tobacco use, but has had some exposure to secondhand smoke from her . Denies orthopnea, PND, leg swelling abdominal swelling, abnormal UOP, excessive salt intake beyond her usual. Reports drinks very little at baseline. Reports medication compliance but stated meds are very different from her personal list and meds listed in chart. Does not take lasix or aldactone (listed on her med list paper in her folder, aldactone 12.5mg and lasix 20mg prn for SBP>150). Denies previous history of heart failure. Home blood pressures are normally in 110-130s systolic. Has been out of her xanax for a month, takes it for sleep and anxiety (takes no SSRIs). At baseline she ambulates well, barely needing her walker. Has remote hx of fall, but her balance has been good lately. She reports following with Dr. Veliz. Reportedly takes plavix 3x/week per his instructions, but expresses confusion regarding this. Unclear why she has statin intolerance listed per chart review. She and her daughter were unable to provide specific information about what statin induced reaction she might've had. Reports taking Zetia sometimes. Also unclear what allergy she had to aspirin (listed as "headache" in chart), but " patient denies being on aspirin or any specific known reaction to it.     In ED, labs were significant for troponin of 8.141, Cr 1.8, BUN 40, Na 134, BNP 1022, , . BP was elevated to 182/66 in ED, on room air, afebrile, HR normal. She wishes to be DNR and does not want invasive intervention such as cardiac cath. Admitted to hospital [facility] for ACS protocol. Has been starting on ticagrelor and heparin drip in ED.

## 2023-03-23 NOTE — ED NOTES
Patient alert and oriented X 4 patient transferred off unit by this nurse in a stretcher with cardiac monitoring without incident. Heparin infusing per Md order. No complaints at this time. Third floor Charge nurse and floor nurse at bedside

## 2023-03-23 NOTE — PLAN OF CARE
Cat Bolivar  is a 89 y.o. female with PMHx of HTN, CAD s/p PCI to LAD + RCA in 2005, and PCI to prox RCA in 2010, bradycardia s/p PPM, fibromyalgia, hypothyroidism, osteoporosis presenting with acute onset of chest pain that started two nights ago and recurred yesterday morning. In the ED pt was AF HTN to 170-180s systolic on RA.  CMP shows a Cr of 1.8 (baseline around 1.2).  Trop was elevated 8 -> 11 -> 14.8. BNP 1000.  CXR negative for pulmonary edema.  EKG shows a ventricular paced rhythm.     TTE 3/23/2023  The left ventricle is normal in size with moderately decreased systolic function.  There are segmental left ventricular wall motion abnormalities.  The estimated ejection fraction is 35%.  There is abnormal septal wall motion.  Indeterminate left ventricular diastolic function.  Normal right ventricular size with normal right ventricular systolic function.  Biatrial enlargement.  Aortic valve area is 1.02 cm2; peak velocity is 1.73 m/s; mean gradient is 6 mmHg. The aortic valve stenosis is probably mild. The continuity equation may not be accurate in the presence of low stroke volume.  There is mild aortic valve stenosis.  Mild aortic regurgitation.  Mild mitral regurgitation.  Moderate tricuspid regurgitation.  The estimated PA systolic pressure is 38 mmHg.  Elevated central venous pressure (15 mmHg).    Patient's HF with reduced EF with segmental LV WMA, likely secondary to NSTEMI and possible ischemic changes following event. While pt does not look overtly hypervolemic on exam, in the setting of imaging suggesting elevated CVP of 15 and reduced EF 35%, would recommend considering initiation of GDMT. At time of evaluation later in the day, pt no longer complaining of any chest pain. Pt and her HCPOA Lyssa, confirm that they do not want to undergo any invasive procedures, including a left heart catheterization. They wish to proceed with medical management only at this time.     Recommendations:  - Medical  management of NSTEMI per ACS protocol:  - Continue Heparin gtt for 48 hours  - Continue ASA 81mg  - Continue Plavix 75mg, given pt's advanced age and bleeding risk given previous history of PUD and melena.  - Recommend Coreg 6.25mg BID with goal HR 60 and for better BP control   - Continue Amlodipine 10mg for BP and angina  - Lisinopril was held in setting of OFELIA, recommend discontinuing it and in 36 hours, if renal function permits, starting Entresto 49-51mg BID. Given pt's hypertension, believe middle dose appropriate and will be tolerated.  - Consider SGLT2i on discharge.  - Recommend holding Zetia in setting of transaminitis  - Would clarify pt's history of statin intolerance and consider resumption of statin      Cardiology will sign-off at this time. Please call with any additional questions, concerns or changes in the patient's clinical status. Thank you for involving us in the care of this patient. Patient discussed with Dr. Pedraza, staff attestation to follow.      Cipriano Roberson MD   Internal Medicine PGY-1  Cardiology

## 2023-03-24 PROBLEM — R79.89 ELEVATED BRAIN NATRIURETIC PEPTIDE (BNP) LEVEL: Status: RESOLVED | Noted: 2023-03-23 | Resolved: 2023-03-24

## 2023-03-24 PROBLEM — I50.20 HFREF (HEART FAILURE WITH REDUCED EJECTION FRACTION): Status: ACTIVE | Noted: 2023-03-24

## 2023-03-24 LAB
ALBUMIN SERPL BCP-MCNC: 2.8 G/DL (ref 3.5–5.2)
ALP SERPL-CCNC: 70 U/L (ref 55–135)
ALT SERPL W/O P-5'-P-CCNC: 91 U/L (ref 10–44)
ANION GAP SERPL CALC-SCNC: 8 MMOL/L (ref 8–16)
APTT BLDCRRT: 59.9 SEC (ref 21–32)
AST SERPL-CCNC: 94 U/L (ref 10–40)
BASOPHILS # BLD AUTO: 0.02 K/UL (ref 0–0.2)
BASOPHILS NFR BLD: 0.3 % (ref 0–1.9)
BILIRUB SERPL-MCNC: 0.4 MG/DL (ref 0.1–1)
BUN SERPL-MCNC: 35 MG/DL (ref 10–30)
CALCIUM SERPL-MCNC: 8.6 MG/DL (ref 8.7–10.5)
CHLORIDE SERPL-SCNC: 99 MMOL/L (ref 95–110)
CO2 SERPL-SCNC: 25 MMOL/L (ref 23–29)
CREAT SERPL-MCNC: 1.6 MG/DL (ref 0.5–1.4)
DIFFERENTIAL METHOD: ABNORMAL
EOSINOPHIL # BLD AUTO: 0 K/UL (ref 0–0.5)
EOSINOPHIL NFR BLD: 0.3 % (ref 0–8)
ERYTHROCYTE [DISTWIDTH] IN BLOOD BY AUTOMATED COUNT: 14 % (ref 11.5–14.5)
EST. GFR  (NO RACE VARIABLE): 28.8 ML/MIN/1.73 M^2
GLUCOSE SERPL-MCNC: 116 MG/DL (ref 70–110)
HCT VFR BLD AUTO: 30.8 % (ref 37–48.5)
HGB BLD-MCNC: 10.1 G/DL (ref 12–16)
IMM GRANULOCYTES # BLD AUTO: 0.03 K/UL (ref 0–0.04)
IMM GRANULOCYTES NFR BLD AUTO: 0.5 % (ref 0–0.5)
LYMPHOCYTES # BLD AUTO: 1.2 K/UL (ref 1–4.8)
LYMPHOCYTES NFR BLD: 20.3 % (ref 18–48)
MAGNESIUM SERPL-MCNC: 1.9 MG/DL (ref 1.6–2.6)
MCH RBC QN AUTO: 32.4 PG (ref 27–31)
MCHC RBC AUTO-ENTMCNC: 32.8 G/DL (ref 32–36)
MCV RBC AUTO: 99 FL (ref 82–98)
MONOCYTES # BLD AUTO: 0.4 K/UL (ref 0.3–1)
MONOCYTES NFR BLD: 7.3 % (ref 4–15)
NEUTROPHILS # BLD AUTO: 4.1 K/UL (ref 1.8–7.7)
NEUTROPHILS NFR BLD: 71.3 % (ref 38–73)
NRBC BLD-RTO: 0 /100 WBC
PHOSPHATE SERPL-MCNC: 3.1 MG/DL (ref 2.7–4.5)
PLATELET # BLD AUTO: 207 K/UL (ref 150–450)
PMV BLD AUTO: 11.5 FL (ref 9.2–12.9)
POTASSIUM SERPL-SCNC: 3.6 MMOL/L (ref 3.5–5.1)
PROT SERPL-MCNC: 7.5 G/DL (ref 6–8.4)
RBC # BLD AUTO: 3.12 M/UL (ref 4–5.4)
SODIUM SERPL-SCNC: 132 MMOL/L (ref 136–145)
WBC # BLD AUTO: 5.76 K/UL (ref 3.9–12.7)

## 2023-03-24 PROCEDURE — 25000003 PHARM REV CODE 250: Mod: HCNC | Performed by: EMERGENCY MEDICINE

## 2023-03-24 PROCEDURE — 25000003 PHARM REV CODE 250: Mod: HCNC | Performed by: STUDENT IN AN ORGANIZED HEALTH CARE EDUCATION/TRAINING PROGRAM

## 2023-03-24 PROCEDURE — 85730 THROMBOPLASTIN TIME PARTIAL: CPT | Mod: HCNC | Performed by: EMERGENCY MEDICINE

## 2023-03-24 PROCEDURE — 85025 COMPLETE CBC W/AUTO DIFF WBC: CPT | Mod: HCNC | Performed by: EMERGENCY MEDICINE

## 2023-03-24 PROCEDURE — 80053 COMPREHEN METABOLIC PANEL: CPT | Mod: HCNC

## 2023-03-24 PROCEDURE — 84100 ASSAY OF PHOSPHORUS: CPT | Mod: HCNC

## 2023-03-24 PROCEDURE — 25000003 PHARM REV CODE 250: Mod: HCNC

## 2023-03-24 PROCEDURE — 97530 THERAPEUTIC ACTIVITIES: CPT | Mod: HCNC

## 2023-03-24 PROCEDURE — 83735 ASSAY OF MAGNESIUM: CPT | Mod: HCNC

## 2023-03-24 PROCEDURE — 63600175 PHARM REV CODE 636 W HCPCS: Mod: HCNC | Performed by: EMERGENCY MEDICINE

## 2023-03-24 PROCEDURE — 97161 PT EVAL LOW COMPLEX 20 MIN: CPT | Mod: HCNC

## 2023-03-24 PROCEDURE — 97165 OT EVAL LOW COMPLEX 30 MIN: CPT | Mod: HCNC

## 2023-03-24 PROCEDURE — 36415 COLL VENOUS BLD VENIPUNCTURE: CPT | Mod: HCNC | Performed by: EMERGENCY MEDICINE

## 2023-03-24 PROCEDURE — 99233 SBSQ HOSP IP/OBS HIGH 50: CPT | Mod: HCNC,GC,, | Performed by: STUDENT IN AN ORGANIZED HEALTH CARE EDUCATION/TRAINING PROGRAM

## 2023-03-24 PROCEDURE — 97535 SELF CARE MNGMENT TRAINING: CPT | Mod: HCNC

## 2023-03-24 PROCEDURE — 63600175 PHARM REV CODE 636 W HCPCS: Mod: HCNC | Performed by: STUDENT IN AN ORGANIZED HEALTH CARE EDUCATION/TRAINING PROGRAM

## 2023-03-24 PROCEDURE — 99233 PR SUBSEQUENT HOSPITAL CARE,LEVL III: ICD-10-PCS | Mod: HCNC,GC,, | Performed by: STUDENT IN AN ORGANIZED HEALTH CARE EDUCATION/TRAINING PROGRAM

## 2023-03-24 PROCEDURE — 20600001 HC STEP DOWN PRIVATE ROOM: Mod: HCNC

## 2023-03-24 RX ORDER — FUROSEMIDE 10 MG/ML
60 INJECTION INTRAMUSCULAR; INTRAVENOUS ONCE
Status: COMPLETED | OUTPATIENT
Start: 2023-03-24 | End: 2023-03-24

## 2023-03-24 RX ADMIN — HEPARIN SODIUM 12 UNITS/KG/HR: 10000 INJECTION, SOLUTION INTRAVENOUS at 01:03

## 2023-03-24 RX ADMIN — LIDOCAINE 1 PATCH: 50 PATCH CUTANEOUS at 09:03

## 2023-03-24 RX ADMIN — GABAPENTIN 100 MG: 100 CAPSULE ORAL at 09:03

## 2023-03-24 RX ADMIN — LATANOPROST 1 DROP: 50 SOLUTION OPHTHALMIC at 08:03

## 2023-03-24 RX ADMIN — DORZOLAMIDE HYDROCHLORIDE 1 DROP: 20 SOLUTION/ DROPS OPHTHALMIC at 08:03

## 2023-03-24 RX ADMIN — BRIMONIDINE TARTRATE 1 DROP: 1.5 SOLUTION/ DROPS OPHTHALMIC at 08:03

## 2023-03-24 RX ADMIN — CARVEDILOL 6.25 MG: 6.25 TABLET, FILM COATED ORAL at 08:03

## 2023-03-24 RX ADMIN — CARVEDILOL 6.25 MG: 6.25 TABLET, FILM COATED ORAL at 09:03

## 2023-03-24 RX ADMIN — PANTOPRAZOLE SODIUM 40 MG: 40 TABLET, DELAYED RELEASE ORAL at 09:03

## 2023-03-24 RX ADMIN — DORZOLAMIDE HYDROCHLORIDE 1 DROP: 20 SOLUTION/ DROPS OPHTHALMIC at 09:03

## 2023-03-24 RX ADMIN — CLOPIDOGREL BISULFATE 75 MG: 75 TABLET ORAL at 09:03

## 2023-03-24 RX ADMIN — Medication 6 MG: at 08:03

## 2023-03-24 RX ADMIN — BRIMONIDINE TARTRATE 1 DROP: 1.5 SOLUTION/ DROPS OPHTHALMIC at 09:03

## 2023-03-24 RX ADMIN — FUROSEMIDE 60 MG: 10 INJECTION, SOLUTION INTRAMUSCULAR; INTRAVENOUS at 09:03

## 2023-03-24 RX ADMIN — LEVOTHYROXINE SODIUM 50 MCG: 50 TABLET ORAL at 05:03

## 2023-03-24 RX ADMIN — ASPIRIN 81 MG 81 MG: 81 TABLET ORAL at 09:03

## 2023-03-24 RX ADMIN — GABAPENTIN 100 MG: 100 CAPSULE ORAL at 08:03

## 2023-03-24 RX ADMIN — AMLODIPINE BESYLATE 10 MG: 10 TABLET ORAL at 09:03

## 2023-03-24 NOTE — PLAN OF CARE
Plan of Care:  PT evaluation completed- see note for details. Goals and POC established.     Please continue Progressive Mobility Protocol as appropriate.  Pt to be seen 4x/week during acute hospitalization to address listed goals below.     3/24/2023    Physical Therapy Treatment Goals:  Multidisciplinary Problems       Physical Therapy Goals          Problem: Physical Therapy    Goal Priority Disciplines Outcome Goal Variances Interventions   Physical Therapy Goal     PT, PT/OT Ongoing, Progressing     Description: Goals to be met by: 23     Patient will increase functional independence with mobility by performin. Supine <> sit with Modified [place]  2. Sit to stand transfer with Modified [place]  3. Gait  x 100 feet with Supervision using LRAD  4. Stand for 5 minutes with Supervision using LRAD with no LOB   5. Lower extremity exercise program x20 reps per handout, with supervision

## 2023-03-24 NOTE — SUBJECTIVE & OBJECTIVE
Interval History: Patient doing well on ACS protocol and without chest pain. She has been up walking around with walk assist. Still diuresing with spot IV lasix.    Review of Systems   Constitutional:  Negative for appetite change and fever.   HENT:  Negative for congestion and sinus pain.    Eyes:  Negative for discharge and redness.   Respiratory:  Negative for cough and shortness of breath.    Cardiovascular:  Negative for chest pain and leg swelling.   Gastrointestinal:  Negative for diarrhea and nausea.   Endocrine: Negative for polydipsia and polyuria.   Genitourinary:  Negative for dysuria and hematuria.   Musculoskeletal:  Negative for arthralgias and myalgias.   Skin:  Negative for color change and rash.   Neurological:  Negative for weakness and numbness.   Psychiatric/Behavioral:  Negative for agitation and confusion.    Objective:     Vital Signs (Most Recent):  Temp: 97.9 °F (36.6 °C) (03/24/23 1133)  Pulse: 65 (03/24/23 1133)  Resp: 16 (03/24/23 1133)  BP: (!) 106/58 (03/24/23 1133)  SpO2: 97 % (03/24/23 1133)   Vital Signs (24h Range):  Temp:  [97.6 °F (36.4 °C)-98.4 °F (36.9 °C)] 97.9 °F (36.6 °C)  Pulse:  [65-68] 65  Resp:  [16-19] 16  SpO2:  [95 %-97 %] 97 %  BP: (106-162)/(58-67) 106/58     Weight: 45.6 kg (100 lb 8.5 oz)  Body mass index is 18.39 kg/m².    Intake/Output Summary (Last 24 hours) at 3/24/2023 1354  Last data filed at 3/24/2023 0957  Gross per 24 hour   Intake 980 ml   Output 2650 ml   Net -1670 ml      Physical Exam  Constitutional:       General: She is not in acute distress.     Appearance: Normal appearance.   HENT:      Head: Normocephalic and atraumatic.      Mouth/Throat:      Mouth: Mucous membranes are moist.      Pharynx: Oropharynx is clear.   Eyes:      Conjunctiva/sclera: Conjunctivae normal.      Pupils: Pupils are equal, round, and reactive to light.   Cardiovascular:      Rate and Rhythm: Normal rate and regular rhythm.      Pulses: Normal pulses.      Heart sounds:  Normal heart sounds.      Comments: JVP to low to mid neck at around 60 degrees  Pulmonary:      Effort: Pulmonary effort is normal.      Breath sounds: Rales (bibasilar) present.   Abdominal:      General: Abdomen is flat.      Palpations: Abdomen is soft.   Musculoskeletal:         General: No swelling or deformity.      Cervical back: Normal range of motion and neck supple. No tenderness.   Skin:     General: Skin is warm and dry.   Neurological:      General: No focal deficit present.      Mental Status: She is alert and oriented to person, place, and time.   Psychiatric:         Mood and Affect: Mood normal.         Behavior: Behavior normal.       Significant Labs: All pertinent labs within the past 24 hours have been reviewed.    Significant Imaging: I have reviewed all pertinent imaging results/findings within the past 24 hours.

## 2023-03-24 NOTE — PROGRESS NOTES
Elie Jung - Cardiology [facility] Medicine  Progress Note    Patient Name: Cat Bolivar  MRN: [id number]  Patient Class: IP- Inpatient   Admission Date: 3/22/2023  Length of Stay: 2 days  Attending Physician: Boone Waller MD  Primary Care Provider: Primary Doctor No        Subjective:     Principal Problem:NSTEMI (non-ST elevated myocardial infarction)        HPI:  89 y.o. female with PMHx of fibromyalgia, HTN, CAD s/p stent placement around 2005, HLD, prediabetes, glaucoma, anxiety, hypothyroidism, osteoporosis presenting with acute onset of chest pain that has been going on since last night.  She states she has had pain like this for years and thinks it is secondary to her fibromyalgia. The pain is reproducible when she pushes on her chest.  She denies any shortness of breath, nausea, vomiting, diaphoresis. Pain is located substernally, nonexertional, does not take nitroglycerin at home. Heating pads help. Currently pain free at time of evaluation. She did not wish to come to the emergency department but her family got scared when she was complaining of chest pain and called EMS. Denies tobacco use, but has had some exposure to secondhand smoke from her . Denies orthopnea, PND, leg swelling abdominal swelling, abnormal UOP, excessive salt intake beyond her usual. Reports drinks very little at baseline. Reports medication compliance but stated meds are very different from her personal list and meds listed in chart. Does not take lasix or aldactone (listed on her med list paper in her folder, aldactone 12.5mg and lasix 20mg prn for SBP>150). Denies previous history of heart failure. Home blood pressures are normally in 110-130s systolic. Has been out of her xanax for a month, takes it for sleep and anxiety (takes no SSRIs). At baseline she ambulates well, barely needing her walker. Has remote hx of fall, but her balance has been good lately. She reports following with Dr. Veliz. Reportedly  "takes plavix 3x/week per his instructions, but expresses confusion regarding this. Unclear why she has statin intolerance listed per chart review. She and her daughter were unable to provide specific information about what statin induced reaction she might've had. Reports taking Zetia sometimes. Also unclear what allergy she had to aspirin (listed as "headache" in chart), but patient denies being on aspirin or any specific known reaction to it.     In ED, labs were significant for troponin of 8.141, Cr 1.8, BUN 40, Na 134, BNP 1022, , . BP was elevated to 182/66 in ED, on room air, afebrile, HR normal. She wishes to be DNR and does not want invasive intervention such as cardiac cath. Admitted to [facility] for ACS protocol. Has been starting on ticagrelor and heparin drip in ED.       Overview/Hospital Course:  Patient admitted to hospital medicine for NSTEMI and treated with medical management based on goals of care conversations. She is also being managed for volume overload with IV lasix. She has continued to be chest pain free. PT/OT evaluating for needs. She seems to have progressive memory decline (chronic) and will evaluate her best discharge situation with continued GOC conversations.      Interval History: Patient doing well on ACS protocol and without chest pain. She has been up walking around with walk assist. Still diuresing with spot IV lasix.    Review of Systems   Constitutional:  Negative for appetite change and fever.   HENT:  Negative for congestion and sinus pain.    Eyes:  Negative for discharge and redness.   Respiratory:  Negative for cough and shortness of breath.    Cardiovascular:  Negative for chest pain and leg swelling.   Gastrointestinal:  Negative for diarrhea and nausea.   Endocrine: Negative for polydipsia and polyuria.   Genitourinary:  Negative for dysuria and hematuria.   Musculoskeletal:  Negative for arthralgias and myalgias.   Skin:  Negative for color change and " rash.   Neurological:  Negative for weakness and numbness.   Psychiatric/Behavioral:  Negative for agitation and confusion.    Objective:     Vital Signs (Most Recent):  Temp: 97.9 °F (36.6 °C) (03/24/23 1133)  Pulse: 65 (03/24/23 1133)  Resp: 16 (03/24/23 1133)  BP: (!) 106/58 (03/24/23 1133)  SpO2: 97 % (03/24/23 1133)   Vital Signs (24h Range):  Temp:  [97.6 °F (36.4 °C)-98.4 °F (36.9 °C)] 97.9 °F (36.6 °C)  Pulse:  [65-68] 65  Resp:  [16-19] 16  SpO2:  [95 %-97 %] 97 %  BP: (106-162)/(58-67) 106/58     Weight: 45.6 kg (100 lb 8.5 oz)  Body mass index is 18.39 kg/m².    Intake/Output Summary (Last 24 hours) at 3/24/2023 1354  Last data filed at 3/24/2023 0957  Gross per 24 hour   Intake 980 ml   Output 2650 ml   Net -1670 ml      Physical Exam  Constitutional:       General: She is not in acute distress.     Appearance: Normal appearance.   HENT:      Head: Normocephalic and atraumatic.      Mouth/Throat:      Mouth: Mucous membranes are moist.      Pharynx: Oropharynx is clear.   Eyes:      Conjunctiva/sclera: Conjunctivae normal.      Pupils: Pupils are equal, round, and reactive to light.   Cardiovascular:      Rate and Rhythm: Normal rate and regular rhythm.      Pulses: Normal pulses.      Heart sounds: Normal heart sounds.      Comments: JVP to low to mid neck at around 60 degrees  Pulmonary:      Effort: Pulmonary effort is normal.      Breath sounds: Rales (bibasilar) present.   Abdominal:      General: Abdomen is flat.      Palpations: Abdomen is soft.   Musculoskeletal:         General: No swelling or deformity.      Cervical back: Normal range of motion and neck supple. No tenderness.   Skin:     General: Skin is warm and dry.   Neurological:      General: No focal deficit present.      Mental Status: She is alert and oriented to person, place, and time.   Psychiatric:         Mood and Affect: Mood normal.         Behavior: Behavior normal.       Significant Labs: All pertinent labs within the past 24  hours have been reviewed.    Significant Imaging: I have reviewed all pertinent imaging results/findings within the past 24 hours.      Assessment/Plan:      * NSTEMI (non-ST elevated myocardial infarction)  Presented with chest pain that is reproducible with palpation.   EKG showed: paced rhythm, no ischemic changes  Trop up to 21, stopped trend. BNP 1022  MAR 3, Khush[address] 132  Renal fxn: OFELIA  GOC: pursue medical management    Plan:  - ACS protocol with ASA, Plavix, Heparin 48 hours. DNR, no cath. Medical mgmt  - TTE with reduced EF 35%  - Statin: intolerance. Held Zetia due to liver dysfunction  - B-blocker: coreg  - ACEi/ARB: held lisinopril due to ofelia  - NTG/repeat EKG PRN for chest pain    OFELIA (acute kidney injury)  Creatinine 1.8 on admit, baseline around 1.2  - Pre-renal with low PO intake versus cardiorenal syndrome in the setting of new ischemic cardiomyopathy/volume overload  Lab Results   Component Value Date    CREATININE 1.6 (H) 03/24/2023       Plan:   - Strict I&Os and daily weights   - Daily BMP  - Trend sCr  - Avoid nephrotoxic agents such as NSAIDs, gadolinium, ACEi, ARBs and IV radiocontrast.  - Renally dose meds to current GFR.  - Maintain MAP > 65.  - No indications for HD at this time.   - Maintain electrolytes at goal: Mg > 2, Phos > 3, K > 4.    HFrEF (heart failure with reduced ejection fraction)  EF 35% in the setting of CAD, NSTEMI, likely secondary to ischemic cardiomyopathy.  Cardiology consulted. Medical mgmt of NSTEMI as above.   Trending OFELIA, likely CRS. Hepatic function labs improving, likely congestive hepatopathy.  - Coreg 6.25mg BID, goal HR 60  - was on ACE. Held due to OFELIA. Will start entresto when OFELIA resolves. Needs further HTN control  - IV diuresis for now. Volume up on exam. Trending renal fxn. Depending on living situation on discharge will either write prn lasix or maybe 2-3 times weekly  - Outpatient cards f/u on d/c    Transaminitis  RUQ US with doppler  negative  Clinically without evidence of inflammatory conditions involving the gallbladder or liver  Improving with diuresis, likely congestive      Primary hypertension  Takes lisinopril at home. Held due to OFELIA. Home Bps normal. Hypertensive in ED.     - started on amlodipine and coreg  - uptitration of coreg and anti-HTNs in setting of NSTEMI, HFrEF. Addition of entresto when able    Gastrointestinal hemorrhage with melena  Hx of gastric ulcer possibly 2/2 to NSAID use. Denies current GIB      Statin intolerance  Held off initiating statin as part of ACS protocol    - close to goal, held zetia in setting of transaminase elevation    Glaucoma  Resume home drops      Presence of stent in coronary artery  S/p stent around 2005      Anxiety  Denies taking sertraline and escitalopram listed on her home med printout sheet. Only takes xanax 0.25 prn at night to sleep. Has been out of that prescription for a month.     - low risk of benzo withdrawal  - xanax 0.25 prn      Pacemaker  noted      Hypothyroidism  Continue home synthroid        VTE Risk Mitigation (From admission, onward)         Ordered     heparin 25,000 units in dextrose 5% (100 units/ml) IV bolus from bag - ADDITIONAL PRN BOLUS - 60 units/kg (max bolus 4000 units)  As needed (PRN)        Question:  Heparin Infusion Adjustment (DO NOT MODIFY ANSWER)  Answer:  \\[email or web address]\Images\Pharmacy\HeparinInfusions\heparin LOW INTENSITY nomogram for [facility] [id number]    03/22/23 2109     heparin 25,000 units in dextrose 5% (100 units/ml) IV bolus from bag - ADDITIONAL PRN BOLUS - 30 units/kg (max bolus 4000 units)  As needed (PRN)        Question:  Heparin Infusion Adjustment (DO NOT MODIFY ANSWER)  Answer:  \\[email or web address]\Images\Pharmacy\HeparinInfusions\heparin LOW INTENSITY nomogram for [facility] [id number]    03/22/23 2109     heparin 25,000 units in dextrose 5% 250 mL (100 units/mL) infusion LOW INTENSITY nomogram - [facility]  Continuous        Question Answer  Comment   Heparin Infusion Adjustment (DO NOT MODIFY ANSWER) [email or web address]\Images\Pharmacy\HeparinInfusions\heparin LOW INTENSITY nomogram for [facility] [id number]    Begin at (in units/kg/hr) 12        03/22/23 2109     IP VTE HIGH RISK PATIENT  Once         03/22/23 2259     Place sequential compression device  Until discontinued         03/22/23 2259     Place sequential compression device  Until discontinued         03/22/23 2140     Reason for no Mechanical VTE Prophylaxis  Once        Question:  Reasons:  Answer:  Physician Provided (leave comment)  Comment:  on heparin drip    03/22/23 2140                Discharge Planning   WADE: 3/25/2023     Code Status: DNR   Is the patient medically ready for discharge?:     Reason for patient still in hospital (select all that apply): Patient trending condition, Treatment and PT / OT recommendations  Discharge Plan A: Home with family, Home Health                  Connor M Gillies, MD  Department of Hospital Medicine   [facility] - Cardiology Stepdown

## 2023-03-24 NOTE — ASSESSMENT & PLAN NOTE
EF 35% in the setting of CAD, NSTEMI, likely secondary to ischemic cardiomyopathy.  Cardiology consulted. Medical mgmt of NSTEMI as above.   Trending OFELIA, likely CRS. Hepatic function labs improving, likely congestive hepatopathy.  - Coreg 6.25mg BID, goal HR 60  - was on ACE. Held due to OFELIA. Will start entresto when OFELIA resolves. Needs further HTN control  - IV diuresis for now. Volume up on exam. Trending renal fxn. Depending on living situation on discharge will either write prn lasix or maybe 2-3 times weekly  - Outpatient cards f/u on d/c

## 2023-03-24 NOTE — PLAN OF CARE
Problem: Occupational Therapy  Goal: Occupational Therapy Goal  Description: Goals to be met by: 4/7/23     Patient will increase functional independence with ADLs by performing:    UE Dressing with Supervision.  LE Dressing with Supervision.  Grooming while standing at sink with Supervision.  Toileting from toilet with Supervision for hygiene and clothing management.   Supine to sit with Supervision.  Step transfer with Supervision  Upper extremity exercise program with supervision.    Outcome: Ongoing, Progressing    OT eval completed. The above goals are established to improve functional (I) and mobility.

## 2023-03-24 NOTE — ASSESSMENT & PLAN NOTE
Presented with chest pain that is reproducible with palpation.   EKG showed: paced rhythm, no ischemic changes  Trop up to 21, stopped trend. BNP 1022  MAR 3, Khushbu 132  Renal fxn: OFELIA  GOC: pursue medical management    Plan:  - ACS protocol with ASA, Plavix, Heparin 48 hours. DNR, no cath. Medical mgmt  - TTE with reduced EF 35%  - Statin: intolerance. Held Zetia due to liver dysfunction  - B-blocker: coreg  - ACEi/ARB: held lisinopril due to ofelia  - NTG/repeat EKG PRN for chest pain

## 2023-03-24 NOTE — PT/OT/SLP EVAL
Occupational Therapy   Evaluation    Name: Cat Bolivar  MRN: [id number]  Admitting Diagnosis: NSTEMI (non-ST elevated myocardial infarction)  Recent Surgery: * No surgery found *      Recommendations:     Discharge Recommendations: Other  Discharge Equipment Recommendations:  none  Barriers to discharge:  Decreased caregiver support    Assessment:     Cta Bolivar is a 89 y.o. female with a medical diagnosis of NSTEMI (non-ST elevated myocardial infarction).  She presents with performance deficits affecting function: weakness, impaired endurance, impaired self care skills, impaired functional mobility, gait instability, impaired balance, decreased safety awareness, impaired cardiopulmonary response to activity.  Pt participates well and is motivated to regain functional independence in mobility and self care.      Rehab Prognosis: Good; patient would benefit from acute skilled OT services to address these deficits and reach maximum level of function.       Plan:     Patient to be seen 3 x/week to address the above listed problems via self-care/home management, therapeutic activities, therapeutic exercises  Plan of Care Expires: 04/24/23  Plan of Care Reviewed with: patient    Subjective     Chief Complaint: Being in bed  Patient/Family Comments/goals: Get well and return home    Occupational Profile:  Living Environment: Pt lives alone in a house with no CHARLEEN and has a tub/shower combo with TTB.  Previous level of function: Independent, likes watching TV and walking  Roles and Routines: Watches TV, spends time with friends/family  Equipment Used at Home: bath bench, walker, rolling, grab bar, cane, straight  Assistance upon Discharge: Niece, friends    Pain/Comfort:  Pain Rating 1: 0/10  Location - Orientation 1: generalized  Location 1: chest  Pain Addressed 1: Pre-medicate for activity  Pain Rating Post-Intervention 1: 0/10    Patients cultural, spiritual, Confucianist conflicts given the current situation:  no    Objective:     Communicated with: RN prior to session.  Patient found HOB elevated with peripheral IV, telemetry upon OT entry to room.    General Precautions: Standard, fall  Orthopedic Precautions: N/A  Braces: N/A  Respiratory Status: Room air    Occupational Performance:    Bed Mobility:    Patient completed Supine to Sit with stand by assistance    Functional Mobility/Transfers:  Patient completed Sit <> Stand Transfer with contact guard assistance  with  no assistive device   Patient completed Bed <> Chair Transfer using Step Transfer technique with contact guard assistance with no assistive device  Patient completed Toilet Transfer Step Transfer technique with minimum assistance with  no AD  Functional Mobility: CGA with no AD    Activities of Daily Living:  Grooming: stand by assistance standing at sink, oral care  Upper Body Dressing: stand by assistance gown as robe  Lower Body Dressing: stand by assistance slippers  Toileting: stand by assistance at toilet    Cognitive/Visual Perceptual:  Cognitive/Psychosocial Skills:     -       Oriented to: Person, Place, Time, and Situation   -       Follows Commands/attention:Follows multistep  commands  -       Safety awareness/insight to disability: grossly intact    Physical Exam:  Balance:    -       Good sitting; Fair+ dynamic standing  Dominant hand:    -       R  Upper Extremity Range of Motion:     -       Right Upper Extremity: WFL  -       Left Upper Extremity: WFL  Upper Extremity Strength:    -       Right Upper Extremity: WFL  -       Left Upper Extremity: WFL   Strength:    -       Right Upper Extremity: WFL  -       Left Upper Extremity: WFL  Fine Motor Coordination:    -       Intact  Gross motor coordination:   WFL    AMPAC 6 Click ADL:  AMPAC Total Score: 20    Treatment & Education:  Pt edu re OT role, POC, safety.  Pt performed bed mobility with SBA, transfers and ambulation with CGA, except toilet t/f requiring Min A due to low height  and no grab bars.  Pt performed ADL tasks with SBA.    Patient left up in chair with all lines intact, call button in reach, and RN notified    GOALS:   Multidisciplinary Problems       Occupational Therapy Goals          Problem: Occupational Therapy    Goal Priority Disciplines Outcome Interventions   Occupational Therapy Goal     OT, PT/OT Ongoing, Progressing    Description: Goals to be met by: 4/7/23     Patient will increase functional independence with ADLs by performing:    UE Dressing with Supervision.  LE Dressing with Supervision.  Grooming while standing at sink with Supervision.  Toileting from toilet with Supervision for hygiene and clothing management.   Supine to sit with Supervision.  Step transfer with Supervision  Upper extremity exercise program with supervision.                         History:     Past Medical History:   Diagnosis Date    Carotid artery disease     Cervical spondylosis without myelopathy 4/28/2014    Fibromyalgia     Fracture of wrist     Fracture, ankle     both at diff times    Glaucoma     Hyperparathyroidism     Hypothyroidism     Osteoporosis, post-menopausal     Unspecified vitamin D deficiency 10/30/2012         Past Surgical History:   Procedure Laterality Date    APPENDECTOMY      Bilateral Levator Repair  7/03    [facility] (Sangeeta Still)    CARDIAC PACEMAKER PLACEMENT      carotid endarterectomy      CATARACT EXTRACTION W/  INTRAOCULAR LENS IMPLANT Bilateral 1990's        CATARACT EXTRACTION W/ INTRAOCULAR LENS IMPLANTW/ TRABECULECTOMY Right 03/1994    [facility] ( Dr. Ott)    CORONARY ANGIOPLASTY WITH STENT PLACEMENT      ESOPHAGOGASTRODUODENOSCOPY N/A 5/31/2021    Procedure: EGD (ESOPHAGOGASTRODUODENOSCOPY);  Surgeon: Brooks Sanchez MD;  Location: [facility] ([address] [place] [address]);  Service: Endoscopy;  Laterality: N/A;    HYSTERECTOMY      Ptosis Revision Left 10/2003    [facility] (Dr. Ott)    RETINAL DETACHMENT SURGERY  2/02    [facility] (Dr. Mckeon)    thyroid nodule      WRIST SURGERY          Time Tracking:     OT Date of Treatment: 03/24/23  OT Start Time: 1126  OT Stop Time: 1150  OT Total Time (min): 24 min    Billable Minutes:Evaluation 14 minutes  Self Care/Home Management 10 minutes    MAHNAZ Navarro  3/24/2023  Pager: [phone]

## 2023-03-24 NOTE — PT/OT/SLP EVAL
Physical Therapy Evaluation and Treatment     Patient Name:  Cat Bolivar  MRN: [id number]    Admit Date: 3/22/2023  Admitting Diagnosis:  NSTEMI (non-ST elevated myocardial infarction)  Length of Stay: 2 days  Recent Surgery: * No surgery found *      Recommendations:     Discharge Recommendations: other (see comments)   Equipment recommendations: rolling walker  Barriers to discharge: Decreased caregiver support available     Assessment:     Cat Bolivar is a 89 y.o. female admitted to [facility] on 3/22/2023 with medical diagnosis of NSTEMI (non-ST elevated myocardial infarction). Pt presents with weakness, impaired endurance, impaired functional mobility, gait instability, impaired balance, impaired cardiopulmonary response to activity. These deficits effect their roles and responsibilities in which they were able to complete prior to admit. Cat Bolivar would benefit from acute PT intervention to improve quality of life, focus on recovery of impairments, provide patient/caregiver education, reduce fall risk, and maximize (I) and safety with functional mobility. Due to advancing age and medical diagnoses, pt would benefit from increased supervision within home setting- will defer to case management for assistance with social support.     Rehab Prognosis: Good    Plan:     During this hospitalization, patient to be seen 4 x/week to address the identified rehab impairments via gait training, therapeutic activities, therapeutic exercises, neuromuscular re-education and progress towards stated goals.     Plan of Care Expires:  04/23/23  Plan of Care reviewed with: patient    This plan of care has been discussed with the patient/caregiver, who was included in its development and is in agreement with the identified goals and treatment plan.     Subjective     Communicated with RN prior to session.  Patient found up in chair upon PT entry to room, agreeable to evaluation.     Chief Complaint: Chest Pain     Patient/Family  "Comments/goals: "I think all the medicines I took have made me sleepy"     Pain/Comfort:  Pain Rating 1: 0/10  Pain Rating Post-Intervention 1: 0/10    Patients cultural, spiritual, Sikh conflicts given the current situation: None identified     Patient History: information obtained from pt      Living Environment: Pt lives alone in single level home  with no CHARLEEN. Bathroom set-up: tub/shower combo with raised toilet height   Prior Level of Function: modified (I) for mobility and ADLs using SPC as AD as needed   DME owned: single point cane and quad cane  Support Available/Caregiver Assistance:  pt has a sitter/aide come 4 hours per day from 4-8 to assist pt as needed. Pt stated she also has her family     Objective:     Patient found with: peripheral IV, telemetry    Recent Surgery: * No surgery found *    General Precautions: Standard, fall   Orthopedic Precautions:N/A   Braces: N/A   Oxygen Device: room air    Exams:    Cognition:  Alert and Cooperative  Command following: Follows multistep verbal commands  Communication: clear/fluent      Sensation:   Light touch sensation: Intact BLEs    Edema/Skin Integrity: None noted; Visible skin intact    Postural examination/scapula alignment: Rounded shoulder and Head forward    Lower Extremity Range of Motion:  Right Lower Extremity: WFL  Left Lower Extremity: WFL    Lower Extremity Strength:  Right Lower Extremity:  5/5 except knee extension 4/5   Left Lower Extremity: 5/5 except knee extension 4/5     Functional Mobility:    Bed Mobility:  Sit > Supine with stand by assistance  Scooting: towards HOB in supine with minimal assistance     Transfers:   Sit <> Stand Transfer: Contact Guard Assistance x 1 trials from bedside chair with no AD                Gait:  Distance: ~18 ft.   Assistance level: Contact Guard Assistance  Assistive Device: none  Gait Assessment: reciprocal gait pattern with decreased step length  and decreased gait speed. No LOB. Pt limited by " mild dizziness and fatigue.     Balance:  Standing:  Static: FAIR: Maintains without assist but unable to take challenges   Dynamic: FAIR: Needs CONTACT GUARD during gait    Outcome Measure: AM-PAC 6 CLICK MOBILITY  Total Score:19     Patient/Caregiver Education:     Therapist educated pt/caregiver regarding:   PT POC and goals for therapy   Seated therex recommendations   Safety with mobility and fall risk   Instruction on use of call button and importance of calling nursing staff for assistance with mobility   Time provided for therapeutic counseling and discussion of current health disposition. All questions answered to satisfaction, within scope of PT practice     Patient/caregiver able to verbalize understanding and expressed no further questions this visit; will follow-up with pt/caregiver during current admit for additional questions/concerns within scope of practice.     White board updated.     Patient left HOB elevated with all lines intact, call button in reach, and RN notified.    GOALS:   Multidisciplinary Problems       Physical Therapy Goals          Problem: Physical Therapy    Goal Priority Disciplines Outcome Goal Variances Interventions   Physical Therapy Goal     PT, PT/OT Ongoing, Progressing     Description: Goals to be met by: 23     Patient will increase functional independence with mobility by performin. Supine <> sit with Modified [place]  2. Sit to stand transfer with Modified [place]  3. Gait  x 100 feet with Supervision using LRAD  4. Stand for 5 minutes with Supervision using LRAD with no LOB   5. Lower extremity exercise program x20 reps per handout, with supervision                           History:     Past Medical History:   Diagnosis Date    Carotid artery disease     Cervical spondylosis without myelopathy 2014    Fibromyalgia     Fracture of wrist     Fracture, ankle     both at diff times    Glaucoma     Hyperparathyroidism     Hypothyroidism      Osteoporosis, post-menopausal     Unspecified vitamin D deficiency 10/30/2012       Past Surgical History:   Procedure Laterality Date    APPENDECTOMY      Bilateral Levator Repair  7/03    [facility] (Sangeeta Still)    CARDIAC PACEMAKER PLACEMENT      carotid endarterectomy      CATARACT EXTRACTION W/  INTRAOCULAR LENS IMPLANT Bilateral 1990's        CATARACT EXTRACTION W/ INTRAOCULAR LENS IMPLANTW/ TRABECULECTOMY Right 03/1994    [facility] ( Dr. Ott)    CORONARY ANGIOPLASTY WITH STENT PLACEMENT      ESOPHAGOGASTRODUODENOSCOPY N/A 5/31/2021    Procedure: EGD (ESOPHAGOGASTRODUODENOSCOPY);  Surgeon: Brooks Sanchez MD;  Location: [facility] ([address] [place] [address]);  Service: Endoscopy;  Laterality: N/A;    HYSTERECTOMY      Ptosis Revision Left 10/2003    [facility] (Dr. Ott)    RETINAL DETACHMENT SURGERY  2/02    [facility] (Dr. Mckeon)    thyroid nodule      WRIST SURGERY         Time Tracking:     PT Received On: 03/24/23  PT Start Time: 1350     PT Stop Time: 1408  PT Total Time (min): 18 min     Billable Minutes: Evaluation 10 min and Therapeutic Activity 8 min    03/24/2023

## 2023-03-24 NOTE — PLAN OF CARE
Patient laying in bed resting comfortably at this time in no acute distress. Patient has no complaints and denies any chest pain or shortness of breath. Vital signs are stable. Safety measures in place and call bell within reach.     Problem: Adult Inpatient Plan of Care  Goal: Plan of Care Review  Outcome: Ongoing, Progressing  Goal: Patient-Specific Goal (Individualized)  Outcome: Ongoing, Progressing  Goal: Absence of Hospital-Acquired Illness or Injury  Outcome: Ongoing, Progressing  Goal: Optimal Comfort and Wellbeing  Outcome: Ongoing, Progressing  Goal: Readiness for Transition of Care  Outcome: Ongoing, Progressing     Problem: Adjustment to Illness (Acute Coronary Syndrome)  Goal: Optimal Adaptation to Illness  Outcome: Ongoing, Progressing     Problem: Dysrhythmia (Acute Coronary Syndrome)  Goal: Normalized Cardiac Rhythm  Outcome: Ongoing, Progressing     Problem: Cardiac-Related Pain (Acute Coronary Syndrome)  Goal: Absence of Cardiac-Related Pain  Outcome: Ongoing, Progressing     Problem: Hemodynamic Instability (Acute Coronary Syndrome)  Goal: Effective Cardiac Pump Function  Outcome: Ongoing, Progressing     Problem: Tissue Perfusion (Acute Coronary Syndrome)  Goal: Adequate Tissue Perfusion  Outcome: Ongoing, Progressing     Problem: Arrhythmia/Dysrhythmia (Cardiac Catheterization)  Goal: Stable Heart Rate and Rhythm  Outcome: Ongoing, Progressing     Problem: Bleeding (Cardiac Catheterization)  Goal: Absence of Bleeding  Outcome: Ongoing, Progressing     Problem: Contrast-Induced Injury Risk (Cardiac Catheterization)  Goal: Absence of Contrast-Induced Injury  Outcome: Ongoing, Progressing     Problem: Embolism (Cardiac Catheterization)  Goal: Absence of Embolism Signs and Symptoms  Outcome: Ongoing, Progressing     Problem: Ongoing Anesthesia/Sedation Effects (Cardiac Catheterization)  Goal: Anesthesia/Sedation Recovery  Outcome: Ongoing, Progressing     Problem: Pain (Cardiac  Catheterization)  Goal: Acceptable Pain Control  Outcome: Ongoing, Progressing     Problem: Vascular Access Protection (Cardiac Catheterization)  Goal: Absence of Vascular Access Complication  Outcome: Ongoing, Progressing     Problem: Fluid and Electrolyte Imbalance (Acute Kidney Injury/Impairment)  Goal: Fluid and Electrolyte Balance  Outcome: Ongoing, Progressing     Problem: Oral Intake Inadequate (Acute Kidney Injury/Impairment)  Goal: Optimal Nutrition Intake  Outcome: Ongoing, Progressing     Problem: Renal Function Impairment (Acute Kidney Injury/Impairment)  Goal: Effective Renal Function  Outcome: Ongoing, Progressing     Problem: Skin Injury Risk Increased  Goal: Skin Health and Integrity  Outcome: Ongoing, Progressing     Problem: Fall Injury Risk  Goal: Absence of Fall and Fall-Related Injury  Outcome: Ongoing, Progressing

## 2023-03-24 NOTE — ASSESSMENT & PLAN NOTE
Held off initiating statin as part of ACS protocol    - close to goal, held zetia in setting of transaminase elevation

## 2023-03-24 NOTE — PLAN OF CARE
"Placed referral for [facility] for patient . Also provided daughter Tian With a list of sitters and home care services. She stated ,"thank you. "    Kirsten Chatman RN CM   [phone]    "

## 2023-03-24 NOTE — HOSPITAL COURSE
Patient admitted to hospital medicine for NSTEMI and treated with medical management based on goals of care conversations. She is also being managed for volume overload with IV lasix. She has continued to be chest pain free. PT/OT evaluating for needs. She seems to have progressive memory decline (chronic) and will evaluate her best discharge situation with continued GOC conversations. Pt's creatinine continued to rise. Obtained urine/serum osm, urine sodium, which lead us to thinking more prerenal and lasix were held with stabilization of renal function. Patient with soft BP and thought unlikely to tolerate GDMT for her HFrEF, so discharged on low dose coreg, holding lisinopril for OFELIA - with close cardiology follow up to assess for initiation of GDMT. Also with follow up labs and PCP visit to assess for resolution of OFELIA.

## 2023-03-24 NOTE — ASSESSMENT & PLAN NOTE
Takes lisinopril at home. Held due to OFELIA. Home Bps normal. Hypertensive in ED.     - started on amlodipine and coreg  - uptitration of coreg and anti-HTNs in setting of NSTEMI, HFrEF. Addition of entresto when able

## 2023-03-24 NOTE — ASSESSMENT & PLAN NOTE
RUQ US with doppler negative  Clinically without evidence of inflammatory conditions involving the gallbladder or liver  Improving with diuresis, likely congestive

## 2023-03-25 LAB
ALBUMIN SERPL BCP-MCNC: 2.6 G/DL (ref 3.5–5.2)
ALP SERPL-CCNC: 66 U/L (ref 55–135)
ALT SERPL W/O P-5'-P-CCNC: 68 U/L (ref 10–44)
ANION GAP SERPL CALC-SCNC: 10 MMOL/L (ref 8–16)
APTT PPP: 86.7 SEC (ref 21–32)
AST SERPL-CCNC: 57 U/L (ref 10–40)
BASOPHILS # BLD AUTO: 0.03 K/UL (ref 0–0.2)
BASOPHILS NFR BLD: 0.5 % (ref 0–1.9)
BILIRUB SERPL-MCNC: 0.4 MG/DL (ref 0.1–1)
BUN SERPL-MCNC: 43 MG/DL (ref 10–30)
CALCIUM SERPL-MCNC: 8.3 MG/DL (ref 8.7–10.5)
CHLORIDE SERPL-SCNC: 96 MMOL/L (ref 95–110)
CO2 SERPL-SCNC: 23 MMOL/L (ref 23–29)
CREAT SERPL-MCNC: 2.4 MG/DL (ref 0.5–1.4)
CREAT UR-MCNC: 68 MG/DL (ref 15–325)
CREAT UR-MCNC: 68 MG/DL (ref 15–325)
DIFFERENTIAL METHOD: ABNORMAL
EOSINOPHIL # BLD AUTO: 0 K/UL (ref 0–0.5)
EOSINOPHIL NFR BLD: 0.7 % (ref 0–8)
ERYTHROCYTE [DISTWIDTH] IN BLOOD BY AUTOMATED COUNT: 14.1 % (ref 11.5–14.5)
EST. GFR  (NO RACE VARIABLE): 17.7 ML/MIN/1.73 M^2
GLUCOSE SERPL-MCNC: 102 MG/DL (ref 70–110)
HCT VFR BLD AUTO: 29.2 % (ref 37–48.5)
HGB BLD-MCNC: 9.4 G/DL (ref 12–16)
IMM GRANULOCYTES # BLD AUTO: 0.02 K/UL (ref 0–0.04)
IMM GRANULOCYTES NFR BLD AUTO: 0.4 % (ref 0–0.5)
LYMPHOCYTES # BLD AUTO: 1.6 K/UL (ref 1–4.8)
LYMPHOCYTES NFR BLD: 27.5 % (ref 18–48)
MAGNESIUM SERPL-MCNC: 1.9 MG/DL (ref 1.6–2.6)
MCH RBC QN AUTO: 32.5 PG (ref 27–31)
MCHC RBC AUTO-ENTMCNC: 32.2 G/DL (ref 32–36)
MCV RBC AUTO: 101 FL (ref 82–98)
MONOCYTES # BLD AUTO: 0.5 K/UL (ref 0.3–1)
MONOCYTES NFR BLD: 9 % (ref 4–15)
NEUTROPHILS # BLD AUTO: 3.5 K/UL (ref 1.8–7.7)
NEUTROPHILS NFR BLD: 61.9 % (ref 38–73)
NRBC BLD-RTO: 0 /100 WBC
OSMOLALITY UR: 279 MOSM/KG (ref 50–1200)
PHOSPHATE SERPL-MCNC: 4.4 MG/DL (ref 2.7–4.5)
PLATELET # BLD AUTO: 188 K/UL (ref 150–450)
PMV BLD AUTO: 12 FL (ref 9.2–12.9)
POTASSIUM SERPL-SCNC: 3.6 MMOL/L (ref 3.5–5.1)
PROT SERPL-MCNC: 7.3 G/DL (ref 6–8.4)
PROT UR-MCNC: 9 MG/DL (ref 0–15)
PROT/CREAT UR: 0.13 MG/G{CREAT} (ref 0–0.2)
RBC # BLD AUTO: 2.89 M/UL (ref 4–5.4)
SODIUM SERPL-SCNC: 129 MMOL/L (ref 136–145)
SODIUM UR-SCNC: 34 MMOL/L (ref 20–250)
UUN UR-MCNC: 318 MG/DL (ref 140–1050)
WBC # BLD AUTO: 5.67 K/UL (ref 3.9–12.7)

## 2023-03-25 PROCEDURE — 93005 ELECTROCARDIOGRAM TRACING: CPT | Mod: HCNC

## 2023-03-25 PROCEDURE — 84100 ASSAY OF PHOSPHORUS: CPT | Mod: HCNC

## 2023-03-25 PROCEDURE — 93010 ELECTROCARDIOGRAM REPORT: CPT | Mod: HCNC,,, | Performed by: INTERNAL MEDICINE

## 2023-03-25 PROCEDURE — 36415 COLL VENOUS BLD VENIPUNCTURE: CPT | Mod: HCNC | Performed by: EMERGENCY MEDICINE

## 2023-03-25 PROCEDURE — 99233 SBSQ HOSP IP/OBS HIGH 50: CPT | Mod: HCNC,GC,, | Performed by: STUDENT IN AN ORGANIZED HEALTH CARE EDUCATION/TRAINING PROGRAM

## 2023-03-25 PROCEDURE — 99233 PR SUBSEQUENT HOSPITAL CARE,LEVL III: ICD-10-PCS | Mod: HCNC,GC,, | Performed by: STUDENT IN AN ORGANIZED HEALTH CARE EDUCATION/TRAINING PROGRAM

## 2023-03-25 PROCEDURE — 20600001 HC STEP DOWN PRIVATE ROOM: Mod: HCNC

## 2023-03-25 PROCEDURE — 84300 ASSAY OF URINE SODIUM: CPT | Mod: HCNC

## 2023-03-25 PROCEDURE — 63600175 PHARM REV CODE 636 W HCPCS: Mod: HCNC | Performed by: STUDENT IN AN ORGANIZED HEALTH CARE EDUCATION/TRAINING PROGRAM

## 2023-03-25 PROCEDURE — 84540 ASSAY OF URINE/UREA-N: CPT | Mod: HCNC

## 2023-03-25 PROCEDURE — 25000003 PHARM REV CODE 250: Mod: HCNC

## 2023-03-25 PROCEDURE — 25000003 PHARM REV CODE 250: Mod: HCNC | Performed by: STUDENT IN AN ORGANIZED HEALTH CARE EDUCATION/TRAINING PROGRAM

## 2023-03-25 PROCEDURE — 85730 THROMBOPLASTIN TIME PARTIAL: CPT | Mod: HCNC | Performed by: EMERGENCY MEDICINE

## 2023-03-25 PROCEDURE — 83935 ASSAY OF URINE OSMOLALITY: CPT | Mod: HCNC

## 2023-03-25 PROCEDURE — 85025 COMPLETE CBC W/AUTO DIFF WBC: CPT | Mod: HCNC | Performed by: EMERGENCY MEDICINE

## 2023-03-25 PROCEDURE — 83735 ASSAY OF MAGNESIUM: CPT | Mod: HCNC

## 2023-03-25 PROCEDURE — 80053 COMPREHEN METABOLIC PANEL: CPT | Mod: HCNC

## 2023-03-25 PROCEDURE — 93010 EKG 12-LEAD: ICD-10-PCS | Mod: HCNC,,, | Performed by: INTERNAL MEDICINE

## 2023-03-25 PROCEDURE — 84156 ASSAY OF PROTEIN URINE: CPT | Mod: HCNC

## 2023-03-25 RX ORDER — CARVEDILOL 3.12 MG/1
3.12 TABLET ORAL 2 TIMES DAILY
Status: DISCONTINUED | OUTPATIENT
Start: 2023-03-25 | End: 2023-03-25

## 2023-03-25 RX ORDER — FUROSEMIDE 10 MG/ML
40 INJECTION INTRAMUSCULAR; INTRAVENOUS ONCE
Status: COMPLETED | OUTPATIENT
Start: 2023-03-25 | End: 2023-03-25

## 2023-03-25 RX ADMIN — DORZOLAMIDE HYDROCHLORIDE 1 DROP: 20 SOLUTION/ DROPS OPHTHALMIC at 09:03

## 2023-03-25 RX ADMIN — LEVOTHYROXINE SODIUM 50 MCG: 50 TABLET ORAL at 05:03

## 2023-03-25 RX ADMIN — CLOPIDOGREL BISULFATE 75 MG: 75 TABLET ORAL at 09:03

## 2023-03-25 RX ADMIN — LATANOPROST 1 DROP: 50 SOLUTION OPHTHALMIC at 08:03

## 2023-03-25 RX ADMIN — PANTOPRAZOLE SODIUM 40 MG: 40 TABLET, DELAYED RELEASE ORAL at 09:03

## 2023-03-25 RX ADMIN — FUROSEMIDE 40 MG: 10 INJECTION, SOLUTION INTRAMUSCULAR; INTRAVENOUS at 09:03

## 2023-03-25 RX ADMIN — BRIMONIDINE TARTRATE 1 DROP: 1.5 SOLUTION/ DROPS OPHTHALMIC at 09:03

## 2023-03-25 RX ADMIN — GABAPENTIN 100 MG: 100 CAPSULE ORAL at 09:03

## 2023-03-25 RX ADMIN — ASPIRIN 81 MG 81 MG: 81 TABLET ORAL at 09:03

## 2023-03-25 RX ADMIN — CARVEDILOL 6.25 MG: 6.25 TABLET, FILM COATED ORAL at 09:03

## 2023-03-25 NOTE — PROGRESS NOTES
[facility] - Cardiology [facility] Medicine  Progress Note    Patient Name: Cat Bolivar  MRN: [id number]  Patient Class: IP- Inpatient   Admission Date: 3/22/2023  Length of Stay: 3 days  Attending Physician: Boone Waller MD  Primary Care Provider: Primary Doctor No        Subjective:     Principal Problem:NSTEMI (non-ST elevated myocardial infarction)        HPI:  89 y.o. female with PMHx of fibromyalgia, HTN, CAD s/p stent placement around 2005, HLD, prediabetes, glaucoma, anxiety, hypothyroidism, osteoporosis presenting with acute onset of chest pain that has been going on since last night.  She states she has had pain like this for years and thinks it is secondary to her fibromyalgia. The pain is reproducible when she pushes on her chest.  She denies any shortness of breath, nausea, vomiting, diaphoresis. Pain is located substernally, nonexertional, does not take nitroglycerin at home. Heating pads help. Currently pain free at time of evaluation. She did not wish to come to the emergency department but her family got scared when she was complaining of chest pain and called EMS. Denies tobacco use, but has had some exposure to secondhand smoke from her . Denies orthopnea, PND, leg swelling abdominal swelling, abnormal UOP, excessive salt intake beyond her usual. Reports drinks very little at baseline. Reports medication compliance but stated meds are very different from her personal list and meds listed in chart. Does not take lasix or aldactone (listed on her med list paper in her folder, aldactone 12.5mg and lasix 20mg prn for SBP>150). Denies previous history of heart failure. Home blood pressures are normally in 110-130s systolic. Has been out of her xanax for a month, takes it for sleep and anxiety (takes no SSRIs). At baseline she ambulates well, barely needing her walker. Has remote hx of fall, but her balance has been good lately. She reports following with Dr. Veliz. Reportedly  "takes plavix 3x/week per his instructions, but expresses confusion regarding this. Unclear why she has statin intolerance listed per chart review. She and her daughter were unable to provide specific information about what statin induced reaction she might've had. Reports taking Zetia sometimes. Also unclear what allergy she had to aspirin (listed as "headache" in chart), but patient denies being on aspirin or any specific known reaction to it.     In ED, labs were significant for troponin of 8.141, Cr 1.8, BUN 40, Na 134, BNP 1022, , . BP was elevated to 182/66 in ED, on room air, afebrile, HR normal. She wishes to be DNR and does not want invasive intervention such as cardiac cath. Admitted to [facility] for ACS protocol. Has been starting on ticagrelor and heparin drip in ED.       Overview/Hospital Course:  Patient admitted to hospital medicine for NSTEMI and treated with medical management based on goals of care conversations. She is also being managed for volume overload with IV lasix. She has continued to be chest pain free. PT/OT evaluating for needs. She seems to have progressive memory decline (chronic) and will evaluate her best discharge situation with continued GOC conversations. Pt's creatinine continues to rise. Obtaining urine/serum osm, urine sodium      Interval History: Patient doing well on ACS protocol and without chest pain. She has been up walking around with walk assist.     Review of Systems   Constitutional:  Negative for appetite change and fever.   HENT:  Negative for congestion and sinus pain.    Eyes:  Negative for discharge and redness.   Respiratory:  Negative for cough and shortness of breath.    Cardiovascular:  Negative for chest pain and leg swelling.   Gastrointestinal:  Negative for diarrhea and nausea.   Endocrine: Negative for polydipsia and polyuria.   Genitourinary:  Negative for dysuria and hematuria.   Musculoskeletal:  Negative for arthralgias and " myalgias.   Skin:  Negative for color change and rash.   Neurological:  Negative for weakness and numbness.   Psychiatric/Behavioral:  Negative for agitation and confusion.    Objective:     Vital Signs (Most Recent):  Temp: 96.2 °F (35.7 °C) (03/25/23 1133)  Pulse: 65 (03/25/23 1134)  Resp: 18 (03/25/23 1133)  BP: (!) 85/53 (03/25/23 1134)  SpO2: 98 % (03/25/23 1133)   Vital Signs (24h Range):  Temp:  [96.2 °F (35.7 °C)-98.3 °F (36.8 °C)] 96.2 °F (35.7 °C)  Pulse:  [65-75] 65  Resp:  [16-19] 18  SpO2:  [94 %-98 %] 98 %  BP: ()/(50-71) 85/53     Weight:  (Pt refused nurse was notified.)  Body mass index is 18.39 kg/m².    Intake/Output Summary (Last 24 hours) at 3/25/2023 1403  Last data filed at 3/25/2023 0800  Gross per 24 hour   Intake 360 ml   Output 500 ml   Net -140 ml        Physical Exam  Constitutional:       General: She is not in acute distress.     Appearance: Normal appearance.   HENT:      Head: Normocephalic and atraumatic.      Mouth/Throat:      Mouth: Mucous membranes are moist.      Pharynx: Oropharynx is clear.   Eyes:      Conjunctiva/sclera: Conjunctivae normal.      Pupils: Pupils are equal, round, and reactive to light.   Cardiovascular:      Rate and Rhythm: Normal rate and regular rhythm.      Pulses: Normal pulses.      Heart sounds: Normal heart sounds.      Comments: JVP about 6cm above sternal notch. improving  Pulmonary:      Effort: Pulmonary effort is normal.      Breath sounds: Rales (bibasilar) present.   Abdominal:      General: Abdomen is flat.      Palpations: Abdomen is soft.   Musculoskeletal:         General: No swelling or deformity.      Cervical back: Normal range of motion and neck supple. No tenderness.   Skin:     General: Skin is warm and dry.   Neurological:      General: No focal deficit present.      Mental Status: She is alert and oriented to person, place, and time.   Psychiatric:         Mood and Affect: Mood normal.         Behavior: Behavior normal.        Significant Labs: All pertinent labs within the past 24 hours have been reviewed.    Significant Imaging: I have reviewed all pertinent imaging results/findings within the past 24 hours.      Assessment/Plan:      * NSTEMI (non-ST elevated myocardial infarction)  Presented with chest pain that is reproducible with palpation.   EKG showed: paced rhythm, no ischemic changes  Trop up to 21, stopped trend. BNP 1022  MAR 3, [address] 132  Renal fxn: OFELIA  GOC: pursue medical management    Plan:  - ACS protocol with ASA, Plavix, Heparin 48 hours. DNR, no cath. Medical mgmt  - TTE with reduced EF 35%  - Statin: intolerance. Held Zetia due to liver dysfunction  - B-blocker: coreg  - ACEi/ARB: held lisinopril due to ofelia  - NTG/repeat EKG PRN for chest pain    HFrEF (heart failure with reduced ejection fraction)  EF 35% in the setting of CAD, NSTEMI, likely secondary to ischemic cardiomyopathy.  Cardiology consulted. Medical mgmt of NSTEMI as above.   Trending OFELIA, likely CRS. Hepatic function labs improving, likely congestive hepatopathy.  - Coreg 6.25mg BID, goal HR 60, lowered to 3.125 due to hypotension  - was on ACE. Held due to OFELIA. Will start entresto when OFELIA resolves. Needs further HTN control  - IV diuresis for now. Volume up on exam. Trending renal fxn. Depending on living situation on discharge will either write prn lasix or maybe 2-3 times weekly  - Outpatient cards f/u on d/c    Transaminitis  RUQ US with doppler negative  Clinically without evidence of inflammatory conditions involving the gallbladder or liver  Improving with diuresis, likely congestive      OFELIA (acute kidney injury)  Creatinine 1.8 on admit, baseline around 1.2  - Pre-renal with low PO intake versus cardiorenal syndrome in the setting of new ischemic cardiomyopathy/volume overload  Lab Results   Component Value Date    CREATININE 2.4 (H) 03/25/2023       Plan:   - Strict I&Os and daily weights   - Daily BMP  - Trend sCr  - Avoid nephrotoxic  agents such as NSAIDs, gadolinium, ACEi, ARBs and IV radiocontrast.  - Renally dose meds to current GFR.  - Maintain MAP > 65.  - No indications for HD at this time.   - Maintain electrolytes at goal: Mg > 2, Phos > 3, K > 4.  Creatinine continues to worsen will get urine osm, urine na, and serum osm monitoring urine output    Primary hypertension  Takes lisinopril at home. Held due to OFELIA. Home Bps normal. Hypertensive in ED.     - started on amlodipine and coreg (some hypotension, reduced to 3.125)  - uptitration of coreg and anti-HTNs in setting of NSTEMI, HFrEF. Addition of entresto when able    Gastrointestinal hemorrhage with melena  Hx of gastric ulcer possibly 2/2 to NSAID use. Denies current GIB      Statin intolerance  Held off initiating statin as part of ACS protocol    - close to goal, held zetia in setting of transaminase elevation    Glaucoma  Resume home drops      Presence of stent in coronary artery  S/p stent around 2005      Anxiety  Denies taking sertraline and escitalopram listed on her home med printout sheet. Only takes xanax 0.25 prn at night to sleep. Has been out of that prescription for a month.     - low risk of benzo withdrawal  - xanax 0.25 prn      Pacemaker  noted      Hypothyroidism  Continue home synthroid        VTE Risk Mitigation (From admission, onward)         Ordered     heparin 25,000 units in dextrose 5% (100 units/ml) IV bolus from bag - ADDITIONAL PRN BOLUS - 60 units/kg (max bolus 4000 units)  As needed (PRN)        Question:  Heparin Infusion Adjustment (DO NOT MODIFY ANSWER)  Answer:  [email or web address]\[email or web address]\Images\Pharmacy\HeparinInfusions\heparin LOW INTENSITY nomogram for [facility] [id number]    03/22/23 2102     heparin 25,000 units in dextrose 5% (100 units/ml) IV bolus from bag - ADDITIONAL PRN BOLUS - 30 units/kg (max bolus 4000 units)  As needed (PRN)        Question:  Heparin Infusion Adjustment (DO NOT MODIFY ANSWER)  Answer:   \\[email or web address]\[email or web address]\Images\Pharmacy\HeparinInfusions\heparin LOW INTENSITY nomogram for [facility] [id number]    03/22/23 2109     heparin 25,000 units in dextrose 5% 250 mL (100 units/mL) infusion LOW INTENSITY nomogram - [facility]  Continuous        Question Answer Comment   Heparin Infusion Adjustment (DO NOT MODIFY ANSWER) \\[email or web address]\[email or web address]\Images\Pharmacy\HeparinInfusions\heparin LOW INTENSITY nomogram for [facility] [id number].pdf    Begin at (in units/kg/hr) 12        03/22/23 2109     IP VTE HIGH RISK PATIENT  Once         03/22/23 2259     Place sequential compression device  Until discontinued         03/22/23 2259     Place sequential compression device  Until discontinued         03/22/23 2140     Reason for no Mechanical VTE Prophylaxis  Once        Question:  Reasons:  Answer:  Physician Provided (leave comment)  Comment:  on heparin drip    03/22/23 2140                Discharge Planning   WADE: 3/25/2023     Code Status: DNR   Is the patient medically ready for discharge?:     Reason for patient still in hospital (select all that apply): Patient trending condition  Discharge Plan A: Home with family, Home Health                  Misael Weber DO  Department of Hospital Medicine   [facility] - Cardiology Stepdown

## 2023-03-25 NOTE — PLAN OF CARE
Problem: Adult Inpatient Plan of Care  Goal: Plan of Care Review  Outcome: Ongoing, Progressing  Goal: Patient-Specific Goal (Individualized)  Outcome: Ongoing, Progressing  Goal: Absence of Hospital-Acquired Illness or Injury  Outcome: Ongoing, Progressing  Goal: Optimal Comfort and Wellbeing  Outcome: Ongoing, Progressing  Goal: Readiness for Transition of Care  Outcome: Ongoing, Progressing     Problem: Adjustment to Illness (Acute Coronary Syndrome)  Goal: Optimal Adaptation to Illness  Outcome: Ongoing, Progressing     Problem: Dysrhythmia (Acute Coronary Syndrome)  Goal: Normalized Cardiac Rhythm  Outcome: Ongoing, Progressing     Problem: Cardiac-Related Pain (Acute Coronary Syndrome)  Goal: Absence of Cardiac-Related Pain  Outcome: Ongoing, Progressing     Problem: Hemodynamic Instability (Acute Coronary Syndrome)  Goal: Effective Cardiac Pump Function  Outcome: Ongoing, Progressing     Problem: Tissue Perfusion (Acute Coronary Syndrome)  Goal: Adequate Tissue Perfusion  Outcome: Ongoing, Progressing     Problem: Arrhythmia/Dysrhythmia (Cardiac Catheterization)  Goal: Stable Heart Rate and Rhythm  Outcome: Ongoing, Progressing     Problem: Bleeding (Cardiac Catheterization)  Goal: Absence of Bleeding  Outcome: Ongoing, Progressing     Problem: Contrast-Induced Injury Risk (Cardiac Catheterization)  Goal: Absence of Contrast-Induced Injury  Outcome: Ongoing, Progressing     Problem: Embolism (Cardiac Catheterization)  Goal: Absence of Embolism Signs and Symptoms  Outcome: Ongoing, Progressing     Problem: Ongoing Anesthesia/Sedation Effects (Cardiac Catheterization)  Goal: Anesthesia/Sedation Recovery  Outcome: Ongoing, Progressing     Problem: Pain (Cardiac Catheterization)  Goal: Acceptable Pain Control  Outcome: Ongoing, Progressing     Problem: Vascular Access Protection (Cardiac Catheterization)  Goal: Absence of Vascular Access Complication  Outcome: Ongoing, Progressing     Problem: Fluid and  Electrolyte Imbalance (Acute Kidney Injury/Impairment)  Goal: Fluid and Electrolyte Balance  Outcome: Ongoing, Progressing     Problem: Oral Intake Inadequate (Acute Kidney Injury/Impairment)  Goal: Optimal Nutrition Intake  Outcome: Ongoing, Progressing     Problem: Renal Function Impairment (Acute Kidney Injury/Impairment)  Goal: Effective Renal Function  Outcome: Ongoing, Progressing     Problem: Skin Injury Risk Increased  Goal: Skin Health and Integrity  Outcome: Ongoing, Progressing     Problem: Fall Injury Risk  Goal: Absence of Fall and Fall-Related Injury  Outcome: Ongoing, Progressing

## 2023-03-25 NOTE — SUBJECTIVE & OBJECTIVE
Interval History: Patient doing well on ACS protocol and without chest pain. She has been up walking around with walk assist.     Review of Systems   Constitutional:  Negative for appetite change and fever.   HENT:  Negative for congestion and sinus pain.    Eyes:  Negative for discharge and redness.   Respiratory:  Negative for cough and shortness of breath.    Cardiovascular:  Negative for chest pain and leg swelling.   Gastrointestinal:  Negative for diarrhea and nausea.   Endocrine: Negative for polydipsia and polyuria.   Genitourinary:  Negative for dysuria and hematuria.   Musculoskeletal:  Negative for arthralgias and myalgias.   Skin:  Negative for color change and rash.   Neurological:  Negative for weakness and numbness.   Psychiatric/Behavioral:  Negative for agitation and confusion.    Objective:     Vital Signs (Most Recent):  Temp: 96.2 °F (35.7 °C) (03/25/23 1133)  Pulse: 65 (03/25/23 1134)  Resp: 18 (03/25/23 1133)  BP: (!) 85/53 (03/25/23 1134)  SpO2: 98 % (03/25/23 1133)   Vital Signs (24h Range):  Temp:  [96.2 °F (35.7 °C)-98.3 °F (36.8 °C)] 96.2 °F (35.7 °C)  Pulse:  [65-75] 65  Resp:  [16-19] 18  SpO2:  [94 %-98 %] 98 %  BP: ()/(50-71) 85/53     Weight:  (Pt refused nurse was notified.)  Body mass index is 18.39 kg/m².    Intake/Output Summary (Last 24 hours) at 3/25/2023 1403  Last data filed at 3/25/2023 0800  Gross per 24 hour   Intake 360 ml   Output 500 ml   Net -140 ml        Physical Exam  Constitutional:       General: She is not in acute distress.     Appearance: Normal appearance.   HENT:      Head: Normocephalic and atraumatic.      Mouth/Throat:      Mouth: Mucous membranes are moist.      Pharynx: Oropharynx is clear.   Eyes:      Conjunctiva/sclera: Conjunctivae normal.      Pupils: Pupils are equal, round, and reactive to light.   Cardiovascular:      Rate and Rhythm: Normal rate and regular rhythm.      Pulses: Normal pulses.      Heart sounds: Normal heart sounds.       Comments: JVP about 6cm above sternal notch. improving  Pulmonary:      Effort: Pulmonary effort is normal.      Breath sounds: Rales (bibasilar) present.   Abdominal:      General: Abdomen is flat.      Palpations: Abdomen is soft.   Musculoskeletal:         General: No swelling or deformity.      Cervical back: Normal range of motion and neck supple. No tenderness.   Skin:     General: Skin is warm and dry.   Neurological:      General: No focal deficit present.      Mental Status: She is alert and oriented to person, place, and time.   Psychiatric:         Mood and Affect: Mood normal.         Behavior: Behavior normal.       Significant Labs: All pertinent labs within the past 24 hours have been reviewed.    Significant Imaging: I have reviewed all pertinent imaging results/findings within the past 24 hours.

## 2023-03-25 NOTE — ASSESSMENT & PLAN NOTE
EF 35% in the setting of CAD, NSTEMI, likely secondary to ischemic cardiomyopathy.  Cardiology consulted. Medical mgmt of NSTEMI as above.   Trending OFELIA, likely CRS. Hepatic function labs improving, likely congestive hepatopathy.  - Coreg 6.25mg BID, goal HR 60, lowered to 3.125 due to hypotension  - was on ACE. Held due to OFELIA. Will start entresto when OFELIA resolves. Needs further HTN control  - IV diuresis for now. Volume up on exam. Trending renal fxn. Depending on living situation on discharge will either write prn lasix or maybe 2-3 times weekly  - Outpatient cards f/u on d/c

## 2023-03-25 NOTE — ASSESSMENT & PLAN NOTE
Creatinine 1.8 on admit, baseline around 1.2  - Pre-renal with low PO intake versus cardiorenal syndrome in the setting of new ischemic cardiomyopathy/volume overload  Lab Results   Component Value Date    CREATININE 2.4 (H) 03/25/2023       Plan:   - Strict I&Os and daily weights   - Daily BMP  - Trend sCr  - Avoid nephrotoxic agents such as NSAIDs, gadolinium, ACEi, ARBs and IV radiocontrast.  - Renally dose meds to current GFR.  - Maintain MAP > 65.  - No indications for HD at this time.   - Maintain electrolytes at goal: Mg > 2, Phos > 3, K > 4.  Creatinine continues to worsen will get urine osm, urine na, and serum osm monitoring urine output

## 2023-03-26 LAB
ALBUMIN SERPL BCP-MCNC: 2.6 G/DL (ref 3.5–5.2)
ALP SERPL-CCNC: 79 U/L (ref 55–135)
ALT SERPL W/O P-5'-P-CCNC: 75 U/L (ref 10–44)
ANION GAP SERPL CALC-SCNC: 11 MMOL/L (ref 8–16)
AST SERPL-CCNC: 69 U/L (ref 10–40)
BASOPHILS # BLD AUTO: 0.02 K/UL (ref 0–0.2)
BASOPHILS NFR BLD: 0.4 % (ref 0–1.9)
BILIRUB SERPL-MCNC: 0.3 MG/DL (ref 0.1–1)
BUN SERPL-MCNC: 51 MG/DL (ref 10–30)
CALCIUM SERPL-MCNC: 8.4 MG/DL (ref 8.7–10.5)
CHLORIDE SERPL-SCNC: 94 MMOL/L (ref 95–110)
CO2 SERPL-SCNC: 23 MMOL/L (ref 23–29)
CREAT SERPL-MCNC: 2.5 MG/DL (ref 0.5–1.4)
DIFFERENTIAL METHOD: ABNORMAL
EOSINOPHIL # BLD AUTO: 0 K/UL (ref 0–0.5)
EOSINOPHIL NFR BLD: 0.6 % (ref 0–8)
ERYTHROCYTE [DISTWIDTH] IN BLOOD BY AUTOMATED COUNT: 13.8 % (ref 11.5–14.5)
EST. GFR  (NO RACE VARIABLE): 16.9 ML/MIN/1.73 M^2
GLUCOSE SERPL-MCNC: 113 MG/DL (ref 70–110)
HCT VFR BLD AUTO: 30.7 % (ref 37–48.5)
HGB BLD-MCNC: 9.8 G/DL (ref 12–16)
IMM GRANULOCYTES # BLD AUTO: 0.02 K/UL (ref 0–0.04)
IMM GRANULOCYTES NFR BLD AUTO: 0.4 % (ref 0–0.5)
LYMPHOCYTES # BLD AUTO: 1.5 K/UL (ref 1–4.8)
LYMPHOCYTES NFR BLD: 29.2 % (ref 18–48)
MAGNESIUM SERPL-MCNC: 2 MG/DL (ref 1.6–2.6)
MCH RBC QN AUTO: 32.2 PG (ref 27–31)
MCHC RBC AUTO-ENTMCNC: 31.9 G/DL (ref 32–36)
MCV RBC AUTO: 101 FL (ref 82–98)
MONOCYTES # BLD AUTO: 0.4 K/UL (ref 0.3–1)
MONOCYTES NFR BLD: 7.4 % (ref 4–15)
NEUTROPHILS # BLD AUTO: 3.3 K/UL (ref 1.8–7.7)
NEUTROPHILS NFR BLD: 62 % (ref 38–73)
NRBC BLD-RTO: 0 /100 WBC
OSMOLALITY SERPL: 296 MOSM/KG (ref 275–295)
PHOSPHATE SERPL-MCNC: 4.6 MG/DL (ref 2.7–4.5)
PLATELET # BLD AUTO: 186 K/UL (ref 150–450)
PMV BLD AUTO: 11.9 FL (ref 9.2–12.9)
POTASSIUM SERPL-SCNC: 3.9 MMOL/L (ref 3.5–5.1)
PROT SERPL-MCNC: 7.5 G/DL (ref 6–8.4)
RBC # BLD AUTO: 3.04 M/UL (ref 4–5.4)
SODIUM SERPL-SCNC: 128 MMOL/L (ref 136–145)
WBC # BLD AUTO: 5.24 K/UL (ref 3.9–12.7)

## 2023-03-26 PROCEDURE — 20600001 HC STEP DOWN PRIVATE ROOM: Mod: HCNC

## 2023-03-26 PROCEDURE — 25000003 PHARM REV CODE 250: Mod: HCNC | Performed by: EMERGENCY MEDICINE

## 2023-03-26 PROCEDURE — 80053 COMPREHEN METABOLIC PANEL: CPT | Mod: HCNC

## 2023-03-26 PROCEDURE — 25000003 PHARM REV CODE 250: Mod: HCNC

## 2023-03-26 PROCEDURE — 99233 SBSQ HOSP IP/OBS HIGH 50: CPT | Mod: HCNC,GC,, | Performed by: STUDENT IN AN ORGANIZED HEALTH CARE EDUCATION/TRAINING PROGRAM

## 2023-03-26 PROCEDURE — 83930 ASSAY OF BLOOD OSMOLALITY: CPT | Mod: HCNC

## 2023-03-26 PROCEDURE — 93010 EKG 12-LEAD: ICD-10-PCS | Mod: HCNC,,, | Performed by: INTERNAL MEDICINE

## 2023-03-26 PROCEDURE — 83735 ASSAY OF MAGNESIUM: CPT | Mod: HCNC

## 2023-03-26 PROCEDURE — 93005 ELECTROCARDIOGRAM TRACING: CPT | Mod: HCNC

## 2023-03-26 PROCEDURE — 93010 ELECTROCARDIOGRAM REPORT: CPT | Mod: HCNC,,, | Performed by: INTERNAL MEDICINE

## 2023-03-26 PROCEDURE — 85025 COMPLETE CBC W/AUTO DIFF WBC: CPT | Mod: HCNC | Performed by: EMERGENCY MEDICINE

## 2023-03-26 PROCEDURE — 36415 COLL VENOUS BLD VENIPUNCTURE: CPT | Mod: HCNC

## 2023-03-26 PROCEDURE — 99233 PR SUBSEQUENT HOSPITAL CARE,LEVL III: ICD-10-PCS | Mod: HCNC,GC,, | Performed by: STUDENT IN AN ORGANIZED HEALTH CARE EDUCATION/TRAINING PROGRAM

## 2023-03-26 PROCEDURE — 84100 ASSAY OF PHOSPHORUS: CPT | Mod: HCNC

## 2023-03-26 RX ORDER — CARVEDILOL 3.12 MG/1
3.12 TABLET ORAL 2 TIMES DAILY
Status: DISCONTINUED | OUTPATIENT
Start: 2023-03-26 | End: 2023-03-27 | Stop reason: HOSPADM

## 2023-03-26 RX ADMIN — CARVEDILOL 3.12 MG: 3.12 TABLET, FILM COATED ORAL at 09:03

## 2023-03-26 RX ADMIN — Medication 6 MG: at 08:03

## 2023-03-26 RX ADMIN — ASPIRIN 81 MG 81 MG: 81 TABLET ORAL at 09:03

## 2023-03-26 RX ADMIN — DORZOLAMIDE HYDROCHLORIDE 1 DROP: 20 SOLUTION/ DROPS OPHTHALMIC at 09:03

## 2023-03-26 RX ADMIN — LEVOTHYROXINE SODIUM 50 MCG: 50 TABLET ORAL at 05:03

## 2023-03-26 RX ADMIN — BRIMONIDINE TARTRATE 1 DROP: 1.5 SOLUTION/ DROPS OPHTHALMIC at 09:03

## 2023-03-26 RX ADMIN — CLOPIDOGREL BISULFATE 75 MG: 75 TABLET ORAL at 09:03

## 2023-03-26 RX ADMIN — LATANOPROST 1 DROP: 50 SOLUTION OPHTHALMIC at 08:03

## 2023-03-26 RX ADMIN — PANTOPRAZOLE SODIUM 40 MG: 40 TABLET, DELAYED RELEASE ORAL at 09:03

## 2023-03-26 RX ADMIN — CARVEDILOL 3.12 MG: 3.12 TABLET, FILM COATED ORAL at 08:03

## 2023-03-26 NOTE — PROGRESS NOTES
[facility] - Cardiology [facility] Medicine  Progress Note    Patient Name: Cat Bolivar  MRN: [id number]  Patient Class: IP- Inpatient   Admission Date: 3/22/2023  Length of Stay: 4 days  Attending Physician: Boone Waller MD  Primary Care Provider: Primary Doctor No        Subjective:     Principal Problem:NSTEMI (non-ST elevated myocardial infarction)        HPI:  89 y.o. female with PMHx of fibromyalgia, HTN, CAD s/p stent placement around 2005, HLD, prediabetes, glaucoma, anxiety, hypothyroidism, osteoporosis presenting with acute onset of chest pain that has been going on since last night.  She states she has had pain like this for years and thinks it is secondary to her fibromyalgia. The pain is reproducible when she pushes on her chest.  She denies any shortness of breath, nausea, vomiting, diaphoresis. Pain is located substernally, nonexertional, does not take nitroglycerin at home. Heating pads help. Currently pain free at time of evaluation. She did not wish to come to the emergency department but her family got scared when she was complaining of chest pain and called EMS. Denies tobacco use, but has had some exposure to secondhand smoke from her . Denies orthopnea, PND, leg swelling abdominal swelling, abnormal UOP, excessive salt intake beyond her usual. Reports drinks very little at baseline. Reports medication compliance but stated meds are very different from her personal list and meds listed in chart. Does not take lasix or aldactone (listed on her med list paper in her folder, aldactone 12.5mg and lasix 20mg prn for SBP>150). Denies previous history of heart failure. Home blood pressures are normally in 110-130s systolic. Has been out of her xanax for a month, takes it for sleep and anxiety (takes no SSRIs). At baseline she ambulates well, barely needing her walker. Has remote hx of fall, but her balance has been good lately. She reports following with Dr. Veliz. Reportedly  "takes plavix 3x/week per his instructions, but expresses confusion regarding this. Unclear why she has statin intolerance listed per chart review. She and her daughter were unable to provide specific information about what statin induced reaction she might've had. Reports taking Zetia sometimes. Also unclear what allergy she had to aspirin (listed as "headache" in chart), but patient denies being on aspirin or any specific known reaction to it.     In ED, labs were significant for troponin of 8.141, Cr 1.8, BUN 40, Na 134, BNP 1022, , . BP was elevated to 182/66 in ED, on room air, afebrile, HR normal. She wishes to be DNR and does not want invasive intervention such as cardiac cath. Admitted to [facility] for ACS protocol. Has been starting on ticagrelor and heparin drip in ED.       Overview/Hospital Course:  Patient admitted to hospital medicine for NSTEMI and treated with medical management based on goals of care conversations. She is also being managed for volume overload with IV lasix. She has continued to be chest pain free. PT/OT evaluating for needs. She seems to have progressive memory decline (chronic) and will evaluate her best discharge situation with continued GOC conversations. Pt's creatinine continues to rise. Obtained urine/serum osm, urine sodium, which lead us to thinking more prerenal and lasix were held.      Interval History: Patient doing well on ACS protocol and without chest pain. She has been up walking around with walk assist. OFELIA continues to worsen. Holding lasix    Review of Systems   Constitutional:  Negative for appetite change and fever.   HENT:  Negative for congestion and sinus pain.    Eyes:  Negative for discharge and redness.   Respiratory:  Negative for cough and shortness of breath.    Cardiovascular:  Negative for chest pain and leg swelling.   Gastrointestinal:  Negative for diarrhea and nausea.   Endocrine: Negative for polydipsia and polyuria. "   Genitourinary:  Negative for dysuria and hematuria.   Musculoskeletal:  Negative for arthralgias and myalgias.   Skin:  Negative for color change and rash.   Neurological:  Negative for weakness and numbness.   Psychiatric/Behavioral:  Negative for agitation and confusion.    Objective:     Vital Signs (Most Recent):  Temp: 97.4 °F (36.3 °C) (03/26/23 0747)  Pulse: 65 (03/26/23 0747)  Resp: 16 (03/26/23 0747)  BP: (!) 100/52 (03/26/23 0747)  SpO2: 96 % (03/26/23 0747)   Vital Signs (24h Range):  Temp:  [96.2 °F (35.7 °C)-98 °F (36.7 °C)] 97.4 °F (36.3 °C)  Pulse:  [64-66] 65  Resp:  [16-20] 16  SpO2:  [93 %-98 %] 96 %  BP: ()/(45-66) 100/52     Weight:  (Pt refused nurse was notified.)  Body mass index is 18.39 kg/m².    Intake/Output Summary (Last 24 hours) at 3/26/2023 0902  Last data filed at 3/25/2023 1400  Gross per 24 hour   Intake 222 ml   Output 300 ml   Net -78 ml        Physical Exam  Constitutional:       General: She is not in acute distress.     Appearance: Normal appearance.   HENT:      Head: Normocephalic and atraumatic.      Mouth/Throat:      Mouth: Mucous membranes are moist.      Pharynx: Oropharynx is clear.   Eyes:      Conjunctiva/sclera: Conjunctivae normal.      Pupils: Pupils are equal, round, and reactive to light.   Cardiovascular:      Rate and Rhythm: Normal rate and regular rhythm.      Pulses: Normal pulses.      Heart sounds: Normal heart sounds.      Comments: JVP about 6cm above sternal notch. improving  Pulmonary:      Effort: Pulmonary effort is normal.      Breath sounds: Rales (bibasilar) present.   Abdominal:      General: Abdomen is flat.      Palpations: Abdomen is soft.   Genitourinary:     Comments: Minimal urine output  Musculoskeletal:         General: No swelling or deformity.      Cervical back: Normal range of motion and neck supple. No tenderness.   Skin:     General: Skin is warm and dry.   Neurological:      General: No focal deficit present.      Mental  Status: She is alert and oriented to person, place, and time.   Psychiatric:         Mood and Affect: Mood normal.         Behavior: Behavior normal.       Significant Labs: All pertinent labs within the past 24 hours have been reviewed.    Significant Imaging: I have reviewed all pertinent imaging results/findings within the past 24 hours.      Assessment/Plan:      * NSTEMI (non-ST elevated myocardial infarction)  Presented with chest pain that is reproducible with palpation.   EKG showed: paced rhythm, no ischemic changes  Trop up to 21, stopped trend. BNP 1022  MAR 3, [address] 132  Renal fxn: OFELIA  GOC: pursue medical management    Plan:  - ACS protocol with ASA, Plavix, Heparin 48 hours. DNR, no cath. Medical mgmt  - TTE with reduced EF 35%  - Statin: intolerance. Held Zetia due to liver dysfunction  - B-blocker: coreg  - ACEi/ARB: held lisinopril due to ofelia  - NTG/repeat EKG PRN for chest pain    HFrEF (heart failure with reduced ejection fraction)  EF 35% in the setting of CAD, NSTEMI, likely secondary to ischemic cardiomyopathy.  Cardiology consulted. Medical mgmt of NSTEMI as above.   Trending OFELIA, likely CRS. Hepatic function labs improving, likely congestive hepatopathy.  - Coreg 6.25mg BID, goal HR 60, lowered to 3.125 due to hypotension  - was on ACE. Held due to OFELIA. Will start entresto when OFELIA resolves. Needs further HTN control  - IV diuresis for now. Volume up on exam. Trending renal fxn. Depending on living situation on discharge will either write prn lasix or maybe 2-3 times weekly  - Outpatient cards f/u on d/c    Transaminitis  RUQ US with doppler negative  Clinically without evidence of inflammatory conditions involving the gallbladder or liver  Improving with diuresis, likely congestive      OFELIA (acute kidney injury)  Creatinine 1.8 on admit, baseline around 1.2  - Pre-renal with low PO intake versus cardiorenal syndrome in the setting of new ischemic cardiomyopathy/volume overload  Lab Results    Component Value Date    CREATININE 2.5 (H) 03/26/2023       Plan:   - Strict I&Os and daily weights   - Daily BMP  - Trend sCr  - Avoid nephrotoxic agents such as NSAIDs, gadolinium, ACEi, ARBs and IV radiocontrast.  - Renally dose meds to current GFR.  - Maintain MAP > 65.  - No indications for HD at this time.   - Maintain electrolytes at goal: Mg > 2, Phos > 3, K > 4.  Creatinine continues to worsen will get urine osm, urine na, and serum osm monitoring urine output, pointed more towards pre-renal, will hold lasix on 3/26    Primary hypertension  Takes lisinopril at home. Held due to OFELIA. Home Bps normal. Hypertensive in ED.     - started on amlodipine and coreg (some hypotension, reduced to 3.125)  - uptitration of coreg and anti-HTNs in setting of NSTEMI, HFrEF. Addition of entresto when able    Gastrointestinal hemorrhage with melena  Hx of gastric ulcer possibly 2/2 to NSAID use. Denies current GIB      Statin intolerance  Held off initiating statin as part of ACS protocol    - close to goal, held zetia in setting of transaminase elevation    Glaucoma  Resume home drops      Presence of stent in coronary artery  S/p stent around 2005      Anxiety  Denies taking sertraline and escitalopram listed on her home med printout sheet. Only takes xanax 0.25 prn at night to sleep. Has been out of that prescription for a month.     - low risk of benzo withdrawal  - xanax 0.25 prn      Pacemaker  noted      Hypothyroidism  Continue home synthroid        VTE Risk Mitigation (From admission, onward)         Ordered     IP VTE HIGH RISK PATIENT  Once         03/22/23 2259     Place sequential compression device  Until discontinued         03/22/23 2259     Place sequential compression device  Until discontinued         03/22/23 2140     Reason for no Mechanical VTE Prophylaxis  Once        Question:  Reasons:  Answer:  Physician Provided (leave comment)  Comment:  on heparin drip    03/22/23 2140                Discharge  Planning   WADE: 3/27/2023     Code Status: DNR   Is the patient medically ready for discharge?:     Reason for patient still in hospital (select all that apply): Patient trending condition  Discharge Plan A: Home with family, Home Health                  Misael Weber DO  Department of Hospital Medicine   [facility] Step[facility]

## 2023-03-26 NOTE — SUBJECTIVE & OBJECTIVE
Interval History: Patient doing well on ACS protocol and without chest pain. She has been up walking around with walk assist. OFELIA continues to worsen. Holding lasix    Review of Systems   Constitutional:  Negative for appetite change and fever.   HENT:  Negative for congestion and sinus pain.    Eyes:  Negative for discharge and redness.   Respiratory:  Negative for cough and shortness of breath.    Cardiovascular:  Negative for chest pain and leg swelling.   Gastrointestinal:  Negative for diarrhea and nausea.   Endocrine: Negative for polydipsia and polyuria.   Genitourinary:  Negative for dysuria and hematuria.   Musculoskeletal:  Negative for arthralgias and myalgias.   Skin:  Negative for color change and rash.   Neurological:  Negative for weakness and numbness.   Psychiatric/Behavioral:  Negative for agitation and confusion.    Objective:     Vital Signs (Most Recent):  Temp: 97.4 °F (36.3 °C) (03/26/23 0747)  Pulse: 65 (03/26/23 0747)  Resp: 16 (03/26/23 0747)  BP: (!) 100/52 (03/26/23 0747)  SpO2: 96 % (03/26/23 0747)   Vital Signs (24h Range):  Temp:  [96.2 °F (35.7 °C)-98 °F (36.7 °C)] 97.4 °F (36.3 °C)  Pulse:  [64-66] 65  Resp:  [16-20] 16  SpO2:  [93 %-98 %] 96 %  BP: ()/(45-66) 100/52     Weight:  (Pt refused nurse was notified.)  Body mass index is 18.39 kg/m².    Intake/Output Summary (Last 24 hours) at 3/26/2023 0902  Last data filed at 3/25/2023 1400  Gross per 24 hour   Intake 222 ml   Output 300 ml   Net -78 ml        Physical Exam  Constitutional:       General: She is not in acute distress.     Appearance: Normal appearance.   HENT:      Head: Normocephalic and atraumatic.      Mouth/Throat:      Mouth: Mucous membranes are moist.      Pharynx: Oropharynx is clear.   Eyes:      Conjunctiva/sclera: Conjunctivae normal.      Pupils: Pupils are equal, round, and reactive to light.   Cardiovascular:      Rate and Rhythm: Normal rate and regular rhythm.      Pulses: Normal pulses.      Heart  sounds: Normal heart sounds.      Comments: JVP about 6cm above sternal notch. improving  Pulmonary:      Effort: Pulmonary effort is normal.      Breath sounds: Rales (bibasilar) present.   Abdominal:      General: Abdomen is flat.      Palpations: Abdomen is soft.   Genitourinary:     Comments: Minimal urine output  Musculoskeletal:         General: No swelling or deformity.      Cervical back: Normal range of motion and neck supple. No tenderness.   Skin:     General: Skin is warm and dry.   Neurological:      General: No focal deficit present.      Mental Status: She is alert and oriented to person, place, and time.   Psychiatric:         Mood and Affect: Mood normal.         Behavior: Behavior normal.       Significant Labs: All pertinent labs within the past 24 hours have been reviewed.    Significant Imaging: I have reviewed all pertinent imaging results/findings within the past 24 hours.

## 2023-03-26 NOTE — ASSESSMENT & PLAN NOTE
Creatinine 1.8 on admit, baseline around 1.2  - Pre-renal with low PO intake versus cardiorenal syndrome in the setting of new ischemic cardiomyopathy/volume overload  Lab Results   Component Value Date    CREATININE 2.5 (H) 03/26/2023       Plan:   - Strict I&Os and daily weights   - Daily BMP  - Trend sCr  - Avoid nephrotoxic agents such as NSAIDs, gadolinium, ACEi, ARBs and IV radiocontrast.  - Renally dose meds to current GFR.  - Maintain MAP > 65.  - No indications for HD at this time.   - Maintain electrolytes at goal: Mg > 2, Phos > 3, K > 4.  Creatinine continues to worsen will get urine osm, urine na, and serum osm monitoring urine output, pointed more towards pre-renal, will hold lasix on 3/26

## 2023-03-27 VITALS
WEIGHT: 101.88 LBS | DIASTOLIC BLOOD PRESSURE: 58 MMHG | HEART RATE: 65 BPM | HEIGHT: 62 IN | OXYGEN SATURATION: 96 % | BODY MASS INDEX: 18.75 KG/M2 | SYSTOLIC BLOOD PRESSURE: 122 MMHG | RESPIRATION RATE: 20 BRPM | TEMPERATURE: 97 F

## 2023-03-27 PROBLEM — I21.4 NSTEMI (NON-ST ELEVATED MYOCARDIAL INFARCTION): Status: RESOLVED | Noted: 2023-03-23 | Resolved: 2023-03-27

## 2023-03-27 PROBLEM — K92.1 GASTROINTESTINAL HEMORRHAGE WITH MELENA: Chronic | Status: RESOLVED | Noted: 2021-05-29 | Resolved: 2023-03-27

## 2023-03-27 LAB
ALBUMIN SERPL BCP-MCNC: 2.6 G/DL (ref 3.5–5.2)
ALP SERPL-CCNC: 84 U/L (ref 55–135)
ALT SERPL W/O P-5'-P-CCNC: 77 U/L (ref 10–44)
ANION GAP SERPL CALC-SCNC: 10 MMOL/L (ref 8–16)
AST SERPL-CCNC: 69 U/L (ref 10–40)
BASOPHILS # BLD AUTO: 0.02 K/UL (ref 0–0.2)
BASOPHILS NFR BLD: 0.4 % (ref 0–1.9)
BILIRUB SERPL-MCNC: 0.2 MG/DL (ref 0.1–1)
BUN SERPL-MCNC: 59 MG/DL (ref 10–30)
CALCIUM SERPL-MCNC: 8.6 MG/DL (ref 8.7–10.5)
CHLORIDE SERPL-SCNC: 96 MMOL/L (ref 95–110)
CO2 SERPL-SCNC: 23 MMOL/L (ref 23–29)
CREAT SERPL-MCNC: 2.4 MG/DL (ref 0.5–1.4)
DIFFERENTIAL METHOD: ABNORMAL
EOSINOPHIL # BLD AUTO: 0 K/UL (ref 0–0.5)
EOSINOPHIL NFR BLD: 0.4 % (ref 0–8)
ERYTHROCYTE [DISTWIDTH] IN BLOOD BY AUTOMATED COUNT: 13.9 % (ref 11.5–14.5)
EST. GFR  (NO RACE VARIABLE): 17.7 ML/MIN/1.73 M^2
GLUCOSE SERPL-MCNC: 107 MG/DL (ref 70–110)
HCT VFR BLD AUTO: 30 % (ref 37–48.5)
HGB BLD-MCNC: 9.8 G/DL (ref 12–16)
IMM GRANULOCYTES # BLD AUTO: 0.01 K/UL (ref 0–0.04)
IMM GRANULOCYTES NFR BLD AUTO: 0.2 % (ref 0–0.5)
LYMPHOCYTES # BLD AUTO: 1.3 K/UL (ref 1–4.8)
LYMPHOCYTES NFR BLD: 24.6 % (ref 18–48)
MAGNESIUM SERPL-MCNC: 2.1 MG/DL (ref 1.6–2.6)
MCH RBC QN AUTO: 32.6 PG (ref 27–31)
MCHC RBC AUTO-ENTMCNC: 32.7 G/DL (ref 32–36)
MCV RBC AUTO: 100 FL (ref 82–98)
MONOCYTES # BLD AUTO: 0.3 K/UL (ref 0.3–1)
MONOCYTES NFR BLD: 6.4 % (ref 4–15)
NEUTROPHILS # BLD AUTO: 3.5 K/UL (ref 1.8–7.7)
NEUTROPHILS NFR BLD: 68 % (ref 38–73)
NRBC BLD-RTO: 0 /100 WBC
PHOSPHATE SERPL-MCNC: 4.6 MG/DL (ref 2.7–4.5)
PLATELET # BLD AUTO: 189 K/UL (ref 150–450)
PMV BLD AUTO: 12.3 FL (ref 9.2–12.9)
POTASSIUM SERPL-SCNC: 4.2 MMOL/L (ref 3.5–5.1)
PROT SERPL-MCNC: 7.6 G/DL (ref 6–8.4)
RBC # BLD AUTO: 3.01 M/UL (ref 4–5.4)
SODIUM SERPL-SCNC: 129 MMOL/L (ref 136–145)
WBC # BLD AUTO: 5.12 K/UL (ref 3.9–12.7)

## 2023-03-27 PROCEDURE — 25000003 PHARM REV CODE 250: Mod: HCNC

## 2023-03-27 PROCEDURE — 93005 ELECTROCARDIOGRAM TRACING: CPT | Mod: HCNC

## 2023-03-27 PROCEDURE — 83735 ASSAY OF MAGNESIUM: CPT | Mod: HCNC

## 2023-03-27 PROCEDURE — 93010 ELECTROCARDIOGRAM REPORT: CPT | Mod: HCNC,,, | Performed by: INTERNAL MEDICINE

## 2023-03-27 PROCEDURE — 94761 N-INVAS EAR/PLS OXIMETRY MLT: CPT | Mod: HCNC

## 2023-03-27 PROCEDURE — 84100 ASSAY OF PHOSPHORUS: CPT | Mod: HCNC

## 2023-03-27 PROCEDURE — 93010 EKG 12-LEAD: ICD-10-PCS | Mod: HCNC,,, | Performed by: INTERNAL MEDICINE

## 2023-03-27 PROCEDURE — 1111F DSCHRG MED/CURRENT MED MERGE: CPT | Mod: HCNC,CPTII,GC, | Performed by: STUDENT IN AN ORGANIZED HEALTH CARE EDUCATION/TRAINING PROGRAM

## 2023-03-27 PROCEDURE — 99239 PR HOSPITAL DISCHARGE DAY,>30 MIN: ICD-10-PCS | Mod: HCNC,GC,, | Performed by: STUDENT IN AN ORGANIZED HEALTH CARE EDUCATION/TRAINING PROGRAM

## 2023-03-27 PROCEDURE — 99239 HOSP IP/OBS DSCHRG MGMT >30: CPT | Mod: HCNC,GC,, | Performed by: STUDENT IN AN ORGANIZED HEALTH CARE EDUCATION/TRAINING PROGRAM

## 2023-03-27 PROCEDURE — 80053 COMPREHEN METABOLIC PANEL: CPT | Mod: HCNC

## 2023-03-27 PROCEDURE — 1111F PR DISCHARGE MEDS RECONCILED W/ CURRENT OUTPATIENT MED LIST: ICD-10-PCS | Mod: HCNC,CPTII,GC, | Performed by: STUDENT IN AN ORGANIZED HEALTH CARE EDUCATION/TRAINING PROGRAM

## 2023-03-27 PROCEDURE — 97535 SELF CARE MNGMENT TRAINING: CPT | Mod: HCNC

## 2023-03-27 PROCEDURE — 85025 COMPLETE CBC W/AUTO DIFF WBC: CPT | Mod: HCNC | Performed by: EMERGENCY MEDICINE

## 2023-03-27 PROCEDURE — 36415 COLL VENOUS BLD VENIPUNCTURE: CPT | Mod: HCNC

## 2023-03-27 RX ORDER — DICLOFENAC SODIUM 10 MG/G
2 GEL TOPICAL DAILY PRN
Start: 2023-03-27

## 2023-03-27 RX ORDER — FUROSEMIDE 20 MG/1
20 TABLET ORAL DAILY PRN
Qty: 30 TABLET | Refills: 11 | Status: SHIPPED | OUTPATIENT
Start: 2023-03-27 | End: 2024-03-26

## 2023-03-27 RX ORDER — GABAPENTIN 100 MG/1
100 CAPSULE ORAL 2 TIMES DAILY PRN
Start: 2023-03-27

## 2023-03-27 RX ORDER — HEPARIN SODIUM 5000 [USP'U]/ML
5000 INJECTION, SOLUTION INTRAVENOUS; SUBCUTANEOUS EVERY 8 HOURS
Status: DISCONTINUED | OUTPATIENT
Start: 2023-03-27 | End: 2023-03-27 | Stop reason: HOSPADM

## 2023-03-27 RX ORDER — CLOPIDOGREL BISULFATE 75 MG/1
75 TABLET ORAL DAILY
Qty: 90 TABLET | Refills: 3 | Status: SHIPPED | OUTPATIENT
Start: 2023-03-27

## 2023-03-27 RX ORDER — LISINOPRIL 20 MG/1
20 TABLET ORAL DAILY
Qty: 90 TABLET | Refills: 3 | Status: SHIPPED | OUTPATIENT
Start: 2023-03-27

## 2023-03-27 RX ORDER — CARVEDILOL 3.12 MG/1
3.12 TABLET ORAL 2 TIMES DAILY
Qty: 180 TABLET | Refills: 3 | Status: SHIPPED | OUTPATIENT
Start: 2023-03-27 | End: 2024-03-26

## 2023-03-27 RX ORDER — NAPROXEN SODIUM 220 MG/1
81 TABLET, FILM COATED ORAL DAILY
Qty: 90 TABLET | Refills: 3 | Status: SHIPPED | OUTPATIENT
Start: 2023-03-27 | End: 2024-03-26

## 2023-03-27 RX ORDER — ALPRAZOLAM 0.25 MG/1
0.25 TABLET ORAL DAILY PRN
Start: 2023-03-27 | End: 2023-04-05 | Stop reason: SDUPTHER

## 2023-03-27 RX ADMIN — DORZOLAMIDE HYDROCHLORIDE 1 DROP: 20 SOLUTION/ DROPS OPHTHALMIC at 10:03

## 2023-03-27 RX ADMIN — LEVOTHYROXINE SODIUM 50 MCG: 50 TABLET ORAL at 05:03

## 2023-03-27 RX ADMIN — PANTOPRAZOLE SODIUM 40 MG: 40 TABLET, DELAYED RELEASE ORAL at 08:03

## 2023-03-27 RX ADMIN — ASPIRIN 81 MG 81 MG: 81 TABLET ORAL at 08:03

## 2023-03-27 RX ADMIN — CARVEDILOL 3.12 MG: 3.12 TABLET, FILM COATED ORAL at 08:03

## 2023-03-27 RX ADMIN — BRIMONIDINE TARTRATE 1 DROP: 1.5 SOLUTION/ DROPS OPHTHALMIC at 10:03

## 2023-03-27 RX ADMIN — CLOPIDOGREL BISULFATE 75 MG: 75 TABLET ORAL at 08:03

## 2023-03-27 RX ADMIN — LIDOCAINE 1 PATCH: 50 PATCH CUTANEOUS at 08:03

## 2023-03-27 NOTE — PLAN OF CARE
[facility] - Cardiology Stepdown      HOME HEALTH ORDERS  FACE TO FACE ENCOUNTER    Patient Name: Cat Bolivar  YOB: 1923    PCP: Primary Doctor No   PCP Address: None  PCP Phone Number: None  PCP Fax: None    Encounter Date: 3/22/23    Admit to Home Health    Diagnoses:  Active Hospital Problems    Diagnosis  POA    HFrEF (heart failure with reduced ejection fraction) [I50.20]  Yes    OFELIA (acute kidney injury) [N17.9]  Yes    Transaminitis [R74.01]  Yes    Primary hypertension [I10]  Yes     Chronic    Statin intolerance [Z78.9]  Yes     Chronic    Presence of stent in coronary artery [Z95.5]  Not Applicable     Chronic    Glaucoma [H40.9]  Yes     Chronic    Anxiety [F41.9]  Yes    Pacemaker [Z95.0]  Yes     Chronic    Hypothyroidism [E03.9]  Yes     Chronic      Resolved Hospital Problems    Diagnosis Date Resolved POA    *NSTEMI (non-ST elevated myocardial infarction) [I21.4] 03/27/2023 Yes    Elevated brain natriuretic peptide (BNP) level [R79.89] 03/24/2023 Yes    Gastrointestinal hemorrhage with melena [K92.1] 03/27/2023 Yes     Chronic       Follow Up Appointments:  Future Appointments   Date Time Provider Department Center   4/14/2023  9:00 AM Brynn Villarreal MD [facility] MED CLN [facility]   4/26/2023  2:15 PM Stiven Ellis MD [facility] [place]   5/11/2023 10:00 AM HOME MONITOR DEVICE CHECK, [facility]   5/24/2023  1:30 PM Ricardo Veliz Jr., MD [facility] [place]       Allergies:  Review of patient's allergies indicates:   Allergen Reactions    Citalopram      tachycardia    Codeine Other (See Comments)     Dizziness    Cytotec [misoprostol] Other (See Comments)     Unknown      Feldene [piroxicam] Other (See Comments)     unknown    Gentamicin Other (See Comments)     Flushing      Indocin [indomethacin sodium] Other (See Comments)     Unknown      Motrin [ibuprofen] Nausea And Vomiting    Paxil [paroxetine hcl] Other (See Comments)     Suicidal feelings     Prozac [fluoxetine] Other (See Comments)     Suicidal feeling    Tofranil [imipramine hcl] Other (See Comments)     Feeling restless    Vancomycin analogues Other (See Comments)     Unknown      Elavil [amitriptyline] Palpitations    Penicillins Rash     Other reaction(s): RASH       Medications: Review discharge medications with patient and family and provide education.    Current Facility-Administered Medications   Medication Dose Route Frequency Provider Last Rate Last Admin    acetaminophen tablet 650 mg  650 mg Oral Q6H PRN Lan Middleton MD        aspirin chewable tablet 81 mg  81 mg Oral Daily Lan Middleton MD   81 mg at 03/27/23 0852    brimonidine 0.15 % OPTH DROP ophthalmic solution 1 drop  1 drop Both Eyes BID Lan Middleton MD   1 drop at 03/27/23 1022    carvediloL tablet 3.125 mg  3.125 mg Oral BID Misael Weber DO   3.125 mg at 03/27/23 0853    clopidogreL tablet 75 mg  75 mg Oral Daily Lan Middleton MD   75 mg at 03/27/23 0853    diclofenac sodium 1 % gel 2 g  2 g Topical (Top) BID PRN Lan Middleton MD        dorzolamide 2 % ophthalmic solution 1 drop  1 drop Both Eyes BID Lan Middleton MD   1 drop at 03/27/23 1022    heparin (porcine) injection 5,000 Units  5,000 Units Subcutaneous Q8H Boone Waller MD        latanoprost 0.005 % ophthalmic solution 1 drop  1 drop Both Eyes Daily PM Lan Middleton MD   1 drop at 03/26/23 2000    levothyroxine tablet 50 mcg  50 mcg Oral Before breakfast Lan Middleton MD   50 mcg at 03/27/23 0527    LIDOcaine 5 % patch 1 patch  1 patch Transdermal Daily Lan Middleton MD   1 patch at 03/27/23 0853    melatonin tablet 6 mg  6 mg Oral Nightly PRN Jaquan Zacarias III, MD   6 mg at 03/26/23 2029    nitroGLYCERIN SL tablet 0.4 mg  0.4 mg Sublingual Q5 Min PRN Lan Middleton MD        pantoprazole EC tablet 40 mg  40 mg Oral Daily Lan Middleton MD   40 mg at 03/27/23 0853    sodium chloride 0.9% flush 10 mL  10 mL Intravenous PRN Jaquan Zacarias III, MD         Current Discharge  Medication List        START taking these medications    Details   aspirin 81 MG Chew Take 1 tablet (81 mg total) by mouth once daily.  Qty: 90 tablet, Refills: 3      carvediloL (COREG) 3.125 MG tablet Take 1 tablet (3.125 mg total) by mouth 2 (two) times daily.  Qty: 180 tablet, Refills: 3    Comments: .           CONTINUE these medications which have CHANGED    Details   ALPRAZolam (XANAX) 0.25 MG tablet Take 1 tablet (0.25 mg total) by mouth daily as needed for Anxiety or Insomnia.    Associated Diagnoses: Anxiety      clopidogreL (PLAVIX) 75 mg tablet Take 1 tablet (75 mg total) by mouth once daily.  Qty: 90 tablet, Refills: 3      diclofenac sodium (VOLTAREN) 1 % Gel Apply 2 g topically daily as needed (Pain).    Associated Diagnoses: Back pain, unspecified back location, unspecified back pain laterality, unspecified chronicity      gabapentin (NEURONTIN) 100 MG capsule Take 1 capsule (100 mg total) by mouth 2 (two) times daily as needed (Pain).           CONTINUE these medications which have NOT CHANGED    Details   BIOTIN ORAL Take 1,000 mg by mouth once daily.      coenzyme Q10 10 mg capsule Take 10 mg by mouth once daily.      dorzolamide (TRUSOPT) 2 % ophthalmic solution Place 1 drop into both eyes 3 (three) times daily.  Qty: 30 mL, Refills: 3    Associated Diagnoses: Primary open-angle glaucoma(365.11)      ergocalciferol, vitamin D2, (VITAMIN D ORAL) Take 50 mcg by mouth once daily.      ezetimibe (ZETIA) 10 mg tablet TAKE 1 TABLET (10 MG TOTAL) BY MOUTH EVERY EVENING.  Qty: 90 tablet, Refills: 3      fexofenadine (ALLEGRA) 60 MG tablet Take 60 mg by mouth daily as needed (Allergies).      latanoprost 0.005 % ophthalmic solution INSTILL 1 DROP INTO BOTH EYES EVERY EVENING.   Qty: 3 Bottle, Refills: 3    Associated Diagnoses: Primary open angle glaucoma of both eyes, severe stage      levothyroxine (SYNTHROID) 50 MCG tablet TAKE ONE TABLET BY MOUTH EVERY DAY BEFORE BREAKFAST  Qty: 90 tablet, Refills: 3     Associated Diagnoses: Acquired hypothyroidism      LIDOcaine (LIDODERM) 5 % Place 1 patch onto the skin once daily. Remove & Discard patch within 12 hours or as directed by MD  Qty: 30 patch, Refills: 0      lisinopriL (PRINIVIL,ZESTRIL) 20 MG tablet TAKE 1 TABLET EVERY DAY (HOLD UNTIL SEEN BY PCP)  Qty: 90 tablet, Refills: 3      pantoprazole (PROTONIX) 40 MG tablet Take 1 tablet (40 mg total) by mouth once daily.  Qty: 90 tablet, Refills: 2           STOP taking these medications       brimonidine 0.15 % OPTH DROP (ALPHAGAN) 0.15 % ophthalmic solution Comments:   Reason for Stopping:                 I have seen and examined this patient within the last 30 days. My clinical findings that support the need for the home health skilled services and home bound status are the following:no   Weakness/numbness causing balance and gait disturbance due to Weakness/Debility making it taxing to leave home.     Diet:   cardiac diet    Labs:  SN to perform labs:  BMP: Weekly; 1 week(s)    Referrals/ Consults  Physical Therapy to evaluate and treat. Evaluate for home safety and equipment needs; Establish/upgrade home exercise program. Perform / instruct on therapeutic exercises, gait training, transfer training, and Range of Motion.  Occupational Therapy to evaluate and treat. Evaluate home environment for safety and equipment needs. Perform/Instruct on transfers, ADL training, ROM, and therapeutic exercises.   to evaluate for community resources/long-range planning.  Aide to provide assistance with personal care, ADLs, and vital signs.    Activities:   activity as tolerated    Nursing:   Agency to admit patient within 24 hours of hospital discharge unless specified on physician order or at patient request    SN to complete comprehensive assessment including routine vital signs. Instruct on disease process and s/s of complications to report to MD. Review/verify medication list sent home with the patient at time of  discharge  and instruct patient/caregiver as needed. Frequency may be adjusted depending on start of care date.     Skilled nurse to perform up to 3 visits PRN for symptoms related to diagnosis    Notify MD if SBP > 160 or < 90; DBP > 90 or < 50; HR > 120 or < 50; Temp > 101; O2 < 88%    Ok to schedule additional visits based on staff availability and patient request on consecutive days within the home health episode.    When multiple disciplines ordered:    Start of Care occurs on Sunday - Wednesday schedule remaining discipline evaluations as ordered on separate consecutive days following the start of care.    Thursday SOC -schedule subsequent evaluations Friday and Monday the following week.     Friday - Saturday SOC - schedule subsequent discipline evaluations on consecutive days starting Monday of the following week.  Miscellaneous   Heart Failure:      SN to instruct on the following:    Instruct on the definition of CHF.   Instruct on the signs/sympoms of CHF to be reported.   Instruct on and monitor daily weights.   Instruct on factors that cause exacerbation.   Instruct on action, dose, schedule, and side effects of medications.   Instruct on diet as prescribed.   Instruct on activity allowed.   Instruct on life-style modifications for life long management of CHF   SN to assess compliance with daily weights, diet, medications, fluid retention,    safety precautions, activities permitted and life-style modifications.   Additional 1-2 SN visits per week as needed for signs and symptoms     of CHF exacerbation.      For Weight Gain > 2-3 lbs in 1 day or 4-6 lbs over 1 week notify PCP:  CHF DIURETIC SLIDING SCALE     Skilled nurse visits daily to instruct and monitor medication adherence until target weight. Then resume prior order frequency.     If weight gain exceeds 5 lbs over target weight, call MD  If weight gain of 3-4 lbs over target weight, then start lasix 20 mg daily until back to baseline  weight.    Home Health Aide:  Nursing Three times weekly, Physical Therapy Three times weekly, Occupational Therapy Three times weekly, and Medical Social Work Three times weekly    Wound Care Orders  no    I certify that this patient is confined to her home and needs intermittent skilled nursing care, physical therapy, and occupational therapy.

## 2023-03-27 NOTE — PLAN OF CARE
03/27/23 1624   Final Note   Assessment Type Final Discharge Note   Anticipated Discharge Disposition Home-Health   What phone number can be called within the next 1-3 days to see how you are doing after discharge? [phone]   Hospital Resources/Appts/Education Provided Provided patient/caregiver with written discharge plan information;Appointments scheduled and added to AVS;Appointments scheduled by Navigator/Coordinator   Post-Acute Status   Home Health Status Set-up Complete/Auth obtained   Discharge Delays None known at this time     Patient to discharge home with [facility] set up. Family member to bring home. Referred to [facility], [facility] and [facility].     Kirsten Chatman RN    [phone]    Future Appointments   Date Time Provider Department Center   4/14/2023  9:00 AM Brynn Villarreal MD [facility] MED [facility] PC[facility]   4/26/2023  2:15 PM Stiven Ellis MD [facility] [place]   5/11/2023 10:00 AM HOME MONITOR DEVICE CHECK, [facility]   5/24/2023  1:30 PM Ricardo Veliz Jr., MD [facility] [place]

## 2023-03-27 NOTE — PT/OT/SLP PROGRESS
Occupational Therapy   Treatment    Name: Cat Bolivar  MRN: [id number]  Admitting Diagnosis:  NSTEMI (non-ST elevated myocardial infarction)       Recommendations:     Discharge Recommendations: home with home health  Discharge Equipment Recommendations:  none  Barriers to discharge:  Decreased caregiver support    Assessment:     Cat Bolivar is a 89 y.o. female with a medical diagnosis of NSTEMI (non-ST elevated myocardial infarction).  She presents with impairments listed below. Pt did well to tolerate and participate in the session. Pt is functioning below baseline, but is making good overall progress towards goals. Pt displayed global deconditioning requiring increased assist for ADLs and mobility at this time. Pt would benefit from skilled OT services to improve independence and overall occupational functioning.     Performance deficits affecting function are weakness, impaired endurance, impaired self care skills, impaired functional mobility, gait instability, impaired balance, decreased lower extremity function.     Rehab Prognosis:  Good; patient would benefit from acute skilled OT services to address these deficits and reach maximum level of function.       Plan:     Patient to be seen 3 x/week to address the above listed problems via self-care/home management, therapeutic activities, therapeutic exercises, neuromuscular re-education  Plan of Care Expires: 04/24/23  Plan of Care Reviewed with: patient    Subjective     Chief Complaint: Fatigue   Patient/Family Comments/goals: Reutrn home  Pain/Comfort:  Pain Rating 1: 0/10  Pain Rating Post-Intervention 1: 0/10    Objective:     Communicated with: RN prior to session.  Patient found sitting edge of bed with peripheral IV, telemetry upon OT entry to room.    General Precautions: Standard, fall    Orthopedic Precautions:N/A  Braces: N/A  Respiratory Status: Room air     Occupational Performance:       Functional Mobility/Transfers:  Patient completed Sit  Pt resting with no complaints  Port accessed, labs drawn, port hep-locked and deaccessed without issue  Declined AVS  Pt aware of next appt  <> Stand Transfer with minimum assistance  with  no assistive device   Patient completed Toilet Transfer Step Transfer technique with minimum assistance with  no AD  Functional Mobility: Pt ambulated ~3 feet x2 w/o AD.     Activities of Daily Living:  Grooming: supervision hand hygiene while seated  Toileting: stand by assistance seated on bsc      AMPAC 6 Click ADL: 20    Treatment & Education:  Pt educated on:    POC & overall coordination of care   Early mobility  Importance of oob activities  Importance of participating in therapy  Possible benefits of therapy      Patient left up in chair with all lines intact and call button in reach    GOALS:   Multidisciplinary Problems       Occupational Therapy Goals          Problem: Occupational Therapy    Goal Priority Disciplines Outcome Interventions   Occupational Therapy Goal     OT, PT/OT Ongoing, Progressing    Description: Goals to be met by: 4/7/23     Patient will increase functional independence with ADLs by performing:    UE Dressing with Supervision.  LE Dressing with Supervision.  Grooming while standing at sink with Supervision.  Toileting from toilet with Supervision for hygiene and clothing management.   Supine to sit with Supervision.  Step transfer with Supervision  Upper extremity exercise program with supervision.                         Time Tracking:     OT Date of Treatment: 03/27/23  OT Start Time: 1110  OT Stop Time: 1123  OT Total Time (min): 13 min    Billable Minutes:Self Care/Home Management 13 minutes    OT/COLLETTE: OT          3/27/2023

## 2023-03-27 NOTE — PLAN OF CARE
03/27/23 1207   Post-Acute Status   Post-Acute Authorization Home Health   Home Health Status Referrals Sent   Discharge Plan   Discharge Plan A Home Health;Home   Discharge Plan B Home     Referrals sent to [facility] and [facility] . Will continue to monitor for discharge needs. Daughter wanted either one of these for her mother.      12:12 Lyssa ([id number]) called from [facility] and stated pertient currently with them since December and will resume care upon discharge.     Kirsten Chatman RN    [phone]

## 2023-03-27 NOTE — DISCHARGE SUMMARY
Elie Jung - Cardiology [facility] Medicine  Discharge Summary      Patient Name: Cat Bolivar  MRN: [id number]  STEPHANIE: [id number]  Patient Class: IP- Inpatient  Admission Date: 3/22/2023  Hospital Length of Stay: 5 days  Discharge Date and Time:  03/27/2023 2:05 PM  Attending Physician: Boone Waller MD   Discharging Provider: Landon Mooney MD  Primary Care Provider: Primary Doctor [facility] Medicine Team: [facility] 1 Landon Mooney MD  Primary Care Team: [facility] 1    HPI:   89 y.o. female with PMHx of fibromyalgia, HTN, CAD s/p stent placement around 2005, HLD, prediabetes, glaucoma, anxiety, hypothyroidism, osteoporosis presenting with acute onset of chest pain that has been going on since last night.  She states she has had pain like this for years and thinks it is secondary to her fibromyalgia. The pain is reproducible when she pushes on her chest.  She denies any shortness of breath, nausea, vomiting, diaphoresis. Pain is located substernally, nonexertional, does not take nitroglycerin at home. Heating pads help. Currently pain free at time of evaluation. She did not wish to come to the emergency department but her family got scared when she was complaining of chest pain and called EMS. Denies tobacco use, but has had some exposure to secondhand smoke from her . Denies orthopnea, PND, leg swelling abdominal swelling, abnormal UOP, excessive salt intake beyond her usual. Reports drinks very little at baseline. Reports medication compliance but stated meds are very different from her personal list and meds listed in chart. Does not take lasix or aldactone (listed on her med list paper in her folder, aldactone 12.5mg and lasix 20mg prn for SBP>150). Denies previous history of heart failure. Home blood pressures are normally in 110-130s systolic. Has been out of her xanax for a month, takes it for sleep and anxiety (takes no SSRIs). At baseline she ambulates well, barely needing her  "walker. Has remote hx of fall, but her balance has been good lately. She reports following with Dr. Veliz. Reportedly takes plavix 3x/week per his instructions, but expresses confusion regarding this. Unclear why she has statin intolerance listed per chart review. She and her daughter were unable to provide specific information about what statin induced reaction she might've had. Reports taking Zetia sometimes. Also unclear what allergy she had to aspirin (listed as "headache" in chart), but patient denies being on aspirin or any specific known reaction to it.     In ED, labs were significant for troponin of 8.141, Cr 1.8, BUN 40, Na 134, BNP 1022, , . BP was elevated to 182/66 in ED, on room air, afebrile, HR normal. She wishes to be DNR and does not want invasive intervention such as cardiac cath. Admitted to [facility] for ACS protocol. Has been starting on ticagrelor and heparin drip in ED.       * No surgery found *      Hospital Course:   Patient admitted to hospital medicine for NSTEMI and treated with medical management based on goals of care conversations. She was also treated for volume overload with IV lasix. She has continued to be chest pain free.  Pt's creatinine increased to above baseline during diuresis. Obtained urine/serum osm, urine sodium, which lead us to believe this is more of a prerenal azotemia and lasix were held with stabilization of renal function. Patient with low-normal BP and thought unlikely to tolerate full GDMT for her HFrEF, so discharged on low dose coreg, holding lisinopril for OFELIA - with close cardiology follow up to assess for initiation of GDMT. Also with follow up labs and PCP visit to assess for resolution of OFELIA.        Goals of Care Treatment Preferences:  Code Status: DNR    Living Will: Yes              Consults:   Consults (From admission, onward)          Status Ordering Provider     Inpatient consult to Cardiology  Once        Provider:  (Not yet " assigned)    Completed GLORIA OWENS     Inpatient consult to Social Work/Case Management  Once        Provider:  (Not yet assigned)    Acknowledged GLORIA OWENS     Inpatient consult to Cardiac Rehab  Once        Provider:  (Not yet assigned)    Acknowledged GLORIA OWENS            No new Assessment & Plan notes have been filed under this hospital service since the last note was generated.  Service: Hospital Medicine    Final Active Diagnoses:    Diagnosis Date Noted POA    HFrEF (heart failure with reduced ejection fraction) [I50.20] 03/24/2023 Yes    OFELIA (acute kidney injury) [N17.9] 03/23/2023 Yes    Transaminitis [R74.01] 03/23/2023 Yes    Primary hypertension [I10] 05/29/2021 Yes     Chronic    Statin intolerance [Z78.9] 02/08/2021 Yes     Chronic    Presence of stent in coronary artery [Z95.5] 02/08/2021 Not Applicable     Chronic    Glaucoma [H40.9] 02/08/2021 Yes     Chronic    Anxiety [F41.9] 01/08/2021 Yes    Pacemaker [Z95.0] 01/08/2021 Yes     Chronic    Hypothyroidism [E03.9] 12/10/2012 Yes     Chronic      Problems Resolved During this Admission:    Diagnosis Date Noted Date Resolved POA    PRINCIPAL PROBLEM:  NSTEMI (non-ST elevated myocardial infarction) [I21.4] 03/23/2023 03/27/2023 Yes    Elevated brain natriuretic peptide (BNP) level [R79.89] 03/23/2023 03/24/2023 Yes    Gastrointestinal hemorrhage with melena [K92.1] 05/29/2021 03/27/2023 Yes     Chronic       Discharged Condition: stable    Disposition:   Home with self-care, has sitters and good social support    Follow Up:   Follow-up Information       [employer] Follow up.    Why: Please refer to this site for any further assistance .             Primary Doctor No. Call in 3 day(s).                           Patient Instructions:      BASIC METABOLIC PANEL   Standing Status: Future Standing Exp. Date: 05/25/24     Ambulatory referral/consult to Outpatient Case Management   Referral Priority: Routine Referral Type: Consultation    Referral Reason: Specialty Services Required   Number of Visits Requested: 1     Ambulatory referral/consult to [facility]   Standing Status: Future   Referral Priority: Routine Referral Type: Consultation   Referral Reason: Specialty Services Required   Number of Visits Requested: 1     Ambulatory referral/consult to Cardiology   Standing Status: Future   Referral Priority: Routine Referral Type: Consultation   Referral Reason: Specialty Services Required   Requested Specialty: Cardiology   Number of Visits Requested: 1     Ambulatory Request for Ready Responder Services   Standing Status: Future Standing Exp. Date: 03/24/24     Order Specific Question Answer Comments   Program referred to: COVID-19 Vaccine        Significant Diagnostic Studies:   TTE:  The left ventricle is normal in size with moderately decreased systolic function.  There are segmental left ventricular wall motion abnormalities.  The estimated ejection fraction is 35%.  There is abnormal septal wall motion.  Indeterminate left ventricular diastolic function.  Normal right ventricular size with normal right ventricular systolic function.  Biatrial enlargement.  Aortic valve area is 1.02 cm2; peak velocity is 1.73 m/s; mean gradient is 6 mmHg. The aortic valve stenosis is probably mild. The continuity equation may not be accurate in the presence of low stroke volume.  There is mild aortic valve stenosis.  Mild aortic regurgitation.  Mild mitral regurgitation.  Moderate tricuspid regurgitation.  The estimated PA systolic pressure is 38 mmHg.  Elevated central venous pressure (15 mmHg).      Pending Diagnostic Studies:       Procedure Component Value Units Date/Time    Basic metabolic panel [[id number]]     Order Status: Sent Lab Status: No result     Specimen: Blood            Medications:  Reconciled Home Medications:      Medication List        START taking these medications      aspirin 81 MG Chew  Take 1 tablet (81 mg total) by  mouth once daily.     carvediloL 3.125 MG tablet  Commonly known as: COREG  Take 1 tablet (3.125 mg total) by mouth 2 (two) times daily.     furosemide 20 MG tablet  Commonly known as: LASIX  Take 1 tablet (20 mg total) by mouth daily as needed (Weight gain > 5 pounds in 24 hours, leg swelling, shortness of breath).            CHANGE how you take these medications      clopidogreL 75 mg tablet  Commonly known as: PLAVIX  Take 1 tablet (75 mg total) by mouth once daily.  What changed: See the new instructions.     lisinopriL 20 MG tablet  Commonly known as: PRINIVIL,ZESTRIL  Take 1 tablet (20 mg total) by mouth once daily. DO NOT TAKE UNTIL FOLLOW UP WITH YOUR PCP  What changed:   when to take this  additional instructions            CONTINUE taking these medications      ALPRAZolam 0.25 MG tablet  Commonly known as: XANAX  Take 1 tablet (0.25 mg total) by mouth daily as needed for Anxiety or Insomnia.     BIOTIN ORAL  Take 1,000 mg by mouth once daily.     coenzyme Q10 10 mg capsule  Take 10 mg by mouth once daily.     diclofenac sodium 1 % Gel  Commonly known as: VOLTAREN  Apply 2 g topically daily as needed (Pain).     ezetimibe 10 mg tablet  Commonly known as: ZETIA  TAKE 1 TABLET (10 MG TOTAL) BY MOUTH EVERY EVENING.     fexofenadine 60 MG tablet  Commonly known as: ALLEGRA  Take 60 mg by mouth daily as needed (Allergies).     gabapentin 100 MG capsule  Commonly known as: NEURONTIN  Take 1 capsule (100 mg total) by mouth 2 (two) times daily as needed (Pain).     latanoprost 0.005 % ophthalmic solution  INSTILL 1 DROP INTO BOTH EYES EVERY EVENING.     levothyroxine 50 MCG tablet  Commonly known as: SYNTHROID  TAKE ONE TABLET BY MOUTH EVERY DAY BEFORE BREAKFAST     pantoprazole 40 MG tablet  Commonly known as: PROTONIX  Take 1 tablet (40 mg total) by mouth once daily.     VITAMIN D ORAL  Take 50 mcg by mouth once daily.            ASK your doctor about these medications      dorzolamide 2 % ophthalmic  solution  Commonly known as: TRUSOPT  Place 1 drop into both eyes 3 (three) times daily.     LIDOcaine 5 %  Commonly known as: LIDODERM  Place 1 patch onto the skin once daily. Remove & Discard patch within 12 hours or as directed by MD              Indwelling Lines/Drains at time of discharge:   Lines/Drains/Airways       None                   Time spent on the discharge of patient: 35 minutes         Landon Mooney MD  Department of Hospital Medicine  [facility] - Cardiology Stepdown

## 2023-03-28 ENCOUNTER — PATIENT OUTREACH (OUTPATIENT)
Dept: ADMINISTRATIVE | Facility: CLINIC | Age: 88
End: 2023-03-28
Payer: MEDICARE

## 2023-03-28 ENCOUNTER — NURSE TRIAGE (OUTPATIENT)
Dept: ADMINISTRATIVE | Facility: CLINIC | Age: 88
End: 2023-03-28
Payer: MEDICARE

## 2023-03-28 NOTE — PROGRESS NOTES
C3 nurse spoke with Cat Bolivar's POA/Caregiver Lyssa for a TCC post hospital discharge follow up call. The patient has a scheduled HOSFU appointment with Greer Gautam on 04/05/23 @ 1400. .

## 2023-03-28 NOTE — PROGRESS NOTES
C3 nurse attempted to contact Cat Bolivar  for a TCC post hospital discharge follow up call. No answer. Left voicemail with callback information. The patient has a scheduled HOSFU appointment with Greer Gautam on 04/05/23 @ 1400.

## 2023-03-28 NOTE — TELEPHONE ENCOUNTER
Pt responded to the post discharge tracker asking for a nurse to call her. Triage nurse call pt stated she did not call the nurse. Explained to pt that I called her. Pt stated she didn't know what was going on. She is mixed up and hung up. Called pt emergency contact Lyssa Peguero. Pt was discharged yesterday. Pt is home and now confused with complaints of back pain, weak legs. Cg wants to have someone there  all the time. Cg is upset that she cant see pcp within 2 weeks but she can was given an appt with NP next week. Cg stated she had to go as well and hung up the phone.   Reason for Disposition   Caller hangs up    Protocols used: Difficult Caller-A-AH

## 2023-03-29 ENCOUNTER — PATIENT MESSAGE (OUTPATIENT)
Dept: PRIMARY CARE CLINIC | Facility: CLINIC | Age: 88
End: 2023-03-29
Payer: MEDICARE

## 2023-03-29 ENCOUNTER — TELEPHONE (OUTPATIENT)
Dept: PRIMARY CARE CLINIC | Facility: CLINIC | Age: 88
End: 2023-03-29
Payer: MEDICARE

## 2023-03-29 ENCOUNTER — PATIENT MESSAGE (OUTPATIENT)
Dept: CARDIOLOGY | Facility: CLINIC | Age: 88
End: 2023-03-29
Payer: MEDICARE

## 2023-03-29 NOTE — TELEPHONE ENCOUNTER
Please see whether patient understands that she is already established with a PCP. Dr Mcclain was who she saw before, and she has an appt with him coming up.    Thanks,  Martin

## 2023-03-30 ENCOUNTER — TELEPHONE (OUTPATIENT)
Dept: CARDIOLOGY | Facility: CLINIC | Age: 88
End: 2023-03-30
Payer: MEDICARE

## 2023-03-30 ENCOUNTER — PATIENT MESSAGE (OUTPATIENT)
Dept: PRIMARY CARE CLINIC | Facility: CLINIC | Age: 88
End: 2023-03-30
Payer: MEDICARE

## 2023-03-30 ENCOUNTER — PES CALL (OUTPATIENT)
Dept: ADMINISTRATIVE | Facility: CLINIC | Age: 88
End: 2023-03-30
Payer: MEDICARE

## 2023-04-01 ENCOUNTER — TELEPHONE (OUTPATIENT)
Dept: ADMINISTRATIVE | Facility: CLINIC | Age: 88
End: 2023-04-01
Payer: MEDICARE

## 2023-04-01 NOTE — PROGRESS NOTES
Returned daughter called from [facility] , daughter states she have placed several calls for assistance to have someone sit with her mother. Informed daughter that we do not provide sitter assistance, she can reach out to [employer] / Medicare insurance and request a Long Term Care application to be completed . If approved by her insurance will provide extended assistance in home. Daughter verbalize understanding and do not have any other questions

## 2023-04-03 ENCOUNTER — PATIENT MESSAGE (OUTPATIENT)
Dept: PRIMARY CARE CLINIC | Facility: CLINIC | Age: 88
End: 2023-04-03
Payer: MEDICARE

## 2023-04-03 NOTE — PHYSICIAN QUERY
PT Name: Cat Bolivar  MR #: [id number]     DOCUMENTATION CLARIFICATION     CDS/: Criss Pereira RN               Contact information: [email or web address]  This form is a permanent document in the medical record.     Query Date: April 3, 2023    By submitting this query, we are merely seeking further clarification of documentation.  Please utilize your independent clinical judgment when addressing the question(s) below.    The Medical Record contains the following   Indicators Supporting Clinical Findings Location in Medical Record   x Heart Failure documented Denies previous history of heart failure  Will diurese and optimize therapy related to ACS as well as heart failure reduced ejection fraction.    She is admitted for STEMI versus NSTEMI with associated new onset heart failure reduced ejection fraction and evidence of volume  overload.    HFrEF (heart failure with reduced ejection fraction)  EF 35% in the setting of CAD, NSTEMI, likely secondary to ischemic cardiomyopathy.  Cardiology consulted. Medical mgmt of NSTEMI as above.  Trending OFELIA, likely CRS. Hepatic function labs improving, likely congestive hepatopathy.  - Coreg 6.25mg BID, goal HR 60  - was on ACE. Held due to OFELIA. Will start entresto when OFELIA resolves. Needs further HTN control  - IV diuresis for now. Volume up on exam. Trending renal fxn. Depending on living situation on discharge will either write prn lasix or maybe 2-3 times weekly  - Outpatient cards f/u on d/c    Final Active Diagnoses:  HFrEF (heart failure with reduced ejection fraction H&P 3/23        HM PN 3/24        HM PN 3/24                            DCS 3/27       x BNP BNP- 1,022 Labs 3/22     x EF/Echo The left ventricle is normal in size with moderately decreased systolic function.  There are segmental left ventricular wall motion abnormalities.  The estimated ejection fraction is 35%.  There is abnormal septal wall motion.  Indeterminate left ventricular diastolic  function.  Normal right ventricular size with normal right ventricular systolic function.  Biatrial enlargement.  Aortic valve area is 1.02 cm2; peak velocity is 1.73 m/s; mean gradient is 6 mmHg. The aortic valve stenosis is probably mild. The continuity equation may not be accurate in the presence of low stroke volume.  There is mild aortic valve stenosis.  Mild aortic regurgitation.  Mild mitral regurgitation.  Moderate tricuspid regurgitation.  The estimated PA systolic pressure is 38 mmHg.  Elevated central venous pressure (15 mmHg)   Echo 3/23     x Radiology findings CXR:  Impression:  No acute radiographic abnormality.  Moderate-large hiatal hernia   CXR 3/22     x Subjective/Objective Respiratory Conditions Respiratory:  Negative for shortness of breath and wheezing    Breath sounds: Rales (bibasilar) present. H&P 3/23    HM PN 3/24     x Recent/Current MI NSTEMI (non-ST elevated myocardial infarction)  Pt presenting with chest pain with elevated troponin to 8.  No EKG changes.  Bedside TTE deferred per pt request.  - recommend medical management   Cards Consult 3/23    Heart Transplant, LVAD     x Edema, JVD Right lower leg: No edema.  Left lower leg: No edema.    JVP to low to mid neck at around 60 degrees    JVP about 6cm above sternal notch. improving   H&P 3/24      HM PN 3/24    HM PN 3/25      Ascites     x Diuretics/Meds Lasix 40 mg IV BID  Lasix 40 mg IV x 1 dose  Lasix 60 mg IV x 1 dose MAR 3/23  MAR 3/25  MAR 3/24      Other Treatment      Other         Heart failure is a clinical diagnosis which includes symptomatic fluid retention, elevated intracardiac pressures, and/or the inability of the heart to deliver adequate blood flow.    Heart Failure with reduced Ejection Fraction (HFrEF) or Systolic Heart Failure (loses ability to contract normally, EF is <40%)    Heart Failure with preserved Ejection Fraction (HFpEF) or Diastolic Heart Failure (stiff ventricles, does not relax properly, EF is  >50%)     Heart Failure with Combined Systolic and Diastolic Failure (stiff ventricles, does not relax properly and EF is <50%)    Mid-range or mildly reduced ejection fraction (HFmrEF) is classified as systolic heart failure.  Congestive heart failure with a recovered EF is classified as Diastolic Heart Failure.  Common clues to acute exacerbation:  Rapidly progressive symptoms (w/in 2 weeks of presentation), using IV diuretics, using supplemental O2, pulmonary edema on Xray, new or worsening pleural effusion, +JVD or other signs of volume overload, MI w/in 4 weeks, and/or BNP >500  The clinical guidelines noted are only system guidelines, and do not replace the providers clinical judgment.    Provider, please specify the heart failure diagnosis associated with the above clinical findings.    [  X ]  Acute Systolic Heart Failure (HFrEF or HFmrEF) - new diagnosis   [   ]  Acute on Chronic Systolic Heart Failure (HFrEF or HFmrEF) - worsening of CHF signs/symptoms in preexisting CHF   [   ]  Other (please specify): ___________________________________       Please document in your progress notes daily for the duration of treatment until resolved and include in your discharge summary.    References:  American Heart Association editorial staff. (2017, May). Ejection Fraction Heart Failure Measurement. American Heart Association. https://www.heart.org/en/health-topics/heart-failure/diagnosing-heart-failure/ejection-fraction-heart-failure-measurement#:~:text=Ejection%20fraction%20(EF)%20is%20a,pushed%20out%20with%20each%20heartbeat  KENNEDY Lieberman (2020, December 15). Heart failure with preserved ejection fraction: Clinical manifestations and diagnosis. [employer]comToDate. https://www.[employer].com/contents/heart-failure-with-preserved-ejection-fraction-clinical-manifestations-and-diagnosis.  ICD-10-CM/PCS Coding Clinic Third Quarter ICD-10, Effective with discharges: September 8, 2020 [facility] § Heart failure  with mid-range or mildly reduced ejection fraction (2020).  ICD-10-CM/PCS Coding Clinic Third Quarter ICD-10, Effective with discharges: September 8, 2020 [facility] § Heart failure with recovered ejection fraction (2020).  Form No. [id number]

## 2023-04-04 ENCOUNTER — LAB VISIT (OUTPATIENT)
Dept: LAB | Facility: HOSPITAL | Age: 88
End: 2023-04-04
Attending: NURSE PRACTITIONER
Payer: MEDICARE

## 2023-04-04 DIAGNOSIS — I25.10 CORONARY ATHEROSCLEROSIS OF NATIVE CORONARY ARTERY: Primary | ICD-10-CM

## 2023-04-04 LAB
ANION GAP SERPL CALC-SCNC: 8 MMOL/L (ref 8–16)
BUN SERPL-MCNC: 30 MG/DL (ref 10–30)
CALCIUM SERPL-MCNC: 8.6 MG/DL (ref 8.7–10.5)
CHLORIDE SERPL-SCNC: 106 MMOL/L (ref 95–110)
CO2 SERPL-SCNC: 18 MMOL/L (ref 23–29)
CREAT SERPL-MCNC: 1.4 MG/DL (ref 0.5–1.4)
EST. GFR  (NO RACE VARIABLE): 33.8 ML/MIN/1.73 M^2
GLUCOSE SERPL-MCNC: 85 MG/DL (ref 70–110)
POTASSIUM SERPL-SCNC: 4.5 MMOL/L (ref 3.5–5.1)
SODIUM SERPL-SCNC: 132 MMOL/L (ref 136–145)

## 2023-04-04 PROCEDURE — 80048 BASIC METABOLIC PNL TOTAL CA: CPT | Mod: HCNC | Performed by: NURSE PRACTITIONER

## 2023-04-05 ENCOUNTER — OFFICE VISIT (OUTPATIENT)
Dept: CARDIOLOGY | Facility: CLINIC | Age: 88
End: 2023-04-05
Payer: MEDICARE

## 2023-04-05 ENCOUNTER — OFFICE VISIT (OUTPATIENT)
Dept: PRIMARY CARE CLINIC | Facility: CLINIC | Age: 88
End: 2023-04-05
Payer: MEDICARE

## 2023-04-05 VITALS — OXYGEN SATURATION: 95 % | DIASTOLIC BLOOD PRESSURE: 74 MMHG | HEART RATE: 66 BPM | SYSTOLIC BLOOD PRESSURE: 126 MMHG

## 2023-04-05 VITALS — DIASTOLIC BLOOD PRESSURE: 74 MMHG | SYSTOLIC BLOOD PRESSURE: 126 MMHG

## 2023-04-05 DIAGNOSIS — M81.0 OSTEOPOROSIS, UNSPECIFIED OSTEOPOROSIS TYPE, UNSPECIFIED PATHOLOGICAL FRACTURE PRESENCE: ICD-10-CM

## 2023-04-05 DIAGNOSIS — F33.0 MILD RECURRENT MAJOR DEPRESSION: ICD-10-CM

## 2023-04-05 DIAGNOSIS — R05.1 ACUTE COUGH: ICD-10-CM

## 2023-04-05 DIAGNOSIS — I10 PRIMARY HYPERTENSION: Chronic | ICD-10-CM

## 2023-04-05 DIAGNOSIS — I25.10 CORONARY ARTERY DISEASE INVOLVING NATIVE CORONARY ARTERY OF NATIVE HEART WITHOUT ANGINA PECTORIS: Primary | Chronic | ICD-10-CM

## 2023-04-05 DIAGNOSIS — I21.4 NSTEMI (NON-ST ELEVATED MYOCARDIAL INFARCTION): Primary | ICD-10-CM

## 2023-04-05 DIAGNOSIS — I50.20 HFREF (HEART FAILURE WITH REDUCED EJECTION FRACTION): ICD-10-CM

## 2023-04-05 DIAGNOSIS — G89.29 CHRONIC MIDLINE THORACIC BACK PAIN: ICD-10-CM

## 2023-04-05 DIAGNOSIS — J43.9 PULMONARY EMPHYSEMA, UNSPECIFIED EMPHYSEMA TYPE: ICD-10-CM

## 2023-04-05 DIAGNOSIS — Z95.0 PACEMAKER: Chronic | ICD-10-CM

## 2023-04-05 DIAGNOSIS — M54.6 CHRONIC MIDLINE THORACIC BACK PAIN: ICD-10-CM

## 2023-04-05 DIAGNOSIS — M79.7 FIBROMYOSITIS: ICD-10-CM

## 2023-04-05 DIAGNOSIS — R06.2 WHEEZE: ICD-10-CM

## 2023-04-05 DIAGNOSIS — F41.9 ANXIETY: ICD-10-CM

## 2023-04-05 DIAGNOSIS — I44.2 ATRIOVENTRICULAR BLOCK, COMPLETE: ICD-10-CM

## 2023-04-05 DIAGNOSIS — I25.10 CORONARY ARTERY DISEASE INVOLVING NATIVE CORONARY ARTERY OF NATIVE HEART WITHOUT ANGINA PECTORIS: Chronic | ICD-10-CM

## 2023-04-05 PROCEDURE — 1159F PR MEDICATION LIST DOCUMENTED IN MEDICAL RECORD: ICD-10-PCS | Mod: CPTII,S$GLB,, | Performed by: INTERNAL MEDICINE

## 2023-04-05 PROCEDURE — 99999 PR PBB SHADOW E&M-EST. PATIENT-LVL III: CPT | Mod: PBBFAC,,, | Performed by: NURSE PRACTITIONER

## 2023-04-05 PROCEDURE — 1160F RVW MEDS BY RX/DR IN RCRD: CPT | Mod: CPTII,S$GLB,, | Performed by: NURSE PRACTITIONER

## 2023-04-05 PROCEDURE — 99214 PR OFFICE/OUTPT VISIT, EST, LEVL IV, 30-39 MIN: ICD-10-PCS | Mod: S$GLB,,, | Performed by: NURSE PRACTITIONER

## 2023-04-05 PROCEDURE — 99499 UNLISTED E&M SERVICE: CPT | Mod: S$GLB,,, | Performed by: NURSE PRACTITIONER

## 2023-04-05 PROCEDURE — 1159F PR MEDICATION LIST DOCUMENTED IN MEDICAL RECORD: ICD-10-PCS | Mod: CPTII,S$GLB,, | Performed by: NURSE PRACTITIONER

## 2023-04-05 PROCEDURE — 99999 PR PBB SHADOW E&M-EST. PATIENT-LVL III: ICD-10-PCS | Mod: PBBFAC,,, | Performed by: NURSE PRACTITIONER

## 2023-04-05 PROCEDURE — 1160F PR REVIEW ALL MEDS BY PRESCRIBER/CLIN PHARMACIST DOCUMENTED: ICD-10-PCS | Mod: CPTII,S$GLB,, | Performed by: NURSE PRACTITIONER

## 2023-04-05 PROCEDURE — 99213 PR OFFICE/OUTPT VISIT, EST, LEVL III, 20-29 MIN: ICD-10-PCS | Mod: S$GLB,,, | Performed by: INTERNAL MEDICINE

## 2023-04-05 PROCEDURE — 99213 OFFICE O/P EST LOW 20 MIN: CPT | Mod: S$GLB,,, | Performed by: INTERNAL MEDICINE

## 2023-04-05 PROCEDURE — 1160F PR REVIEW ALL MEDS BY PRESCRIBER/CLIN PHARMACIST DOCUMENTED: ICD-10-PCS | Mod: CPTII,S$GLB,, | Performed by: INTERNAL MEDICINE

## 2023-04-05 PROCEDURE — 99214 OFFICE O/P EST MOD 30 MIN: CPT | Mod: S$GLB,,, | Performed by: NURSE PRACTITIONER

## 2023-04-05 PROCEDURE — 99999 PR PBB SHADOW E&M-EST. PATIENT-LVL II: CPT | Mod: PBBFAC,,, | Performed by: INTERNAL MEDICINE

## 2023-04-05 PROCEDURE — 1159F MED LIST DOCD IN RCRD: CPT | Mod: CPTII,S$GLB,, | Performed by: NURSE PRACTITIONER

## 2023-04-05 PROCEDURE — 1111F DSCHRG MED/CURRENT MED MERGE: CPT | Mod: CPTII,S$GLB,, | Performed by: NURSE PRACTITIONER

## 2023-04-05 PROCEDURE — 1111F PR DISCHARGE MEDS RECONCILED W/ CURRENT OUTPATIENT MED LIST: ICD-10-PCS | Mod: CPTII,S$GLB,, | Performed by: INTERNAL MEDICINE

## 2023-04-05 PROCEDURE — 1159F MED LIST DOCD IN RCRD: CPT | Mod: CPTII,S$GLB,, | Performed by: INTERNAL MEDICINE

## 2023-04-05 PROCEDURE — 99999 PR PBB SHADOW E&M-EST. PATIENT-LVL II: ICD-10-PCS | Mod: PBBFAC,,, | Performed by: INTERNAL MEDICINE

## 2023-04-05 PROCEDURE — 1111F DSCHRG MED/CURRENT MED MERGE: CPT | Mod: CPTII,S$GLB,, | Performed by: INTERNAL MEDICINE

## 2023-04-05 PROCEDURE — 1111F PR DISCHARGE MEDS RECONCILED W/ CURRENT OUTPATIENT MED LIST: ICD-10-PCS | Mod: CPTII,S$GLB,, | Performed by: NURSE PRACTITIONER

## 2023-04-05 PROCEDURE — 1160F RVW MEDS BY RX/DR IN RCRD: CPT | Mod: CPTII,S$GLB,, | Performed by: INTERNAL MEDICINE

## 2023-04-05 PROCEDURE — 99499 RISK ADDL DX/OHS AUDIT: ICD-10-PCS | Mod: S$GLB,,, | Performed by: NURSE PRACTITIONER

## 2023-04-05 RX ORDER — BENZONATATE 100 MG/1
100 CAPSULE ORAL 3 TIMES DAILY PRN
Qty: 30 CAPSULE | Refills: 0 | Status: SHIPPED | OUTPATIENT
Start: 2023-04-05 | End: 2023-04-15

## 2023-04-05 RX ORDER — ALPRAZOLAM 0.25 MG/1
0.25 TABLET ORAL DAILY PRN
Qty: 30 TABLET | Refills: 0 | Status: SHIPPED | OUTPATIENT
Start: 2023-04-05 | End: 2023-04-19 | Stop reason: CLARIF

## 2023-04-05 NOTE — PROGRESS NOTES
Subjective:   04/05/2023     Patient ID:  Cat Bolivar is a 89 y.o. female who presents for evaulation of No chief complaint on file.      Patient currently without chest pains or tightness, PND orthopnea.  Hospital records reviewed, chart reviewed.      Recent hospital stay:  Patient Name: Cat Bolivar  MRN: [id number]  STEPHANIE: [id number]  Patient Class: IP- Inpatient  Admission Date: 3/22/2023  Hospital Length of Stay: 5 days  Discharge Date and Time:  03/27/2023 2:05 PM  Attending Physician: Boone Waller MD   Discharging Provider: Landon Mooney MD  Primary Care Provider: [facility] Medicine Team: [facility] Landon Mooney MD  Primary Care Team: [facility] 1     HPI:   89 y.o. female with PMHx of fibromyalgia, HTN, CAD s/p stent placement around 2005, HLD, prediabetes, glaucoma, anxiety, hypothyroidism, osteoporosis presenting with acute onset of chest pain that has been going on since last night.  She states she has had pain like this for years and thinks it is secondary to her fibromyalgia. The pain is reproducible when she pushes on her chest.  She denies any shortness of breath, nausea, vomiting, diaphoresis. Pain is located substernally, nonexertional, does not take nitroglycerin at home. Heating pads help. Currently pain free at time of evaluation. She did not wish to come to the emergency department but her family got scared when she was complaining of chest pain and called EMS. Denies tobacco use, but has had some exposure to secondhand smoke from her . Denies orthopnea, PND, leg swelling abdominal swelling, abnormal UOP, excessive salt intake beyond her usual. Reports drinks very little at baseline. Reports medication compliance but stated meds are very different from her personal list and meds listed in chart. Does not take lasix or aldactone (listed on her med list paper in her folder, aldactone 12.5mg and lasix 20mg prn for SBP>150). Denies previous history of  "heart failure. Home blood pressures are normally in 110-130s systolic. Has been out of her xanax for a month, takes it for sleep and anxiety (takes no SSRIs). At baseline she ambulates well, barely needing her walker. Has remote hx of fall, but her balance has been good lately. She reports following with Dr. Veliz. Reportedly takes plavix 3x/week per his instructions, but expresses confusion regarding this. Unclear why she has statin intolerance listed per chart review. She and her daughter were unable to provide specific information about what statin induced reaction she might've had. Reports taking Zetia sometimes. Also unclear what allergy she had to aspirin (listed as "headache" in chart), but patient denies being on aspirin or any specific known reaction to it.      In ED, labs were significant for troponin of 8.141, Cr 1.8, BUN 40, Na 134, BNP 1022, , . BP was elevated to 182/66 in ED, on room air, afebrile, HR normal. She wishes to be DNR and does not want invasive intervention such as cardiac cath. Admitted to [facility] for ACS protocol. Has been starting on ticagrelor and heparin drip in ED.      Hospital Course:   Patient admitted to hospital medicine for NSTEMI and treated with medical management based on goals of care conversations. She was also treated for volume overload with IV lasix. She has continued to be chest pain free.  Pt's creatinine increased to above baseline during diuresis. Obtained urine/serum osm, urine sodium, which lead us to believe this is more of a prerenal azotemia and lasix were held with stabilization of renal function. Patient with low-normal BP and thought unlikely to tolerate full GDMT for her HFrEF, so discharged on low dose coreg, holding lisinopril for OFELIA - with close cardiology follow up to assess for initiation of GDMT. Also with follow up labs and PCP visit to assess for resolution of OFELIA.          Prior note:    Patient comes in for follow-up.  Her " blood pressure is elevated today.  She had been on amlodipine in the past, now not taking.      She was admitted several months ago with GI bleed, felt to be due to nonsteroidal anti-inflammatory agents and gastric ulcers.  She has not had recurrent problems.  She remains on clopidogrel 75 mg daily.    She does have a history of coronary artery disease status post stents, she has not had chest pains or tightness, PND or orthopnea.    She does have a history of hypercholesterolemia.  She has been intolerant to statins.  She is on Zetia.  A lipid profile in February showed a total cholesterol of 195, triglycerides 99, HDL 83 and LDL 96.     She is status post permanent pacemaker many years ago.  Pacemaker function has been normal on checks.  Due for replacement about 1 year        Hypertension  Associated symptoms include malaise/fatigue and neck pain. Pertinent negatives include no chest pain, headaches, orthopnea, palpitations, PND or shortness of breath.   Coronary Artery Disease  Pertinent negatives include no chest pain, leg swelling, palpitations, shortness of breath or weight gain.     Patient Active Problem List   Diagnosis    Osteoporosis, post-menopausal    Unspecified vitamin D deficiency    POAG (primary open-angle glaucoma) - Both Eyes    History of retinal detachment - Right Eye    Ptosis - Both Eyes    Dry eyes - Both Eyes    Pseudophakia - Both Eyes    Steroid responders borderline glaucoma - Both Eyes    Myopia with astigmatism and presbyopia - Both Eyes    LBP (low back pain)    Marginal ulcer - Left Eye    Blepharoconjunctivitis - Both Eyes    Back pain    Cervical spondylosis without myelopathy    Hypertensive heart disease without heart failure    Hypothyroidism    Pacemaker    Anxiety    Chronic neck pain    Rotator cuff tendonitis    Presence of stent in coronary artery    Fibromyositis    Glaucoma    Hiatal hernia    Hyperparathyroidism    Irritable bowel syndrome    Osteoporosis    Prolapsing  mitral leaflet syndrome    Pure hypercholesterolemia    Shoulder joint pain    Statin intolerance    Temporomandibular joint pain-dysfunction syndrome    Thyroid nodule    Carotid artery disease    Mild recurrent major depression    Rectal bleeding    Spinal compression fracture    Coronary artery disease involving native coronary artery of native heart without angina pectoris    Primary hypertension    Urinary retention    Normal anion gap metabolic acidosis    OFELIA (acute kidney injury)    Transaminitis    HFrEF (heart failure with reduced ejection fraction)        Past Medical History:   Diagnosis Date    Carotid artery disease     Cervical spondylosis without myelopathy 4/28/2014    Fibromyalgia     Fracture of wrist     Fracture, ankle     both at diff times    Glaucoma     Hyperparathyroidism     Hypothyroidism     Osteoporosis, post-menopausal     Unspecified vitamin D deficiency 10/30/2012       Past Surgical History:   Procedure Laterality Date    APPENDECTOMY      Bilateral Levator Repair  7/03    [facility] (Sangeeta Still)    CARDIAC PACEMAKER PLACEMENT      carotid endarterectomy      CATARACT EXTRACTION W/  INTRAOCULAR LENS IMPLANT Bilateral 1990's        CATARACT EXTRACTION W/ INTRAOCULAR LENS IMPLANTW/ TRABECULECTOMY Right 03/1994    [facility] ( Dr. Ott)    CORONARY ANGIOPLASTY WITH STENT PLACEMENT      ESOPHAGOGASTRODUODENOSCOPY N/A 5/31/2021    Procedure: EGD (ESOPHAGOGASTRODUODENOSCOPY);  Surgeon: Brooks Sanchez MD;  Location: [facility] ([address] [place] [address]);  Service: Endoscopy;  Laterality: N/A;    HYSTERECTOMY      Ptosis Revision Left 10/2003    [facility] (Dr. Ott)    RETINAL DETACHMENT SURGERY  2/02    [facility] (Dr. Mckeon)    thyroid nodule      WRIST SURGERY         Review of patient's allergies indicates:   Allergen Reactions    Citalopram      tachycardia    Codeine Other (See Comments)     Dizziness    Cytotec [misoprostol] Other (See Comments)     Unknown      Feldene [piroxicam] Other (See Comments)     unknown     Gentamicin Other (See Comments)     Flushing      Indocin [indomethacin sodium] Other (See Comments)     Unknown      Motrin [ibuprofen] Nausea And Vomiting    Paxil [paroxetine hcl] Other (See Comments)     Suicidal feelings    Prozac [fluoxetine] Other (See Comments)     Suicidal feeling    Tofranil [imipramine hcl] Other (See Comments)     Feeling restless    Vancomycin analogues Other (See Comments)     Unknown      Elavil [amitriptyline] Palpitations    Penicillins Rash     Other reaction(s): RASH         Current Outpatient Medications:     ALPRAZolam (XANAX) 0.25 MG tablet, Take 1 tablet (0.25 mg total) by mouth daily as needed for Anxiety or Insomnia., Disp: , Rfl:     aspirin 81 MG Chew, Take 1 tablet (81 mg total) by mouth once daily., Disp: 90 tablet, Rfl: 3    benzonatate (TESSALON PERLES) 100 MG capsule, Take 1 capsule (100 mg total) by mouth 3 (three) times daily as needed for Cough., Disp: 30 capsule, Rfl: 0    BIOTIN ORAL, Take 1,000 mg by mouth once daily., Disp: , Rfl:     carvediloL (COREG) 3.125 MG tablet, Take 1 tablet (3.125 mg total) by mouth 2 (two) times daily., Disp: 180 tablet, Rfl: 3    clopidogreL (PLAVIX) 75 mg tablet, Take 1 tablet (75 mg total) by mouth once daily., Disp: 90 tablet, Rfl: 3    coenzyme Q10 10 mg capsule, Take 10 mg by mouth once daily., Disp: , Rfl:     diclofenac sodium (VOLTAREN) 1 % Gel, Apply 2 g topically daily as needed (Pain)., Disp: , Rfl:     dorzolamide (TRUSOPT) 2 % ophthalmic solution, Place 1 drop into both eyes 3 (three) times daily. (Patient taking differently: Place 1 drop into both eyes 2 (two) times a day.), Disp: 30 mL, Rfl: 3    ergocalciferol, vitamin D2, (VITAMIN D ORAL), Take 50 mcg by mouth once daily., Disp: , Rfl:     ezetimibe (ZETIA) 10 mg tablet, TAKE 1 TABLET (10 MG TOTAL) BY MOUTH EVERY EVENING., Disp: 90 tablet, Rfl: 3    fexofenadine (ALLEGRA) 60 MG tablet, Take 60 mg by mouth daily as needed (Allergies)., Disp: , Rfl:     furosemide  (LASIX) 20 MG tablet, Take 1 tablet (20 mg total) by mouth daily as needed (Weight gain > 5 pounds in 24 hours, leg swelling, shortness of breath). (Patient not taking: Reported on 3/28/2023), Disp: 30 tablet, Rfl: 11    gabapentin (NEURONTIN) 100 MG capsule, Take 1 capsule (100 mg total) by mouth 2 (two) times daily as needed (Pain)., Disp: , Rfl:     latanoprost 0.005 % ophthalmic solution, INSTILL 1 DROP INTO BOTH EYES EVERY EVENING. , Disp: 3 Bottle, Rfl: 3    levothyroxine (SYNTHROID) 50 MCG tablet, TAKE ONE TABLET BY MOUTH EVERY DAY BEFORE BREAKFAST, Disp: 90 tablet, Rfl: 3    LIDOcaine (LIDODERM) 5 %, Place 1 patch onto the skin once daily. Remove & Discard patch within 12 hours or as directed by MD (Patient taking differently: Place 1 patch onto the skin daily as needed (Pain). Remove & Discard patch within 12 hours or as directed by MD), Disp: 30 patch, Rfl: 0    lisinopriL (PRINIVIL,ZESTRIL) 20 MG tablet, Take 1 tablet (20 mg total) by mouth once daily. DO NOT TAKE UNTIL FOLLOW UP WITH YOUR PCP (Patient not taking: Reported on 3/28/2023), Disp: 90 tablet, Rfl: 3    pantoprazole (PROTONIX) 40 MG tablet, Take 1 tablet (40 mg total) by mouth once daily., Disp: 90 tablet, Rfl: 2              Objective:   Review of Systems   Constitutional: Positive for malaise/fatigue and weight loss. Negative for chills, decreased appetite, diaphoresis, fever and weight gain.   HENT:  Positive for hearing loss. Negative for congestion, nosebleeds and sore throat.    Eyes:  Negative for double vision, vision loss in left eye and vision loss in right eye.   Cardiovascular:  Negative for chest pain, claudication, cyanosis, dyspnea on exertion, irregular heartbeat, leg swelling, near-syncope, orthopnea, palpitations, paroxysmal nocturnal dyspnea and syncope.   Respiratory:  Negative for cough, hemoptysis, shortness of breath, sleep disturbances due to breathing, snoring and wheezing.    Endocrine: Negative for cold intolerance  and heat intolerance.   Hematologic/Lymphatic: Negative for adenopathy and bleeding problem. Does not bruise/bleed easily.   Skin:  Negative for itching, nail changes and rash.   Musculoskeletal:  Positive for arthritis, back pain, joint pain and neck pain. Negative for falls and myalgias.   Gastrointestinal:  Negative for bloating, abdominal pain, anorexia, constipation, diarrhea, hematochezia, melena, nausea and vomiting.   Genitourinary:  Negative for frequency, hematuria and nocturia.   Neurological:  Negative for focal weakness, headaches, vertigo and weakness.   Psychiatric/Behavioral:  Positive for depression. Negative for altered mental status and memory loss.    Allergic/Immunologic: Negative for hives.     Vitals:    04/05/23 1432   BP: 126/74         Physical Exam  Vitals reviewed.   Constitutional:       General: She is not in acute distress.     Appearance: She is well-developed.   HENT:      Head: Normocephalic and atraumatic.      Nose: Nose normal.   Eyes:      Conjunctiva/sclera: Conjunctivae normal.      Pupils: Pupils are equal, round, and reactive to light.   Neck:      Vascular: No JVD.   Cardiovascular:      Rate and Rhythm: Normal rate and regular rhythm.      Pulses: Intact distal pulses. Decreased pulses.      Heart sounds: Murmur (Grade 1 systolic ejection murmur) heard.   Medium-pitched midsystolic murmur is present with a grade of 2/6.     No friction rub. No gallop.      Comments: Trace edema present  Jugular venous pressure normal  Pulmonary:      Effort: Pulmonary effort is normal. No respiratory distress.      Breath sounds: Normal breath sounds. No wheezing or rales.   Chest:      Chest wall: No tenderness.   Abdominal:      General: Bowel sounds are normal. There is no distension.      Palpations: Abdomen is soft.      Tenderness: There is no abdominal tenderness.   Musculoskeletal:         General: No tenderness or deformity. Normal range of motion.      Cervical back: Normal range  of motion and neck supple.      Right lower leg: Edema (1+) present.      Left lower leg: Edema ( 1+) present.   Skin:     General: Skin is warm and dry.      Findings: No erythema or rash.   Neurological:      Mental Status: She is alert and oriented to person, place, and time.      Cranial Nerves: No cranial nerve deficit.      Motor: No abnormal muscle tone.      Coordination: Coordination normal.   Psychiatric:         Behavior: Behavior normal.         Thought Content: Thought content normal.         Judgment: Judgment normal.       Lab Visit on 04/04/2023   Component Date Value    Sodium 04/04/2023 132 (L)     Potassium 04/04/2023 4.5     Chloride 04/04/2023 106     CO2 04/04/2023 18 (L)     Glucose 04/04/2023 85     BUN 04/04/2023 30     Creatinine 04/04/2023 1.4     Calcium 04/04/2023 8.6 (L)     Anion Gap 04/04/2023 8     eGFR 04/04/2023 33.8 (A)    No results displayed because visit has over 200 results.      Lab Visit on 04/12/2022   Component Date Value    Sodium 04/12/2022 133 (L)     Potassium 04/12/2022 4.6     Chloride 04/12/2022 100     CO2 04/12/2022 23     Glucose 04/12/2022 91     BUN 04/12/2022 23     Creatinine 04/12/2022 1.2     Calcium 04/12/2022 9.7     Anion Gap 04/12/2022 10     eGFR if African American 04/12/2022 43.5 (A)     eGFR if non African Amer* 04/12/2022 37.7 (A)         Assessment and Plan:     Coronary artery disease involving native coronary artery of native heart without angina pectoris    HFrEF (heart failure with reduced ejection fraction)  -     Ambulatory referral/consult to Cardiology    Pacemaker      Patient very elderly and debilitated.  She does not wish further invasive procedures.  She wishes to be a no code.  I believe she is an appropriate candidate for hospice    No follow-ups on file.

## 2023-04-06 ENCOUNTER — PATIENT MESSAGE (OUTPATIENT)
Dept: PRIMARY CARE CLINIC | Facility: CLINIC | Age: 88
End: 2023-04-06
Payer: MEDICARE

## 2023-04-06 DIAGNOSIS — I50.20 HFREF (HEART FAILURE WITH REDUCED EJECTION FRACTION): Primary | ICD-10-CM

## 2023-04-06 RX ORDER — CLOPIDOGREL BISULFATE 75 MG/1
75 TABLET ORAL DAILY
Qty: 90 TABLET | Refills: 3 | Status: CANCELLED | OUTPATIENT
Start: 2023-04-06

## 2023-04-06 RX ORDER — FUROSEMIDE 20 MG/1
20 TABLET ORAL DAILY PRN
Qty: 30 TABLET | Refills: 11 | Status: CANCELLED | OUTPATIENT
Start: 2023-04-06 | End: 2024-04-05

## 2023-04-06 RX ORDER — CARVEDILOL 3.12 MG/1
3.12 TABLET ORAL 2 TIMES DAILY
Qty: 180 TABLET | Refills: 3 | Status: CANCELLED | OUTPATIENT
Start: 2023-04-06 | End: 2024-04-05

## 2023-04-11 ENCOUNTER — PATIENT MESSAGE (OUTPATIENT)
Dept: PRIMARY CARE CLINIC | Facility: CLINIC | Age: 88
End: 2023-04-11
Payer: MEDICARE

## 2023-04-11 ENCOUNTER — TELEPHONE (OUTPATIENT)
Dept: PRIMARY CARE CLINIC | Facility: CLINIC | Age: 88
End: 2023-04-11
Payer: MEDICARE

## 2023-04-11 NOTE — TELEPHONE ENCOUNTER
----- Message from Jesika Vidal sent at 4/11/2023  2:32 PM CDT -----  Contact: [phone] Natalie with [facility]  Natalie is requesting medical records, H & P, med list and face sheet on the pt. Natalie states her fax at [facility] is .

## 2023-04-12 PROBLEM — I44.2 ATRIOVENTRICULAR BLOCK, COMPLETE: Status: ACTIVE | Noted: 2023-04-12

## 2023-04-12 PROBLEM — J43.9 PULMONARY EMPHYSEMA, UNSPECIFIED EMPHYSEMA TYPE: Status: ACTIVE | Noted: 2023-04-12

## 2023-04-12 NOTE — PROGRESS NOTES
Transitional Care Note  Subjective:       Patient ID: Cat Bolivar is a 89 y.o. female.  Chief Complaint: Follow-up (Hospital follow up)    Family and/or Caretaker present at visit?  Yes.  Diagnostic tests reviewed/disposition: No diagnosic tests pending after this hospitalization.  Disease/illness education: hospice  Home health/community services discussion/referrals: Patient has home health established at [facility] .   Establishment or re-establishment of referral orders for community resources: No other necessary community resources.all already ordered  Discussion with other health care providers:  Dr. Veliz- pt's cardiologist and PCP Dr Ellis .       Follow-up  Associated symptoms include arthralgias, coughing, fatigue and weakness. Pertinent negatives include no chest pain, chills, congestion, fever, headaches, myalgias or numbness.     This is a 89-year-old female patient of Dr. Ellis's with chronic conditions of cervical spondylosis with spinal compression fractures, [place], major depression, anxiety, glaucoma-p.o. AG, ptosis bilaterally, TMJ, CAD, heart failure, pacemaker, stent placement, hypertension, hyper cholesterol, hypothyroid, vitamin-D deficiency, hiatal hernia, IBS, osteoporosis who is new to me and presents as a hospital follow-up from 03/27/2023 for diagnosis of non-STEMI.  Patient's hospital stay was from 03/22/2023-3/27/23.   Pt not completely coherent, pt agitated, pt doesn't want to work with PT or OT, she is too fatigued and weak, pt refuses to go into a assisted living or NH, does have sitters that come over from 4-8p daily, unknown if pt is taking her morning medications since she is by herself at that time.   Dr. Veliz and Dr. Ellis in room to discuss current health and outcome with pt and niece. Pt agrees to start hospice to be able to stay in her home and to get more help with care and keep her out of the hospital.   Current V/S 95% RA, 66, 126/74    D/C note:   presenting  "with acute onset of chest pain that has been going on since last night.  She states she has had pain like this for years and thinks it is secondary to her fibromyalgia. The pain is reproducible when she pushes on her chest.  She denies any shortness of breath, nausea, vomiting, diaphoresis. Pain is located substernally, nonexertional, does not take nitroglycerin at home. Heating pads help. Currently pain free at time of evaluation. She did not wish to come to the emergency department but her family got scared when she was complaining of chest pain and called EMS. Denies tobacco use, but has had some exposure to secondhand smoke from her . Denies orthopnea, PND, leg swelling abdominal swelling, abnormal UOP, excessive salt intake beyond her usual. Reports drinks very little at baseline. Reports medication compliance but stated meds are very different from her personal list and meds listed in chart. Does not take lasix or aldactone (listed on her med list paper in her folder, aldactone 12.5mg and lasix 20mg prn for SBP>150). Denies previous history of heart failure. Home blood pressures are normally in 110-130s systolic. Has been out of her xanax for a month, takes it for sleep and anxiety (takes no SSRIs). At baseline she ambulates well, barely needing her walker. Has remote hx of fall, but her balance has been good lately. She reports following with Dr. Veliz. Reportedly takes plavix 3x/week per his instructions, but expresses confusion regarding this. Unclear why she has statin intolerance listed per chart review. She and her daughter were unable to provide specific information about what statin induced reaction she might've had. Reports taking Zetia sometimes. Also unclear what allergy she had to aspirin (listed as "headache" in chart), but patient denies being on aspirin or any specific known reaction to it.      In ED, labs were significant for troponin of 8.141, Cr 1.8, BUN 40, Na 134, BNP 1022, AST " 115, . BP was elevated to 182/66 in ED, on room air, afebrile, HR normal. She wishes to be DNR and does not want invasive intervention such as cardiac cath. Admitted to [facility] for ACS protocol. Has been starting on ticagrelor and heparin drip in ED.     Hospital Course:   Patient admitted to [facility] medicine for NSTEMI and treated with medical management based on goals of care conversations. She was also treated for volume overload with IV lasix. She has continued to be chest pain free.  Pt's creatinine increased to above baseline during diuresis. Obtained urine/serum osm, urine sodium, which lead us to believe this is more of a prerenal azotemia and lasix were held with stabilization of renal function. Patient with low-normal BP and thought unlikely to tolerate full GDMT for her HFrEF, so discharged on low dose coreg, holding lisinopril for OFELIA - with close cardiology follow up to assess for initiation of GDMT. Also with follow up labs and PCP visit to assess for resolution of OFELIA.     Significant Diagnostic Studies:   TTE:  The left ventricle is normal in size with moderately decreased systolic function.  There are segmental left ventricular wall motion abnormalities.  The estimated ejection fraction is 35%.  There is abnormal septal wall motion.  Indeterminate left ventricular diastolic function.  Normal right ventricular size with normal right ventricular systolic function.  Biatrial enlargement.  Aortic valve area is 1.02 cm2; peak velocity is 1.73 m/s; mean gradient is 6 mmHg. The aortic valve stenosis is probably mild. The continuity equation may not be accurate in the presence of low stroke volume.  There is mild aortic valve stenosis.  Mild aortic regurgitation.  Mild mitral regurgitation.  Moderate tricuspid regurgitation.  The estimated PA systolic pressure is 38 mmHg.  Elevated central venous pressure (15 mmHg).    Review of Systems   Constitutional:  Positive for activity change, appetite  change and fatigue. Negative for chills and fever.   HENT:  Negative for congestion.    Respiratory:  Positive for cough. Negative for chest tightness, shortness of breath and wheezing.    Cardiovascular:  Negative for chest pain.   Gastrointestinal:  Negative for abdominal distention.   Genitourinary:  Negative for difficulty urinating.   Musculoskeletal:  Positive for arthralgias, back pain and gait problem. Negative for myalgias.   Neurological:  Positive for weakness. Negative for dizziness, light-headedness, numbness and headaches.   Psychiatric/Behavioral:  Positive for agitation, decreased concentration and dysphoric mood. Negative for confusion. The patient is not nervous/anxious.          Objective:      Physical Exam  Vitals and nursing note reviewed.   Constitutional:       General: She is not in acute distress.     Appearance: She is ill-appearing. She is not toxic-appearing or diaphoretic.      Comments: Pt alert once stimulated   HENT:      Head: Normocephalic and atraumatic.   Cardiovascular:      Rate and Rhythm: Normal rate and regular rhythm.      Heart sounds: Normal heart sounds.   Pulmonary:      Effort: Pulmonary effort is normal. No respiratory distress.      Breath sounds: Normal breath sounds. No wheezing.   Musculoskeletal:         General: Tenderness present.   Skin:     General: Skin is warm and dry.   Neurological:      General: No focal deficit present.      Mental Status: She is oriented to person, place, and time.   Psychiatric:         Mood and Affect: Mood normal.         Behavior: Behavior normal.         Thought Content: Thought content normal.         Judgment: Judgment normal.           Assessment:       1. NSTEMI (non-ST elevated myocardial infarction)    2. HFrEF (heart failure with reduced ejection fraction)    3. Coronary artery disease involving native coronary artery of native heart without angina pectoris    4. Pacemaker    5. Primary hypertension    6. Atrioventricular  block, complete    7. Acute cough    8. Wheeze    9. Pulmonary emphysema, unspecified emphysema type    10. Anxiety    11. Chronic midline thoracic back pain    12. Fibromyositis    13. Osteoporosis, unspecified osteoporosis type, unspecified pathological fracture presence    14. Mild recurrent major depression        Plan:       Referral to outpatient case management  Referral to [facility] at home  Referral to cardiology-Dr. Veliz    START taking these medications       aspirin 81 MG Chew  Take 1 tablet (81 mg total) by mouth once daily.      carvediloL 3.125 MG tablet  Commonly known as: COREG  Take 1 tablet (3.125 mg total) by mouth 2 (two) times daily.      furosemide 20 MG tablet  Commonly known as: LASIX  Take 1 tablet (20 mg total) by mouth daily as needed (Weight gain > 5 pounds in 24 hours, leg swelling, shortness of breath).       Pt declined the cxr  Cough pills filled  Xanax refilled with ok from Dr. Ellis  F/u as needed

## 2023-04-19 DIAGNOSIS — F41.9 ANXIETY: ICD-10-CM

## 2023-04-19 RX ORDER — ALPRAZOLAM 0.25 MG/1
0.25 TABLET ORAL DAILY PRN
Qty: 30 TABLET | Refills: 0 | OUTPATIENT
Start: 2023-04-19

## 2023-04-19 RX ORDER — EZETIMIBE 10 MG/1
10 TABLET ORAL NIGHTLY
Qty: 90 TABLET | Refills: 3 | OUTPATIENT
Start: 2023-04-19

## 2023-05-01 ENCOUNTER — PATIENT MESSAGE (OUTPATIENT)
Dept: PRIMARY CARE CLINIC | Facility: CLINIC | Age: 88
End: 2023-05-01
Payer: MEDICARE

## 2023-05-01 ENCOUNTER — PATIENT MESSAGE (OUTPATIENT)
Dept: CARDIOLOGY | Facility: CLINIC | Age: 88
End: 2023-05-01
Payer: MEDICARE

## 2023-05-02 ENCOUNTER — EXTERNAL HOME HEALTH (OUTPATIENT)
Dept: HOME HEALTH SERVICES | Facility: HOSPITAL | Age: 88
End: 2023-05-02
Payer: MEDICARE

## 2023-05-03 ENCOUNTER — DOCUMENT SCAN (OUTPATIENT)
Dept: HOME HEALTH SERVICES | Facility: HOSPITAL | Age: 88
End: 2023-05-03
Payer: MEDICARE

## 2023-05-15 ENCOUNTER — OUTPATIENT CASE MANAGEMENT (OUTPATIENT)
Dept: ADMINISTRATIVE | Facility: OTHER | Age: 88
End: 2023-05-15
Payer: MEDICARE